# Patient Record
Sex: FEMALE | Race: WHITE | NOT HISPANIC OR LATINO | Employment: OTHER | URBAN - METROPOLITAN AREA
[De-identification: names, ages, dates, MRNs, and addresses within clinical notes are randomized per-mention and may not be internally consistent; named-entity substitution may affect disease eponyms.]

---

## 2017-02-08 ENCOUNTER — DOCUMENTATION (OUTPATIENT)
Dept: CARDIOLOGY | Facility: CLINIC | Age: 67
End: 2017-02-08

## 2017-02-08 NOTE — PROGRESS NOTES
Patient called and left a message stating she was returning a call about medication.  I attempted to call patient back to get more information and had to leave a message at 1:44pm

## 2017-02-09 DIAGNOSIS — E78.5 DYSLIPIDEMIA: Primary | ICD-10-CM

## 2017-02-09 RX ORDER — ROSUVASTATIN CALCIUM 40 MG/1
40 TABLET, COATED ORAL DAILY
Qty: 90 TABLET | Refills: 0 | Status: SHIPPED | OUTPATIENT
Start: 2017-02-09 | End: 2017-05-22 | Stop reason: SDUPTHER

## 2017-02-15 ENCOUNTER — OFFICE VISIT (OUTPATIENT)
Dept: CARDIOLOGY | Facility: CLINIC | Age: 67
End: 2017-02-15

## 2017-02-15 VITALS
SYSTOLIC BLOOD PRESSURE: 124 MMHG | WEIGHT: 154.2 LBS | HEIGHT: 68 IN | BODY MASS INDEX: 23.37 KG/M2 | HEART RATE: 64 BPM | DIASTOLIC BLOOD PRESSURE: 68 MMHG

## 2017-02-15 DIAGNOSIS — E78.5 DYSLIPIDEMIA: ICD-10-CM

## 2017-02-15 DIAGNOSIS — Z48.812 POSTOP CAROTID ENDARTERECTOMY SURVEILLANCE, ENCOUNTER FOR: ICD-10-CM

## 2017-02-15 DIAGNOSIS — I25.718 CORONARY ARTERY DISEASE INVOLVING AUTOLOGOUS VEIN CORONARY BYPASS GRAFT WITH OTHER FORMS OF ANGINA PECTORIS (HCC): Primary | ICD-10-CM

## 2017-02-15 DIAGNOSIS — I77.9 RIGHT-SIDED CAROTID ARTERY DISEASE (HCC): ICD-10-CM

## 2017-02-15 DIAGNOSIS — Z72.0 TOBACCO ABUSE: ICD-10-CM

## 2017-02-15 DIAGNOSIS — I70.1 RENAL ARTERY STENOSIS (HCC): ICD-10-CM

## 2017-02-15 DIAGNOSIS — I10 ESSENTIAL HYPERTENSION: ICD-10-CM

## 2017-02-15 PROCEDURE — 99213 OFFICE O/P EST LOW 20 MIN: CPT | Performed by: NURSE PRACTITIONER

## 2017-02-15 RX ORDER — LEVOTHYROXINE SODIUM 0.1 MG/1
100 TABLET ORAL DAILY
COMMUNITY

## 2017-02-15 RX ORDER — BISOPROLOL FUMARATE 5 MG/1
5 TABLET, FILM COATED ORAL DAILY
COMMUNITY

## 2017-02-15 RX ORDER — DIAZEPAM 5 MG/1
5 TABLET ORAL DAILY PRN
COMMUNITY

## 2017-02-15 RX ORDER — CITALOPRAM 40 MG/1
40 TABLET ORAL DAILY
COMMUNITY
End: 2017-08-28 | Stop reason: ALTCHOICE

## 2017-02-15 RX ORDER — ASPIRIN 81 MG/1
81 TABLET ORAL DAILY
COMMUNITY
End: 2017-12-05

## 2017-02-15 RX ORDER — NIFEDIPINE 30 MG/1
30 TABLET, FILM COATED, EXTENDED RELEASE ORAL DAILY
COMMUNITY

## 2017-02-15 RX ORDER — OMEPRAZOLE 40 MG/1
40 CAPSULE, DELAYED RELEASE ORAL DAILY
COMMUNITY

## 2017-02-15 RX ORDER — TRAZODONE HYDROCHLORIDE 150 MG/1
150 TABLET ORAL NIGHTLY
COMMUNITY

## 2017-02-15 NOTE — PROGRESS NOTES
Subjective:     Encounter Date:02/15/2017      Patient ID: Dalila Velez is a 66 y.o. female.    Chief Complaint:Coronary Artery Disease; Hypertension; and Follow-up    PROBLEM LIST:  1. Coronary artery disease:  a. History of catheter-based interventions; incomplete database.   b. Normal Regadenoson myocardial perfusion study, January 2015.  2. Renal artery stenosis:  a. Status post bilateral renal artery stenting by Dr. Landis in 2012. Right renal artery stented with a 4 mm stent. Left renal was treated with a 5.0 x 15 mm bare-metal stent.   3. Carotid disease:  a. Left carotid endarterectomy by Dr. Ramirez, 2006.   4. Hypertension.  5. Dyslipidemia.  6. Ongoing tobacco abuse.  7. Peptic ulcer disease.  8. GERD.  9. Hypothyroidism with nodule.  10. Cholecystectomy.   11. Anxiety/depression.      Allergies   Allergen Reactions   • Codeine Nausea And Vomiting         Current Outpatient Prescriptions:   •  aspirin 81 MG EC tablet, Take 81 mg by mouth Daily., Disp: , Rfl:   •  bisoprolol (ZEBeta) 5 MG tablet, Take 5 mg by mouth Daily. Takes 1/2 tablet at bed time, Disp: , Rfl:   •  citalopram (CeleXA) 40 MG tablet, Take 40 mg by mouth Daily., Disp: , Rfl:   •  diazePAM (VALIUM) 5 MG tablet, Take 5 mg by mouth Daily As Needed for anxiety., Disp: , Rfl:   •  levothyroxine (SYNTHROID, LEVOTHROID) 100 MCG tablet, Take 100 mcg by mouth Daily., Disp: , Rfl:   •  NIFEdipine CC (ADALAT CC) 30 MG 24 hr tablet, Take 30 mg by mouth Daily., Disp: , Rfl:   •  omeprazole (priLOSEC) 40 MG capsule, Take 40 mg by mouth Daily., Disp: , Rfl:   •  traZODone (DESYREL) 150 MG tablet, Take 150 mg by mouth Every Night., Disp: , Rfl:   •  rosuvastatin (CRESTOR) 40 MG tablet, Take 1 tablet by mouth Daily., Disp: 90 tablet, Rfl: 0        History of Present Illness    Patient returns today for annual follow-up of her coronary and peripheral arterial disease.  Since last being seen she is overall done fairly well.  She has been under a  "significant amount of stress over the last year due to acute illness of her .  He is today going to be transferred to a nursing home here in [place].  He has a tracheostomy and PEG tube.  Patient does note over the last few months some mild increasing shortness of breath with activity.  Also notes that she feels \"exhausted\" and fatigues a little bit easier with physical activity.  These are not similar to her previous anginal symptoms which include dizziness, presyncope, and \"getting sick\".  She denies any chest pain, pressure, tightness.  Denies any syncope, near-syncope, edema.  No claudication symptoms noted.  Does note some occasional palpitations.  These are noted primarily at night whenever she lays down to go to bed.  They're very brief in nature.    The following portions of the patient's history were reviewed and updated as appropriate: allergies, current medications, past family history, past medical history, past social history, past surgical history and problem list.        Social History   Substance Use Topics   • Smoking status: Current Every Day Smoker     Types: Cigarettes   • Smokeless tobacco: None   • Alcohol use No         Review of Systems   Constitution: Positive for malaise/fatigue. Negative for fever and weakness.   HENT: Negative for headaches and nosebleeds.    Eyes: Negative for redness and visual disturbance.   Cardiovascular: Negative for orthopnea, palpitations and paroxysmal nocturnal dyspnea.   Respiratory: Positive for shortness of breath. Negative for cough, snoring, sputum production and wheezing.    Hematologic/Lymphatic: Negative for bleeding problem.   Skin: Negative for flushing, itching and rash.   Musculoskeletal: Negative for falls, joint pain and muscle cramps.   Gastrointestinal: Negative for abdominal pain, diarrhea, heartburn, nausea and vomiting.   Genitourinary: Negative for hematuria.   Neurological: Negative for excessive daytime sleepiness, dizziness and " "tremors.   Psychiatric/Behavioral: Negative for substance abuse. The patient is not nervous/anxious.           Objective:    height is 68\" (172.7 cm) and weight is 154 lb 3.2 oz (69.9 kg). Her blood pressure is 124/68 and her pulse is 64.         Physical Exam   Constitutional: She is oriented to person, place, and time. She appears well-developed and well-nourished.   Neck: No JVD present. Carotid bruit is not present.   Cardiovascular: Normal rate, regular rhythm, S1 normal, S2 normal and normal heart sounds.    Pulmonary/Chest: Effort normal and breath sounds normal. No respiratory distress.   Abdominal: Soft. Bowel sounds are normal. There is no tenderness.   Musculoskeletal: She exhibits no edema.   Neurological: She is alert and oriented to person, place, and time.   Skin: Skin is warm and dry.       Procedures          Assessment:   Assessment/Plan      Dalila was seen today for coronary artery disease, hypertension and follow-up.    Diagnoses and all orders for this visit:    Coronary artery disease involving autologous vein coronary bypass graft with other forms of angina pectoris    Right-sided carotid artery disease    Renal artery stenosis    Essential hypertension    Dyslipidemia    Tobacco abuse      Plan:    At this time secondary to the patient's noted symptoms and no ischemic evaluation in almost 2 years we'll proceed with myocardial perfusion study.  Also, it is been roughly 3 years since she has had her carotid arteries assessed and has previous history of carotid endarterectomy.  We'll schedule a carotid duplex at her convenience.  Discussed smoking cessation with the patient.  Will obtain the aforementioned testing and see her back in one year's time or sooner if abnormal test results.    MORIAH Santos         Please note that portions of this note may have been completed with a voice recognition program. Efforts were made to edit the dictations, but occasionally words are " mistranscribed.

## 2017-03-15 ENCOUNTER — APPOINTMENT (OUTPATIENT)
Dept: CARDIOLOGY | Facility: HOSPITAL | Age: 67
End: 2017-03-15

## 2017-03-22 ENCOUNTER — OUTSIDE FACILITY SERVICE (OUTPATIENT)
Dept: CARDIOLOGY | Facility: CLINIC | Age: 67
End: 2017-03-22

## 2017-03-22 PROCEDURE — 93018 CV STRESS TEST I&R ONLY: CPT | Performed by: INTERNAL MEDICINE

## 2017-03-22 PROCEDURE — 78452 HT MUSCLE IMAGE SPECT MULT: CPT | Performed by: INTERNAL MEDICINE

## 2017-04-05 ENCOUNTER — HOSPITAL ENCOUNTER (OUTPATIENT)
Dept: CARDIOLOGY | Facility: HOSPITAL | Age: 67
Discharge: HOME | End: 2017-04-05
Admitting: NURSE PRACTITIONER

## 2017-04-05 VITALS
DIASTOLIC BLOOD PRESSURE: 76 MMHG | WEIGHT: 154 LBS | SYSTOLIC BLOOD PRESSURE: 163 MMHG | BODY MASS INDEX: 23.34 KG/M2 | HEIGHT: 68 IN

## 2017-04-05 DIAGNOSIS — Z48.812 POSTOP CAROTID ENDARTERECTOMY SURVEILLANCE, ENCOUNTER FOR: ICD-10-CM

## 2017-04-05 DIAGNOSIS — I77.9 RIGHT-SIDED CAROTID ARTERY DISEASE (HCC): ICD-10-CM

## 2017-04-05 DIAGNOSIS — E78.5 DYSLIPIDEMIA: ICD-10-CM

## 2017-04-05 DIAGNOSIS — Z72.0 TOBACCO ABUSE: ICD-10-CM

## 2017-04-05 PROCEDURE — 93880 EXTRACRANIAL BILAT STUDY: CPT | Performed by: INTERNAL MEDICINE

## 2017-04-05 PROCEDURE — 93880 EXTRACRANIAL BILAT STUDY: CPT

## 2017-04-06 LAB
BH CV ECHO MEAS - BSA(HAYCOCK): 1.8 M^2
BH CV ECHO MEAS - BSA: 1.8 M^2
BH CV ECHO MEAS - BZI_BMI: 23.4 KILOGRAMS/M^2
BH CV ECHO MEAS - BZI_METRIC_HEIGHT: 172.7 CM
BH CV ECHO MEAS - BZI_METRIC_WEIGHT: 69.9 KG
BH CV XLRA MEAS LEFT CCA RATIO VEL: 86.6 CM/SEC
BH CV XLRA MEAS LEFT DIST CCA EDV: 23 CM/SEC
BH CV XLRA MEAS LEFT DIST CCA PSV: 86.6 CM/SEC
BH CV XLRA MEAS LEFT DIST ICA EDV: 41 CM/SEC
BH CV XLRA MEAS LEFT DIST ICA PSV: 152 CM/SEC
BH CV XLRA MEAS LEFT ICA RATIO VEL: 178 CM/SEC
BH CV XLRA MEAS LEFT ICA/CCA RATIO: 2.1
BH CV XLRA MEAS LEFT MID ICA EDV: 41.9 CM/SEC
BH CV XLRA MEAS LEFT MID ICA PSV: 144 CM/SEC
BH CV XLRA MEAS LEFT PROX CCA EDV: 16.8 CM/SEC
BH CV XLRA MEAS LEFT PROX CCA PSV: 78.2 CM/SEC
BH CV XLRA MEAS LEFT PROX ECA EDV: 33.8 CM/SEC
BH CV XLRA MEAS LEFT PROX ECA PSV: 389 CM/SEC
BH CV XLRA MEAS LEFT PROX ICA EDV: 35 CM/SEC
BH CV XLRA MEAS LEFT PROX ICA PSV: 178 CM/SEC
BH CV XLRA MEAS LEFT PROX SCLA PSV: 212 CM/SEC
BH CV XLRA MEAS LEFT VERTEBRAL A EDV: 20.6 CM/SEC
BH CV XLRA MEAS LEFT VERTEBRAL A PSV: 72.7 CM/SEC
BH CV XLRA MEAS RIGHT CCA RATIO VEL: 80.5 CM/SEC
BH CV XLRA MEAS RIGHT DIST CCA EDV: 14.9 CM/SEC
BH CV XLRA MEAS RIGHT DIST CCA PSV: 80.5 CM/SEC
BH CV XLRA MEAS RIGHT DIST ICA EDV: 39.3 CM/SEC
BH CV XLRA MEAS RIGHT DIST ICA PSV: 183 CM/SEC
BH CV XLRA MEAS RIGHT ICA RATIO VEL: 205 CM/SEC
BH CV XLRA MEAS RIGHT ICA/CCA RATIO: 2.5
BH CV XLRA MEAS RIGHT MID ICA EDV: 48.3 CM/SEC
BH CV XLRA MEAS RIGHT MID ICA PSV: 205 CM/SEC
BH CV XLRA MEAS RIGHT PROX CCA EDV: 14.9 CM/SEC
BH CV XLRA MEAS RIGHT PROX CCA PSV: 93.7 CM/SEC
BH CV XLRA MEAS RIGHT PROX ECA PSV: 253 CM/SEC
BH CV XLRA MEAS RIGHT PROX ICA EDV: 37 CM/SEC
BH CV XLRA MEAS RIGHT PROX ICA PSV: 156 CM/SEC
BH CV XLRA MEAS RIGHT PROX SCLA PSV: 138 CM/SEC
BH CV XLRA MEAS RIGHT VERTEBRAL A EDV: 11.6 CM/SEC
BH CV XLRA MEAS RIGHT VERTEBRAL A PSV: 64.5 CM/SEC
LEFT ARM BP: NORMAL MMHG
RIGHT ARM BP: NORMAL MMHG

## 2017-05-23 RX ORDER — ROSUVASTATIN CALCIUM 40 MG/1
TABLET, COATED ORAL
Qty: 90 TABLET | Refills: 3 | Status: SHIPPED | OUTPATIENT
Start: 2017-05-23 | End: 2017-05-25 | Stop reason: SDUPTHER

## 2017-05-25 RX ORDER — ROSUVASTATIN CALCIUM 40 MG/1
40 TABLET, COATED ORAL NIGHTLY
Qty: 90 TABLET | Refills: 3 | Status: SHIPPED | OUTPATIENT
Start: 2017-05-25

## 2017-06-01 ENCOUNTER — TELEPHONE (OUTPATIENT)
Dept: CARDIOLOGY | Facility: CLINIC | Age: 67
End: 2017-06-01

## 2017-06-01 NOTE — TELEPHONE ENCOUNTER
Patient is scheduled to have a left total hip replacement on 6/7/2017 with Dr. Doyle at [facility]. Patient did have a normal stress test in April but she does have moderate bilateral carotid disease. She is on aspirin and will be instructed to continue this. Can she be cleared for this procedure?      Fax to [facility] is [phone]  Phone is [phone]

## 2017-08-16 ENCOUNTER — APPOINTMENT (OUTPATIENT)
Dept: GENERAL RADIOLOGY | Facility: HOSPITAL | Age: 67
End: 2017-08-16

## 2017-08-16 ENCOUNTER — HOSPITAL ENCOUNTER (EMERGENCY)
Facility: HOSPITAL | Age: 67
Discharge: HOME | End: 2017-08-16
Attending: EMERGENCY MEDICINE | Admitting: EMERGENCY MEDICINE

## 2017-08-16 VITALS
HEART RATE: 56 BPM | BODY MASS INDEX: 23.54 KG/M2 | OXYGEN SATURATION: 96 % | DIASTOLIC BLOOD PRESSURE: 75 MMHG | HEIGHT: 67 IN | TEMPERATURE: 97.7 F | WEIGHT: 150 LBS | SYSTOLIC BLOOD PRESSURE: 148 MMHG | RESPIRATION RATE: 18 BRPM

## 2017-08-16 DIAGNOSIS — D72.829 LEUKOCYTOSIS, UNSPECIFIED TYPE: ICD-10-CM

## 2017-08-16 DIAGNOSIS — R07.89 ATYPICAL CHEST PAIN: Primary | ICD-10-CM

## 2017-08-16 DIAGNOSIS — R79.89 ELEVATED SERUM CREATININE: ICD-10-CM

## 2017-08-16 LAB
ALBUMIN SERPL-MCNC: 4.1 G/DL (ref 3.2–4.8)
ALBUMIN/GLOB SERPL: 1.6 G/DL (ref 1.5–2.5)
ALP SERPL-CCNC: 102 U/L (ref 25–100)
ALT SERPL W P-5'-P-CCNC: 13 U/L (ref 7–40)
ANION GAP SERPL CALCULATED.3IONS-SCNC: -1 MMOL/L (ref 3–11)
AST SERPL-CCNC: 28 U/L (ref 0–33)
BASOPHILS # BLD AUTO: 0.02 10*3/MM3 (ref 0–0.2)
BASOPHILS NFR BLD AUTO: 0.2 % (ref 0–1)
BILIRUB SERPL-MCNC: 0.4 MG/DL (ref 0.3–1.2)
BNP SERPL-MCNC: 109 PG/ML (ref 0–100)
BUN BLD-MCNC: 18 MG/DL (ref 9–23)
BUN/CREAT SERPL: 12 (ref 7–25)
CALCIUM SPEC-SCNC: 9.1 MG/DL (ref 8.7–10.4)
CHLORIDE SERPL-SCNC: 111 MMOL/L (ref 99–109)
CO2 SERPL-SCNC: 27 MMOL/L (ref 20–31)
CREAT BLD-MCNC: 1.5 MG/DL (ref 0.6–1.3)
DEPRECATED RDW RBC AUTO: 46.3 FL (ref 37–54)
EOSINOPHIL # BLD AUTO: 0.12 10*3/MM3 (ref 0–0.3)
EOSINOPHIL NFR BLD AUTO: 1.1 % (ref 0–3)
ERYTHROCYTE [DISTWIDTH] IN BLOOD BY AUTOMATED COUNT: 14.7 % (ref 11.3–14.5)
GFR SERPL CREATININE-BSD FRML MDRD: 35 ML/MIN/1.73
GLOBULIN UR ELPH-MCNC: 2.6 GM/DL
GLUCOSE BLD-MCNC: 135 MG/DL (ref 70–100)
HCT VFR BLD AUTO: 36 % (ref 34.5–44)
HGB BLD-MCNC: 11.7 G/DL (ref 11.5–15.5)
HOLD SPECIMEN: NORMAL
HOLD SPECIMEN: NORMAL
IMM GRANULOCYTES # BLD: 0.02 10*3/MM3 (ref 0–0.03)
IMM GRANULOCYTES NFR BLD: 0.2 % (ref 0–0.6)
LIPASE SERPL-CCNC: 44 U/L (ref 6–51)
LYMPHOCYTES # BLD AUTO: 1.56 10*3/MM3 (ref 0.6–4.8)
LYMPHOCYTES NFR BLD AUTO: 14 % (ref 24–44)
MCH RBC QN AUTO: 28.1 PG (ref 27–31)
MCHC RBC AUTO-ENTMCNC: 32.5 G/DL (ref 32–36)
MCV RBC AUTO: 86.5 FL (ref 80–99)
MONOCYTES # BLD AUTO: 0.6 10*3/MM3 (ref 0–1)
MONOCYTES NFR BLD AUTO: 5.4 % (ref 0–12)
NEUTROPHILS # BLD AUTO: 8.83 10*3/MM3 (ref 1.5–8.3)
NEUTROPHILS NFR BLD AUTO: 79.1 % (ref 41–71)
NRBC BLD MANUAL-RTO: 0 /100 WBC (ref 0–0)
PLATELET # BLD AUTO: 180 10*3/MM3 (ref 150–450)
PMV BLD AUTO: 12.1 FL (ref 6–12)
POTASSIUM BLD-SCNC: 4.7 MMOL/L (ref 3.5–5.5)
PROT SERPL-MCNC: 6.7 G/DL (ref 5.7–8.2)
RBC # BLD AUTO: 4.16 10*6/MM3 (ref 3.89–5.14)
SODIUM BLD-SCNC: 137 MMOL/L (ref 132–146)
TROPONIN I SERPL-MCNC: 0 NG/ML (ref 0–0.07)
TROPONIN I SERPL-MCNC: 0.01 NG/ML (ref 0–0.07)
WBC NRBC COR # BLD: 11.15 10*3/MM3 (ref 3.5–10.8)
WHOLE BLOOD HOLD SPECIMEN: NORMAL
WHOLE BLOOD HOLD SPECIMEN: NORMAL

## 2017-08-16 PROCEDURE — 83690 ASSAY OF LIPASE: CPT | Performed by: EMERGENCY MEDICINE

## 2017-08-16 PROCEDURE — 84484 ASSAY OF TROPONIN QUANT: CPT

## 2017-08-16 PROCEDURE — 80053 COMPREHEN METABOLIC PANEL: CPT | Performed by: EMERGENCY MEDICINE

## 2017-08-16 PROCEDURE — 99284 EMERGENCY DEPT VISIT MOD MDM: CPT

## 2017-08-16 PROCEDURE — 93005 ELECTROCARDIOGRAM TRACING: CPT | Performed by: EMERGENCY MEDICINE

## 2017-08-16 PROCEDURE — 85025 COMPLETE CBC W/AUTO DIFF WBC: CPT

## 2017-08-16 PROCEDURE — 83880 ASSAY OF NATRIURETIC PEPTIDE: CPT | Performed by: EMERGENCY MEDICINE

## 2017-08-16 PROCEDURE — 71010 HC CHEST PA OR AP: CPT

## 2017-08-16 RX ORDER — SODIUM CHLORIDE 0.9 % (FLUSH) 0.9 %
10 SYRINGE (ML) INJECTION AS NEEDED
Status: DISCONTINUED | OUTPATIENT
Start: 2017-08-16 | End: 2017-08-16 | Stop reason: HOSPADM

## 2017-08-16 NOTE — ED PROVIDER NOTES
Subjective   HPI Comments: This patient is a very nice 67 rolled female with a history of coronary artery disease who is at the nephrology clinic today when she began having chest pain.  Patient states that the pain actually started approximately 2:30 to 2:45 PM.  She thought she was having a panic attack, but the pain became very different than her typical panic attacks.  She describes substernal chest pressure with radiation to the back.  She had positive nausea positive shortness of breath positive diaphoresis and was very concerned.  Her blood pressure dropped into the 70s systolically.  EMS arrived and gave her aspirin.  No nitroglycerin was given.  Patient states that her normal blood pressure of roughly 100/70.  She's been feeling well recently without any fevers chills hemoptysis, leg swelling, cough, congestion, abdominal pain or any other concerns.  She is accompanied here by her daughter who confirms the aforementioned story.  Patient feels very well here at this time without any current chest pain.  Her last Cardiologic testing was a stress test by Dr. Lico Nixon M.D. approximately February 2017.  She reports that she does have stents, and reports that these were placed approximately 2013.  She is followed by Dr. Lico Nixon M.D. as well as Dr. Frannie Couch M.D.    Past Medical History: CAD, HTN, HLD, Anxiety, Negative Stress Test in February 2015, smoker. No DM, CVA    Past Family History: Cardiac disease    Patient is a 67 y.o. female presenting with chest pain.   History provided by:  Patient  Chest Pain   Pain location:  Unable to specify  Pain quality: pressure    Pain radiates to:  Mid back  Pain severity:  Moderate  Onset quality:  Sudden  Duration: Onset 0245.  Timing:  Constant  Progression:  Improving  Chronicity:  New  Relieved by:  None tried  Worsened by:  Nothing  Ineffective treatments:  None tried  Associated symptoms: back pain, diaphoresis, dizziness, nausea, numbness (Hands)  and shortness of breath    Associated symptoms: no fatigue, no fever, no headache, no palpitations and no vomiting    Risk factors: coronary artery disease, high cholesterol and hypertension        Review of Systems   Constitutional: Positive for diaphoresis. Negative for chills, fatigue, fever and unexpected weight change.   HENT: Negative for dental problem, ear pain, hearing loss and sinus pressure.    Eyes: Negative for pain and visual disturbance.   Respiratory: Positive for shortness of breath. Negative for chest tightness.    Cardiovascular: Positive for chest pain. Negative for palpitations and leg swelling.   Gastrointestinal: Positive for nausea. Negative for diarrhea and vomiting.   Genitourinary: Negative for difficulty urinating, dyspareunia, hematuria and urgency.   Musculoskeletal: Positive for back pain. Negative for myalgias, neck pain and neck stiffness.   Neurological: Positive for dizziness and numbness (Hands). Negative for seizures, syncope, speech difficulty, light-headedness and headaches.   Psychiatric/Behavioral: Negative for confusion. The patient is nervous/anxious.    All other systems reviewed and are negative.      Past Medical History:   Diagnosis Date   • Anxiety    • Carotid artery disease    • Coronary artery disease    • Depression    • Dyslipidemia    • GERD (gastroesophageal reflux disease)    • Hypertension    • Hypothyroidism     Hypothyroidism with nodule.   • Peptic ulcer disease    • Renal artery stenosis        Allergies   Allergen Reactions   • Codeine Nausea And Vomiting       Past Surgical History:   Procedure Laterality Date   • CAROTID ENDARTERECTOMY Left 2006    Left carotid endarterectomy by Dr. Ramirez, 2006.    • CHOLECYSTECTOMY     • CORONARY ANGIOPLASTY      History of catheter-based interventions; incomplete database.    • RENAL ARTERY STENT Bilateral 2012    Status post bilateral renal artery stenting by Dr. Landis in 2012.  Right renal artery stented with a 4  mm stent.  Left renal was treated with a 5.0 x 15 mm bare-metal stent.         History reviewed. No pertinent family history.    Social History     Social History   • Marital status:      Spouse name: N/A   • Number of children: N/A   • Years of education: N/A     Social History Main Topics   • Smoking status: Current Every Day Smoker     Types: Cigarettes   • Smokeless tobacco: None   • Alcohol use No   • Drug use: No   • Sexual activity: Defer     Other Topics Concern   • None     Social History Narrative         Objective   Physical Exam   Constitutional: She is oriented to person, place, and time. She appears well-developed. No distress.   HENT:   Head: Normocephalic and atraumatic.   Right Ear: External ear normal.   Left Ear: External ear normal.   Nose: Nose normal.   Mouth/Throat: Oropharynx is clear and moist.   Eyes: EOM are normal. Pupils are equal, round, and reactive to light.   Neck: Normal range of motion. Neck supple. No JVD present.   Cardiovascular: Normal rate, regular rhythm and normal heart sounds.  Exam reveals no gallop and no friction rub.    Pulmonary/Chest: Effort normal and breath sounds normal. She has no wheezes. She has no rales. She exhibits no tenderness.   Abdominal: Soft. Bowel sounds are normal. She exhibits no distension and no mass. There is no tenderness. There is no rebound and no guarding.   No signs of acute abdomen.  No pain at McBurney's point.  No pulsatile abdominal mass   Musculoskeletal: Normal range of motion. She exhibits no edema.   Lymphadenopathy:     She has no cervical adenopathy.   Neurological: She is alert and oriented to person, place, and time.   5/5 strength bilaterally with flexion and extension of fingers, wrist, elbows, knees and hips as well as plantar and dorsiflexion of the foot.   Skin: Skin is warm and dry. No erythema. No pallor.   Psychiatric: She has a normal mood and affect. Her behavior is normal. Judgment and thought content normal.    Nursing note and vitals reviewed.      Procedures         ED Course  ED Course   Comment By Time   BP at bedside is 116/10, RR 16, and HR 58. Yvette Mejia 08/16 1738   I had a good conversation with the patient and her daughter.  The patient's initial troponin is normal.  EKG shows a sinus bradycardia otherwise no abnormalities.  The story is concerning for anginal equivalents.  The patient had substernal pressure, nausea, and impending sense of doom coupled with objective hypotension.  At this point, her blood pressure stable and her heart rate is in the 50s.  She feels well.  Blood blood suck out mildly elevated 11.15 with stable hemoglobin and hematocrit.  Creatinine mildly elevated at 1.50.  .  Lipase unremarkable.  We'll get a second set of enzymes and a plan to discuss personally with cardiology, Dr. Dimitri Jesus prior to ultimate disposition. Elliot Guillen MD 08/16 1745   Dr. Guillen is at the bedside re-evaluating the patient. Yvette Mejia 08/16 2002   I went back and reexamined the patient at 8 PM.  She is resting comfortably and has no chest pain whatsoever.  We talked about her lab results here including white blood cell count of 11.15, creatinine 1.5 and negative troponin ×2.  The patient's second EKG has been reviewed independently by me and shows a normal sinus rhythm with a rate of 64 and sinus arrhythmia.  No signs of acute ST segment elevation MI.  I like the patient to follow with Dr. Lico Nixon M.D. on Friday or Monday.  We will also refer her specifically to the heart and valve clinic, chest pain clinic.  She should continue aspirin as before.  Return here immediately for increasing chest pain or new concerns.  Impression will include elevated creatinine, leukocytosis unspecified, atypical chest pain. Elliot Guillen MD 08/16 2006     Recent Results (from the past 24 hour(s))   [facility]    Collection Time: 08/16/17  4:27 PM   Result Value Ref Range    Extra Tube Hold for  add-ons.    Comprehensive Metabolic Panel    Collection Time: 08/16/17  4:28 PM   Result Value Ref Range    Glucose 135 (H) 70 - 100 mg/dL    BUN 18 9 - 23 mg/dL    Creatinine 1.50 (H) 0.60 - 1.30 mg/dL    Sodium 137 132 - 146 mmol/L    Potassium 4.7 3.5 - 5.5 mmol/L    Chloride 111 (H) 99 - 109 mmol/L    CO2 27.0 20.0 - 31.0 mmol/L    Calcium 9.1 8.7 - 10.4 mg/dL    Total Protein 6.7 5.7 - 8.2 g/dL    Albumin 4.10 3.20 - 4.80 g/dL    ALT (SGPT) 13 7 - 40 U/L    AST (SGOT) 28 0 - 33 U/L    Alkaline Phosphatase 102 (H) 25 - 100 U/L    Total Bilirubin 0.4 0.3 - 1.2 mg/dL    eGFR Non African Amer 35 (L) >60 mL/min/1.73    Globulin 2.6 gm/dL    A/G Ratio 1.6 1.5 - 2.5 g/dL    BUN/Creatinine Ratio 12.0 7.0 - 25.0    Anion Gap -1.0 (L) 3.0 - 11.0 mmol/L   BNP    Collection Time: 08/16/17  4:28 PM   Result Value Ref Range    .0 (H) 0.0 - 100.0 pg/mL   Lipase    Collection Time: 08/16/17  4:28 PM   Result Value Ref Range    Lipase 44 6 - 51 U/L   Light Blue Top    Collection Time: 08/16/17  4:28 PM   Result Value Ref Range    Extra Tube hold for add-on    Green Top (Gel)    Collection Time: 08/16/17  4:28 PM   Result Value Ref Range    Extra Tube Hold for add-ons.    Lavender Top    Collection Time: 08/16/17  4:28 PM   Result Value Ref Range    Extra Tube hold for add-on    CBC Auto Differential    Collection Time: 08/16/17  4:28 PM   Result Value Ref Range    WBC 11.15 (H) 3.50 - 10.80 10*3/mm3    RBC 4.16 3.89 - 5.14 10*6/mm3    Hemoglobin 11.7 11.5 - 15.5 g/dL    Hematocrit 36.0 34.5 - 44.0 %    MCV 86.5 80.0 - 99.0 fL    MCH 28.1 27.0 - 31.0 pg    MCHC 32.5 32.0 - 36.0 g/dL    RDW 14.7 (H) 11.3 - 14.5 %    RDW-SD 46.3 37.0 - 54.0 fl    MPV 12.1 (H) 6.0 - 12.0 fL    Platelets 180 150 - 450 10*3/mm3    Neutrophil % 79.1 (H) 41.0 - 71.0 %    Lymphocyte % 14.0 (L) 24.0 - 44.0 %    Monocyte % 5.4 0.0 - 12.0 %    Eosinophil % 1.1 0.0 - 3.0 %    Basophil % 0.2 0.0 - 1.0 %    Immature Grans % 0.2 0.0 - 0.6 %     Neutrophils, Absolute 8.83 (H) 1.50 - 8.30 10*3/mm3    Lymphocytes, Absolute 1.56 0.60 - 4.80 10*3/mm3    Monocytes, Absolute 0.60 0.00 - 1.00 10*3/mm3    Eosinophils, Absolute 0.12 0.00 - 0.30 10*3/mm3    Basophils, Absolute 0.02 0.00 - 0.20 10*3/mm3    Immature Grans, Absolute 0.02 0.00 - 0.03 10*3/mm3    nRBC 0.0 0.0 - 0.0 /100 WBC   POC Troponin, Rapid    Collection Time: 08/16/17  4:44 PM   Result Value Ref Range    Troponin I 0.01 0.00 - 0.07 ng/mL   POC Troponin, Rapid    Collection Time: 08/16/17  7:24 PM   Result Value Ref Range    Troponin I 0.00 0.00 - 0.07 ng/mL     Note: In addition to lab results from this visit, the labs listed above may include labs taken at another facility or during a different encounter within the last 24 hours. Please correlate lab times with ED admission and discharge times for further clarification of the services performed during this visit.    XR Chest 1 View   Final Result   Mild cardiomegaly and stable left basilar scarring. No new   chest disease is seen.       DICTATED:     08/16/2017   EDITED:         08/16/2017       This report was finalized on 8/16/2017 10:05 PM by DR. Everett Che MD.            Vitals:    08/16/17 1900 08/16/17 1915 08/16/17 1930 08/16/17 2000   BP: 150/71 136/70 146/65 148/75   BP Location:       Patient Position:       Pulse:   62 56   Resp:       Temp:       TempSrc:       SpO2: 93% 94% 94% 96%   Weight:       Height:         Medications - No data to display  ECG/EMG Results (last 24 hours)     Procedure Component Value Units Date/Time    ECG 12 Lead [[id number]] Collected:  08/16/17 1612     Updated:  08/16/17 1613                HEART Score  History: Moderately suspicious (+1)  ECG: Normal (+0)  Age: Greater than or equal to 65 (+2)  Risk Factors: 3 or more risk factors OR history of atherosclerotic disease (+2)  Troponin: Normal limit or lower (+0)  Total: 5             MDM    Final diagnoses:   Atypical chest pain   Leukocytosis, unspecified  type   Elevated serum creatinine   EMR Dragon/Transcription disclaimer:  Much of this encounter note is an electronic transcription/translation of spoken language to printed text. The electronic translation of spoken language may permit erroneous, or at times, nonsensical words or phrases to be inadvertently transcribed; Although I have reviewed the note for such errors, some may still exist.    Documentation assistance provided by kong IVERSON.  Information recorded by the kong was done at my direction and has been verified and validated by me.     Yvette Iverson  08/16/17 1739       Yvette Iverson  08/16/17 1743       Yvette Iverson  08/16/17 1926       Elliot Guillen MD  08/17/17 0113

## 2017-08-17 NOTE — DISCHARGE INSTRUCTIONS
Continue taking an aspirin one day.    Follow up with the [facility]. They will call you tomorrow. If you do not hear back by tomorrow at noon please call them.    Follow up with Dr. Nixon, cardiology, on Friday or Monday.    Immediately return to the emergency department for any new or worsening symptoms.

## 2017-08-18 ENCOUNTER — TELEPHONE (OUTPATIENT)
Dept: CARDIOLOGY | Facility: CLINIC | Age: 67
End: 2017-08-18

## 2017-08-18 NOTE — TELEPHONE ENCOUNTER
Spoke with patient about an ER follow up with Dr. Nixon. I offered patient an appointment with MORIAH Malik for Dr. Nixon on 8/21/17 but patient states she will keep her appt with the heart and valve center on 8/22/17 then wait to see what they say. Instructed patient to call back with any further concerns. She verbalized understanding.

## 2017-08-28 ENCOUNTER — OFFICE VISIT (OUTPATIENT)
Dept: CARDIOLOGY | Facility: HOSPITAL | Age: 67
End: 2017-08-28

## 2017-08-28 VITALS
TEMPERATURE: 98.5 F | HEIGHT: 67 IN | SYSTOLIC BLOOD PRESSURE: 134 MMHG | BODY MASS INDEX: 23.23 KG/M2 | WEIGHT: 148 LBS | RESPIRATION RATE: 18 BRPM | DIASTOLIC BLOOD PRESSURE: 70 MMHG | HEART RATE: 84 BPM | OXYGEN SATURATION: 96 %

## 2017-08-28 DIAGNOSIS — I10 ESSENTIAL HYPERTENSION: ICD-10-CM

## 2017-08-28 DIAGNOSIS — Z72.0 TOBACCO ABUSE: ICD-10-CM

## 2017-08-28 DIAGNOSIS — I25.10 CORONARY ARTERY DISEASE INVOLVING NATIVE CORONARY ARTERY OF NATIVE HEART, ANGINA PRESENCE UNSPECIFIED: Primary | ICD-10-CM

## 2017-08-28 DIAGNOSIS — I77.9 BILATERAL CAROTID ARTERY DISEASE (HCC): ICD-10-CM

## 2017-08-28 PROCEDURE — 99214 OFFICE O/P EST MOD 30 MIN: CPT | Performed by: NURSE PRACTITIONER

## 2017-08-28 PROCEDURE — 99406 BEHAV CHNG SMOKING 3-10 MIN: CPT | Performed by: NURSE PRACTITIONER

## 2017-08-28 RX ORDER — NITROGLYCERIN 0.4 MG/1
TABLET SUBLINGUAL
Qty: 100 TABLET | Refills: 3 | Status: SHIPPED | OUTPATIENT
Start: 2017-08-28

## 2017-08-28 RX ORDER — PAROXETINE HYDROCHLORIDE 20 MG/1
20 TABLET, FILM COATED ORAL DAILY
COMMUNITY
Start: 2017-08-22 | End: 2019-09-25

## 2017-08-28 NOTE — PROGRESS NOTES
[facility]  [facility] Val[facility]      Encounter Date:08/28/2017     Dalila Velez  [address] [place] [address]  [phone]    1950    Frannie Couch MD    Dalila Velez is a 67 y.o. female.      Subjective:     Chief Complaint:  Chest Pain (s/p ED visit)       HPI     This patient is a  67 rolled female with a history of coronary artery disease who presented to [facility] ED on  08/16/17. Lasted approximately 1 hours.   She thought she was having a panic attack, but the pain became very different than her typical panic attacks.  She describes substernal chest pressure with radiation to the back.  She had positive nausea, shortness of breath, diaphoresis.   Her blood pressure dropped into the 70s systolically.  EMS arrived and gave her aspirin.  No nitroglycerin was given.  Patient states that her normal blood pressure of roughly 100/70.  Her HR is usually 50-70's.  She's been feeling well recently without any fevers chills hemoptysis, leg swelling, cough, congestion, abdominal pain or any other concerns.  Reports being under stress with the death of her  in Feb. She was negative for acute MI during ED visit.  Pt has recent stress test 2/2017 with no ischemia and normal EF.  No recurrence of CP since.  Denies exertional CP or dyspnea, worsening fatigue.  Denies dizziness, palpitations, syncope, or edema.  She reports that she does have stents, and reports that these were placed approximately 2013.  She is followed by Dr. Lico Nixon M.D. as well as Dr. Frannie Couch M.D.     Past Medical History: CAD, HTN, HLD, Anxiety, Negative Stress Test in February 2015, smoker. No DM, CVA     Past Family History: Cardiac disease     Patient is a 67 y.o. female presenting with chest pain.   History provided by:  Patient  Chest Pain   Pain quality: pressure    Pain radiates to:  Mid back  Pain severity:  Moderate  Onset quality:  Sudden  Duration: 1hour.  Timing:  Constant  Progression:   resolved  Chronicity:  New  Relieved by:  None tried  Worsened by:  Nothing  Ineffective treatments:  None tried  Associated symptoms: back pain, diaphoresis, dizziness, nausea, numbness (Hands) and shortness of breath    Associated symptoms: no fatigue, no fever, no headache, no palpitations and no vomiting    Risk factors: coronary artery disease, high cholesterol and hypertension, tobacco use      Patient Active Problem List    Diagnosis   • Coronary artery disease [I25.10]     Overview Note:     1. Coronary artery disease:  a. History of catheter-based interventions; incomplete database.   b. Normal Regadenoson myocardial perfusion study, January 2015.       • Renal artery stenosis [I70.1]     Overview Note:     2. Renal artery stenosis:  a. Status post bilateral renal artery stenting by Dr. Landis in 2012.  Right renal artery stented with a 4 mm stent.  Left renal was treated with a 5.0 x 15 mm bare-metal stent.         • Carotid artery disease [I77.9]     Overview Note:     3. Carotid disease:  a. Left carotid endarterectomy by Dr. Ramirez, 2006.        • Hypertension [I10]   • Dyslipidemia [E78.5]   • Tobacco abuse [Z72.0]   • Peptic ulcer disease [K27.9]   • GERD (gastroesophageal reflux disease) [K21.9]   • Hypothyroidism [E03.9]     Overview Note:     Hypothyroidism with nodule.           Past Surgical History:   Procedure Laterality Date   • CAROTID ENDARTERECTOMY Left 2006    Left carotid endarterectomy by Dr. Ramirez, 2006.    • CHOLECYSTECTOMY     • CORONARY ANGIOPLASTY      History of catheter-based interventions; incomplete database.    • RENAL ARTERY STENT Bilateral 2012    Status post bilateral renal artery stenting by Dr. Landis in 2012.  Right renal artery stented with a 4 mm stent.  Left renal was treated with a 5.0 x 15 mm bare-metal stent.     • TOTAL HIP ARTHROPLASTY  06/2017       Allergies   Allergen Reactions   • Codeine Nausea And Vomiting         Current Outpatient Prescriptions:   •   aspirin 81 MG EC tablet, Take 81 mg by mouth Daily., Disp: , Rfl:   •  bisoprolol (ZEBeta) 5 MG tablet, Take 5 mg by mouth Daily. Takes 1/2 tablet at bed time, Disp: , Rfl:   •  diazePAM (VALIUM) 5 MG tablet, Take 5 mg by mouth Daily As Needed for anxiety., Disp: , Rfl:   •  levothyroxine (SYNTHROID, LEVOTHROID) 100 MCG tablet, Take 100 mcg by mouth Daily., Disp: , Rfl:   •  NIFEdipine CC (ADALAT CC) 30 MG 24 hr tablet, Take 30 mg by mouth Daily., Disp: , Rfl:   •  omeprazole (priLOSEC) 40 MG capsule, Take 40 mg by mouth Daily., Disp: , Rfl:   •  PARoxetine (PAXIL) 20 MG tablet, Take 20 mg by mouth Daily., Disp: , Rfl:   •  rosuvastatin (CRESTOR) 40 MG tablet, Take 1 tablet by mouth Every Night., Disp: 90 tablet, Rfl: 3  •  traZODone (DESYREL) 150 MG tablet, Take 150 mg by mouth Every Night., Disp: , Rfl:   •  nitroglycerin (NITROSTAT) 0.4 MG SL tablet, 1 under the tongue as needed for angina, may repeat q5mins for up three doses, Disp: 100 tablet, Rfl: 3    The following portions of the patient's history were reviewed and updated as appropriate: allergies, current medications, past family history, past medical history, past social history, past surgical history and problem list.    Review of Systems   Constitution: Positive for weakness and malaise/fatigue. Negative for chills, decreased appetite, diaphoresis, fever, night sweats, weight gain and weight loss.   HENT: Positive for congestion (post nasal drip). Negative for headaches and nosebleeds.    Eyes: Negative for blurred vision, visual disturbance and visual halos.   Cardiovascular: Positive for chest pain. Negative for claudication, cyanosis, dyspnea on exertion, irregular heartbeat, leg swelling, near-syncope, orthopnea, palpitations, paroxysmal nocturnal dyspnea and syncope.   Respiratory: Negative for cough, hemoptysis, shortness of breath, sleep disturbances due to breathing, snoring, sputum production and wheezing.    Endocrine: Positive for cold  "intolerance. Negative for heat intolerance, polydipsia, polyphagia and polyuria.   Hematologic/Lymphatic: Does not bruise/bleed easily.   Skin: Negative for dry skin, itching and rash.   Musculoskeletal: Positive for muscle weakness. Negative for falls, joint pain, joint swelling and myalgias.   Gastrointestinal: Positive for constipation and heartburn. Negative for bloating, abdominal pain, diarrhea, dysphagia, melena, nausea and vomiting.   Genitourinary: Negative for dysuria, flank pain, hematuria and nocturia.   Neurological: Positive for excessive daytime sleepiness. Negative for difficulty with concentration, dizziness and loss of balance.   Psychiatric/Behavioral: Positive for depression. Negative for altered mental status. The patient has insomnia and is nervous/anxious.    Allergic/Immunologic: Positive for environmental allergies (seasonal ).       Objective:     Vitals:    08/28/17 1017 08/28/17 1020 08/28/17 1021   BP: 146/65 170/79 134/70   BP Location: Right arm Left arm Left arm   Patient Position: Sitting Sitting Standing   Pulse: 64 67 84   Resp: 18     Temp: 98.5 °F (36.9 °C)     TempSrc: Temporal Artery      SpO2: 96%     Weight: 148 lb (67.1 kg)     Height: 67\" (170.2 cm)           Physical Exam   Constitutional: She is oriented to person, place, and time. She appears well-developed and well-nourished. No distress.   HENT:   Head: Normocephalic and atraumatic.   Mouth/Throat: Oropharynx is clear and moist.   Eyes: Conjunctivae are normal. Pupils are equal, round, and reactive to light. No scleral icterus.   Neck: No hepatojugular reflux and no JVD present. Carotid bruit is not present. No tracheal deviation present. No thyromegaly present.   Cardiovascular: Normal rate, regular rhythm, normal heart sounds and intact distal pulses.  Exam reveals no friction rub.    No murmur heard.  Pulmonary/Chest: Effort normal and breath sounds normal.   Abdominal: Soft. Bowel sounds are normal. She exhibits no " distension. There is no tenderness.   Musculoskeletal: She exhibits no edema.   Lymphadenopathy:     She has no cervical adenopathy.   Neurological: She is alert and oriented to person, place, and time.   Skin: Skin is warm, dry and intact. No rash noted. No cyanosis or erythema. No pallor.   Psychiatric: She has a normal mood and affect. Her behavior is normal. Thought content normal.   Vitals reviewed.      Lab and Diagnostic Review:  Admission on 08/16/2017, Discharged on 08/16/2017   Component Date Value Ref Range Status   • Glucose 08/16/2017 135* 70 - 100 mg/dL Final   • BUN 08/16/2017 18  9 - 23 mg/dL Final   • Creatinine 08/16/2017 1.50* 0.60 - 1.30 mg/dL Final   • Sodium 08/16/2017 137  132 - 146 mmol/L Final   • Potassium 08/16/2017 4.7  3.5 - 5.5 mmol/L Final   • Chloride 08/16/2017 111* 99 - 109 mmol/L Final   • CO2 08/16/2017 27.0  20.0 - 31.0 mmol/L Final   • Calcium 08/16/2017 9.1  8.7 - 10.4 mg/dL Final   • Total Protein 08/16/2017 6.7  5.7 - 8.2 g/dL Final   • Albumin 08/16/2017 4.10  3.20 - 4.80 g/dL Final   • ALT (SGPT) 08/16/2017 13  7 - 40 U/L Final   • AST (SGOT) 08/16/2017 28  0 - 33 U/L Final   • Alkaline Phosphatase 08/16/2017 102* 25 - 100 U/L Final   • Total Bilirubin 08/16/2017 0.4  0.3 - 1.2 mg/dL Final   • eGFR Non African Amer 08/16/2017 35* >60 mL/min/1.73 Final   • Globulin 08/16/2017 2.6  gm/dL Final   • A/G Ratio 08/16/2017 1.6  1.5 - 2.5 g/dL Final   • BUN/Creatinine Ratio 08/16/2017 12.0  7.0 - 25.0 Final   • Anion Gap 08/16/2017 -1.0* 3.0 - 11.0 mmol/L Final   • BNP 08/16/2017 109.0* 0.0 - 100.0 pg/mL Final   • Lipase 08/16/2017 44  6 - 51 U/L Final   • Extra Tube 08/16/2017 hold for add-on   Final    Auto resulted   • Extra Tube 08/16/2017 Hold for add-ons.   Final    Auto resulted.   • Extra Tube 08/16/2017 hold for add-on   Final    Auto resulted   • Extra Tube 08/16/2017 Hold for add-ons.   Final    Auto resulted.   • WBC 08/16/2017 11.15* 3.50 - 10.80 10*3/mm3 Final   • RBC  08/16/2017 4.16  3.89 - 5.14 10*6/mm3 Final   • Hemoglobin 08/16/2017 11.7  11.5 - 15.5 g/dL Final   • Hematocrit 08/16/2017 36.0  34.5 - 44.0 % Final   • MCV 08/16/2017 86.5  80.0 - 99.0 fL Final   • MCH 08/16/2017 28.1  27.0 - 31.0 pg Final   • MCHC 08/16/2017 32.5  32.0 - 36.0 g/dL Final   • RDW 08/16/2017 14.7* 11.3 - 14.5 % Final   • RDW-SD 08/16/2017 46.3  37.0 - 54.0 fl Final   • MPV 08/16/2017 12.1* 6.0 - 12.0 fL Final   • Platelets 08/16/2017 180  150 - 450 10*3/mm3 Final   • Neutrophil % 08/16/2017 79.1* 41.0 - 71.0 % Final   • Lymphocyte % 08/16/2017 14.0* 24.0 - 44.0 % Final   • Monocyte % 08/16/2017 5.4  0.0 - 12.0 % Final   • Eosinophil % 08/16/2017 1.1  0.0 - 3.0 % Final   • Basophil % 08/16/2017 0.2  0.0 - 1.0 % Final   • Immature Grans % 08/16/2017 0.2  0.0 - 0.6 % Final   • Neutrophils, Absolute 08/16/2017 8.83* 1.50 - 8.30 10*3/mm3 Final   • Lymphocytes, Absolute 08/16/2017 1.56  0.60 - 4.80 10*3/mm3 Final   • Monocytes, Absolute 08/16/2017 0.60  0.00 - 1.00 10*3/mm3 Final   • Eosinophils, Absolute 08/16/2017 0.12  0.00 - 0.30 10*3/mm3 Final   • Basophils, Absolute 08/16/2017 0.02  0.00 - 0.20 10*3/mm3 Final   • Immature Grans, Absolute 08/16/2017 0.02  0.00 - 0.03 10*3/mm3 Final   • nRBC 08/16/2017 0.0  0.0 - 0.0 /100 WBC Final   • Troponin I 08/16/2017 0.01  0.00 - 0.07 ng/mL Final    Serial Number: [id number]  [id number]   • Troponin I 08/16/2017 0.00  0.00 - 0.07 ng/mL Final    Serial Number: [id number]  [id number]     EKG 8/17/17: Normal sinus rhythm with sinus arrhythmia 64 bpm  Chest x-ray 8/17/17: Mild cardiomegaly, mild nonspecific left basilar interstitial changes, no acute findings.  Carotid duplex 4/5/17: 50-69% stenosis bilaterally   Nuclear stress 3/22/17: No ischemia, EF 68%  Assessment and Plan:         1. Coronary artery disease involving native coronary artery of native heart, angina presence unspecified  History of stents 2013.  Recent normal stress test 3/22/17.   Discussed repeating stress test at this time.  Patient deferred.  No further chest pain or noted equivalents.    - nitroglycerin (NITROSTAT) 0.4 MG SL tablet; 1 under the tongue as needed for angina, may repeat q5mins for up three doses  Dispense: 100 tablet; Refill: 3  Education on use of nitroglycerin.  Discussed when to go back to the emergency room for chest pain or pressure.  Discussed role the heart and valve Center when to call.  Follow-up with cardiology as discussed.  Call his symptoms worsen or change, any concerns.    2. Essential hypertension  Apically and low normal.  Monitor for signs and symptoms of bradycardia or hypotension discussed.    3. Bilateral carotid artery disease  Aspirin, statin    4. Tobacco abuse  Patient continues to smoke one pack per day.  discussed risk factors, importance of cessation, cessation aids.  Patient is not ready to quit smoking at this time. (3 minutes)    Offered f/u.  Pt declined.  Will f/u with Dr. Nixon as scheduled.    *Please note that portions of this note were completed with a voice recognition program. Efforts were made to edit the dictations, but occasionally words are mistranscribed.

## 2017-11-02 ENCOUNTER — APPOINTMENT (OUTPATIENT)
Dept: MRI IMAGING | Facility: HOSPITAL | Age: 67
End: 2017-11-02

## 2017-11-02 ENCOUNTER — HOSPITAL ENCOUNTER (EMERGENCY)
Facility: HOSPITAL | Age: 67
Discharge: HOME | End: 2017-11-02
Attending: EMERGENCY MEDICINE | Admitting: EMERGENCY MEDICINE

## 2017-11-02 ENCOUNTER — APPOINTMENT (OUTPATIENT)
Dept: GENERAL RADIOLOGY | Facility: HOSPITAL | Age: 67
End: 2017-11-02

## 2017-11-02 VITALS
HEIGHT: 68 IN | HEART RATE: 50 BPM | RESPIRATION RATE: 18 BRPM | WEIGHT: 147 LBS | BODY MASS INDEX: 22.28 KG/M2 | TEMPERATURE: 97.9 F | OXYGEN SATURATION: 96 % | SYSTOLIC BLOOD PRESSURE: 176 MMHG | DIASTOLIC BLOOD PRESSURE: 73 MMHG

## 2017-11-02 DIAGNOSIS — N28.9 RENAL INSUFFICIENCY: ICD-10-CM

## 2017-11-02 DIAGNOSIS — R42 DIZZINESS: Primary | ICD-10-CM

## 2017-11-02 DIAGNOSIS — Z72.0 TOBACCO ABUSE: ICD-10-CM

## 2017-11-02 DIAGNOSIS — I77.9 BILATERAL CAROTID ARTERY DISEASE (HCC): ICD-10-CM

## 2017-11-02 DIAGNOSIS — H83.09: ICD-10-CM

## 2017-11-02 DIAGNOSIS — I67.2 ARTERIOSCLEROTIC CEREBROVASCULAR DISEASE: ICD-10-CM

## 2017-11-02 DIAGNOSIS — Z71.6 TOBACCO ABUSE COUNSELING: ICD-10-CM

## 2017-11-02 LAB
ALBUMIN SERPL-MCNC: 5 G/DL (ref 3.2–4.8)
ALBUMIN/GLOB SERPL: 1.9 G/DL (ref 1.5–2.5)
ALP SERPL-CCNC: 112 U/L (ref 25–100)
ALT SERPL W P-5'-P-CCNC: 20 U/L (ref 7–40)
ANION GAP SERPL CALCULATED.3IONS-SCNC: 10 MMOL/L (ref 3–11)
AST SERPL-CCNC: 26 U/L (ref 0–33)
BACTERIA UR QL AUTO: ABNORMAL /HPF
BASOPHILS # BLD AUTO: 0.02 10*3/MM3 (ref 0–0.2)
BASOPHILS NFR BLD AUTO: 0.2 % (ref 0–1)
BILIRUB SERPL-MCNC: 0.6 MG/DL (ref 0.3–1.2)
BILIRUB UR QL STRIP: ABNORMAL
BNP SERPL-MCNC: 134 PG/ML (ref 0–100)
BUN BLD-MCNC: 23 MG/DL (ref 9–23)
BUN/CREAT SERPL: 16.4 (ref 7–25)
CALCIUM SPEC-SCNC: 9.7 MG/DL (ref 8.7–10.4)
CHLORIDE SERPL-SCNC: 107 MMOL/L (ref 99–109)
CLARITY UR: CLEAR
CO2 SERPL-SCNC: 22 MMOL/L (ref 20–31)
COLOR UR: ABNORMAL
CREAT BLD-MCNC: 1.4 MG/DL (ref 0.6–1.3)
DEPRECATED RDW RBC AUTO: 50.1 FL (ref 37–54)
EOSINOPHIL # BLD AUTO: 0.14 10*3/MM3 (ref 0–0.3)
EOSINOPHIL NFR BLD AUTO: 1.3 % (ref 0–3)
ERYTHROCYTE [DISTWIDTH] IN BLOOD BY AUTOMATED COUNT: 15.9 % (ref 11.3–14.5)
GFR SERPL CREATININE-BSD FRML MDRD: 38 ML/MIN/1.73
GLOBULIN UR ELPH-MCNC: 2.7 GM/DL
GLUCOSE BLD-MCNC: 99 MG/DL (ref 70–100)
GLUCOSE UR STRIP-MCNC: NEGATIVE MG/DL
HCT VFR BLD AUTO: 43.8 % (ref 34.5–44)
HGB BLD-MCNC: 14.3 G/DL (ref 11.5–15.5)
HGB UR QL STRIP.AUTO: NEGATIVE
HOLD SPECIMEN: NORMAL
HOLD SPECIMEN: NORMAL
HYALINE CASTS UR QL AUTO: ABNORMAL /LPF
IMM GRANULOCYTES # BLD: 0.02 10*3/MM3 (ref 0–0.03)
IMM GRANULOCYTES NFR BLD: 0.2 % (ref 0–0.6)
KETONES UR QL STRIP: NEGATIVE
LEUKOCYTE ESTERASE UR QL STRIP.AUTO: ABNORMAL
LYMPHOCYTES # BLD AUTO: 2.23 10*3/MM3 (ref 0.6–4.8)
LYMPHOCYTES NFR BLD AUTO: 20.4 % (ref 24–44)
MAGNESIUM SERPL-MCNC: 2.1 MG/DL (ref 1.3–2.7)
MCH RBC QN AUTO: 28.3 PG (ref 27–31)
MCHC RBC AUTO-ENTMCNC: 32.6 G/DL (ref 32–36)
MCV RBC AUTO: 86.6 FL (ref 80–99)
MONOCYTES # BLD AUTO: 0.64 10*3/MM3 (ref 0–1)
MONOCYTES NFR BLD AUTO: 5.9 % (ref 0–12)
NEUTROPHILS # BLD AUTO: 7.86 10*3/MM3 (ref 1.5–8.3)
NEUTROPHILS NFR BLD AUTO: 72 % (ref 41–71)
NITRITE UR QL STRIP: NEGATIVE
PH UR STRIP.AUTO: 6.5 [PH] (ref 5–8)
PLATELET # BLD AUTO: 204 10*3/MM3 (ref 150–450)
PMV BLD AUTO: 11.9 FL (ref 6–12)
POTASSIUM BLD-SCNC: 4.5 MMOL/L (ref 3.5–5.5)
PROT SERPL-MCNC: 7.7 G/DL (ref 5.7–8.2)
PROT UR QL STRIP: ABNORMAL
RBC # BLD AUTO: 5.06 10*6/MM3 (ref 3.89–5.14)
RBC # UR: ABNORMAL /HPF
REF LAB TEST METHOD: ABNORMAL
SODIUM BLD-SCNC: 139 MMOL/L (ref 132–146)
SP GR UR STRIP: 1.01 (ref 1–1.03)
SQUAMOUS #/AREA URNS HPF: ABNORMAL /HPF
T4 FREE SERPL-MCNC: 1.52 NG/DL (ref 0.89–1.76)
TROPONIN I SERPL-MCNC: 0 NG/ML (ref 0–0.07)
TROPONIN I SERPL-MCNC: 0 NG/ML (ref 0–0.07)
TSH SERPL DL<=0.05 MIU/L-ACNC: 0.66 MIU/ML (ref 0.35–5.35)
UROBILINOGEN UR QL STRIP: ABNORMAL
WBC NRBC COR # BLD: 10.91 10*3/MM3 (ref 3.5–10.8)
WBC UR QL AUTO: ABNORMAL /HPF
WHOLE BLOOD HOLD SPECIMEN: NORMAL
WHOLE BLOOD HOLD SPECIMEN: NORMAL

## 2017-11-02 PROCEDURE — 84439 ASSAY OF FREE THYROXINE: CPT | Performed by: EMERGENCY MEDICINE

## 2017-11-02 PROCEDURE — 81001 URINALYSIS AUTO W/SCOPE: CPT | Performed by: EMERGENCY MEDICINE

## 2017-11-02 PROCEDURE — 96360 HYDRATION IV INFUSION INIT: CPT

## 2017-11-02 PROCEDURE — 84443 ASSAY THYROID STIM HORMONE: CPT | Performed by: EMERGENCY MEDICINE

## 2017-11-02 PROCEDURE — 70547 MR ANGIOGRAPHY NECK W/O DYE: CPT

## 2017-11-02 PROCEDURE — 93005 ELECTROCARDIOGRAM TRACING: CPT | Performed by: EMERGENCY MEDICINE

## 2017-11-02 PROCEDURE — 71010 HC CHEST PA OR AP: CPT

## 2017-11-02 PROCEDURE — 70553 MRI BRAIN STEM W/O & W/DYE: CPT

## 2017-11-02 PROCEDURE — 80053 COMPREHEN METABOLIC PANEL: CPT | Performed by: EMERGENCY MEDICINE

## 2017-11-02 PROCEDURE — 0 GADOBENATE DIMEGLUMINE 529 MG/ML SOLUTION: Performed by: EMERGENCY MEDICINE

## 2017-11-02 PROCEDURE — 83880 ASSAY OF NATRIURETIC PEPTIDE: CPT | Performed by: EMERGENCY MEDICINE

## 2017-11-02 PROCEDURE — 85025 COMPLETE CBC W/AUTO DIFF WBC: CPT | Performed by: EMERGENCY MEDICINE

## 2017-11-02 PROCEDURE — 83735 ASSAY OF MAGNESIUM: CPT | Performed by: EMERGENCY MEDICINE

## 2017-11-02 PROCEDURE — 96361 HYDRATE IV INFUSION ADD-ON: CPT

## 2017-11-02 PROCEDURE — 70544 MR ANGIOGRAPHY HEAD W/O DYE: CPT

## 2017-11-02 PROCEDURE — A9577 INJ MULTIHANCE: HCPCS | Performed by: EMERGENCY MEDICINE

## 2017-11-02 PROCEDURE — 99285 EMERGENCY DEPT VISIT HI MDM: CPT

## 2017-11-02 PROCEDURE — 84484 ASSAY OF TROPONIN QUANT: CPT

## 2017-11-02 RX ORDER — SODIUM CHLORIDE 9 MG/ML
125 INJECTION, SOLUTION INTRAVENOUS CONTINUOUS
Status: DISCONTINUED | OUTPATIENT
Start: 2017-11-02 | End: 2017-11-02 | Stop reason: HOSPADM

## 2017-11-02 RX ORDER — NICOTINE 21 MG/24HR
1 PATCH, TRANSDERMAL 24 HOURS TRANSDERMAL EVERY 24 HOURS
Status: DISCONTINUED | OUTPATIENT
Start: 2017-11-02 | End: 2017-11-02 | Stop reason: HOSPADM

## 2017-11-02 RX ORDER — ASPIRIN 81 MG/1
324 TABLET, CHEWABLE ORAL ONCE
Status: COMPLETED | OUTPATIENT
Start: 2017-11-02 | End: 2017-11-02

## 2017-11-02 RX ORDER — NICOTINE 21 MG/24HR
1 PATCH, TRANSDERMAL 24 HOURS TRANSDERMAL EVERY 24 HOURS
Qty: 21 PATCH | Refills: 0 | Status: SHIPPED | OUTPATIENT
Start: 2017-11-02 | End: 2017-12-05

## 2017-11-02 RX ORDER — METHYLPREDNISOLONE 4 MG/1
TABLET ORAL
Qty: 21 TABLET | Refills: 0 | Status: SHIPPED | OUTPATIENT
Start: 2017-11-02 | End: 2017-12-05

## 2017-11-02 RX ORDER — ASPIRIN 325 MG
325 TABLET, DELAYED RELEASE (ENTERIC COATED) ORAL DAILY
Qty: 30 TABLET | Refills: 0 | Status: SHIPPED | OUTPATIENT
Start: 2017-11-02

## 2017-11-02 RX ORDER — MECLIZINE HYDROCHLORIDE 25 MG/1
25 TABLET ORAL ONCE
Status: COMPLETED | OUTPATIENT
Start: 2017-11-02 | End: 2017-11-02

## 2017-11-02 RX ORDER — SODIUM CHLORIDE 0.9 % (FLUSH) 0.9 %
10 SYRINGE (ML) INJECTION AS NEEDED
Status: DISCONTINUED | OUTPATIENT
Start: 2017-11-02 | End: 2017-11-02 | Stop reason: HOSPADM

## 2017-11-02 RX ORDER — MECLIZINE HYDROCHLORIDE 25 MG/1
25 TABLET ORAL 3 TIMES DAILY PRN
Qty: 20 TABLET | Refills: 0 | Status: SHIPPED | OUTPATIENT
Start: 2017-11-02 | End: 2018-09-19

## 2017-11-02 RX ADMIN — NICOTINE 1 PATCH: 21 PATCH, EXTENDED RELEASE TRANSDERMAL at 19:34

## 2017-11-02 RX ADMIN — SODIUM CHLORIDE 500 ML: 9 INJECTION, SOLUTION INTRAVENOUS at 12:50

## 2017-11-02 RX ADMIN — GADOBENATE DIMEGLUMINE 7 ML: 529 INJECTION, SOLUTION INTRAVENOUS at 14:00

## 2017-11-02 RX ADMIN — MECLIZINE 25 MG: 25 TABLET ORAL at 17:53

## 2017-11-02 RX ADMIN — ASPIRIN 81 MG 324 MG: 81 TABLET ORAL at 17:52

## 2017-11-02 NOTE — ED PROVIDER NOTES
Subjective   HPI Comments: Dalila Velez is a 67 y.o.female who presents to the ED with c/o dizziness which began 4 days ago. She reports experiencing constant dizziness. She states she feels as though she is spinning. Her dizziness worsens when going from sitting to standing but improves while lying still. She has no history of similar previous experiences. She was evaluated by her PCP, Dr. Couch for her symptoms. Her Bisoprolol dosage was increased at that time. She states her dizziness has since worsened which brings her to the ED today. She also c/o associated nausea, generalized headache, resolved BLE swelling, and palpitations but denies diarrhea, bloody stool, hematuria, urinary symptoms, cough, congestion, rhinorrhea, sore throat, syncope, leg pain, or any other complaints at this time. She has history of high cholesterol, hypertension, CAD with stent placement x 2, and endarterectomy. She has no history of asthma or COPD.     Patient is a 67 y.o. female presenting with dizziness.   History provided by:  Patient  Dizziness   Quality:  Head spinning  Severity:  Moderate  Onset quality:  Sudden  Duration:  4 days  Timing:  Constant  Progression:  Worsening  Chronicity:  New  Relieved by:  Being still and lying down  Worsened by:  Standing up  Ineffective treatments: Increasing Bisoprolol   Associated symptoms: headaches, nausea and palpitations    Associated symptoms: no blood in stool, no diarrhea, no syncope and no vomiting    Risk factors: heart disease        Review of Systems   HENT: Negative for congestion, rhinorrhea and sore throat.    Respiratory: Negative for cough.    Cardiovascular: Positive for palpitations and leg swelling. Negative for syncope.   Gastrointestinal: Positive for nausea. Negative for blood in stool, diarrhea and vomiting.   Genitourinary: Negative for difficulty urinating, dysuria, frequency, hematuria and urgency.   Neurological: Positive for dizziness and headaches. Negative  for syncope.   All other systems reviewed and are negative.      Past Medical History:   Diagnosis Date   • Anxiety    • Carotid artery disease    • Coronary artery disease    • Depression    • Dyslipidemia    • GERD (gastroesophageal reflux disease)    • Hypertension    • Hypothyroidism     Hypothyroidism with nodule.   • Peptic ulcer disease    • Renal artery stenosis        Allergies   Allergen Reactions   • Codeine Nausea And Vomiting       Past Surgical History:   Procedure Laterality Date   • CAROTID ENDARTERECTOMY Left 2006    Left carotid endarterectomy by Dr. Ramirez, 2006.    • CHOLECYSTECTOMY     • CORONARY ANGIOPLASTY      History of catheter-based interventions; incomplete database.    • RENAL ARTERY STENT Bilateral 2012    Status post bilateral renal artery stenting by Dr. Landis in 2012.  Right renal artery stented with a 4 mm stent.  Left renal was treated with a 5.0 x 15 mm bare-metal stent.     • TOTAL HIP ARTHROPLASTY  06/2017       Family History   Problem Relation Age of Onset   • Heart failure Mother    • Cancer Mother    • Heart disease Mother    • Coronary artery disease Father    • Heart disease Sister    • Heart disease Brother    • Coronary artery disease Brother    • Heart attack Brother    • No Known Problems Maternal Grandmother    • No Known Problems Maternal Grandfather    • No Known Problems Paternal Grandmother    • No Known Problems Paternal Grandfather    • Heart failure Brother    • Coronary artery disease Brother    • Heart disease Brother    • Coronary artery disease Sister    • Coronary artery disease Sister    • Coronary artery disease Sister    • Coronary artery disease Sister        Social History     Social History   • Marital status:      Spouse name: N/A   • Number of children: N/A   • Years of education: N/A     Social History Main Topics   • Smoking status: Current Every Day Smoker     Packs/day: 1.00     Years: 40.00     Types: Cigarettes   • Smokeless tobacco:  Never Used   • Alcohol use No   • Drug use: No   • Sexual activity: Defer     Other Topics Concern   • None     Social History Narrative    Patient consumes 3-4 cups coffee, 1 cup soda daily.     Patient lives at home alone.              Objective   Physical Exam   Constitutional: She is oriented to person, place, and time. She appears well-developed and well-nourished. No distress.   HENT:   Head: Normocephalic and atraumatic.   Right Ear: External ear normal.   Left Ear: External ear normal.   Nose: Nose normal.   Chronic changes to the right and left TM without acute abnormality.   Eyes: Conjunctivae and EOM are normal. Pupils are equal, round, and reactive to light. No scleral icterus.   Visual fields intact. Mild nystagmus with a few beats before head shake. Several beats left sided nystagmus after head shake.    Neck: Normal range of motion. Neck supple.   Cardiovascular: Regular rhythm and normal heart sounds.  Bradycardia present.    No murmur heard.  The patient states her heart rate runs in the 50's at baseline.    Pulmonary/Chest: Effort normal and breath sounds normal. No respiratory distress.   Abdominal: Soft. Bowel sounds are normal. There is no tenderness.   Musculoskeletal: Normal range of motion. She exhibits no edema.   No C/C/E or stigmata DVT.    Neurological: She is alert and oriented to person, place, and time. She has normal reflexes. No cranial nerve deficit (All are intact ).   Normal sensation to touch. Strength intact, 5/5 motor function. Normal finger to nose testing. Equal  strength. Able to move all four extremities normally. Ayana intact. Visual fields are intact.   Skin: Skin is warm and dry.   Psychiatric: She has a normal mood and affect. Her behavior is normal.   Nursing note and vitals reviewed.      Procedures         ED Course  ED Course   Comment By Time   Spoke with Dr. Estrella wo recommends a MRA of the head due to her high grade stenosis. The MRA has been ordered. Rianna  JUAN Dover 11/02 1553   She does serially reevaluated throughout the ED course to date.  She has not objectively orthostatic.  She has no focal deficit.  She continues to have some generalized dizziness, this is been ongoing however with last known well seemingly greater than 24 hours.I discussed the recommended MRA, her significant carotid stenosis, absolute need for risk factor modification and smoking cessation.  I discussed smoking cessation at length with patient and family.  Are awaiting MR angiogram of the brain currently. Mark Arthur MD 11/02 1638   Patient reports she is symptomatically improved and her dizziness has diminished.  She continues have no focal neurologic deficit.  MRA is pending. Mark Arthur MD 11/02 1715   MRI Brain reveals no CVA.  She reports she had an 81 mg aspirin this morning Mark Arthur MD 11/02 1715   I discussed findings with Dr. Breen.  The patient started on maximum dose Crestor.  She is on 81 mg aspirin.  He said suggested that the patient's aspirin be increased to 325 mg, with follow-up for lipid control, blood pressure regulation, but most importantly smoking cessation.I discussed findings with the patient.  She was not orthostatic.  Her heart rate is range primarily between 52 and 54 which she reports is about at her baseline.  The lowest heart rate visualized by me was 48 which she reports is not significantly below her norm.  She was not objectively orthostatic even with her bradycardia.  I've asked her to follow up with Dr. Couch for continued evaluation treatment of her hypertension as well as her hypercholesterolemia, continued treatment of smoking cessation, and general medical careI discussed a quit date of today and will treated with a NicoDerm patches. Mark Arthur MD 11/02 8846   I discussed neurologic follow-up with Dr. Breen in view of dizziness this and the vascular disease.I discussed follow-up with ENT for consideration of labyrinthine  physical therapy if her symptoms don't progressively and rapidly improve, and discussed indications for immediate return including any acute symptomsVery pleasant and responsible patient and daughter verbalize understanding agreement with the plan of care. Mark Arthur MD 11/02 1857     Recent Results (from the past 24 hour(s))   Comprehensive Metabolic Panel    Collection Time: 11/02/17 12:11 PM   Result Value Ref Range    Glucose 99 70 - 100 mg/dL    BUN 23 9 - 23 mg/dL    Creatinine 1.40 (H) 0.60 - 1.30 mg/dL    Sodium 139 132 - 146 mmol/L    Potassium 4.5 3.5 - 5.5 mmol/L    Chloride 107 99 - 109 mmol/L    CO2 22.0 20.0 - 31.0 mmol/L    Calcium 9.7 8.7 - 10.4 mg/dL    Total Protein 7.7 5.7 - 8.2 g/dL    Albumin 5.00 (H) 3.20 - 4.80 g/dL    ALT (SGPT) 20 7 - 40 U/L    AST (SGOT) 26 0 - 33 U/L    Alkaline Phosphatase 112 (H) 25 - 100 U/L    Total Bilirubin 0.6 0.3 - 1.2 mg/dL    eGFR Non African Amer 38 (L) >60 mL/min/1.73    Globulin 2.7 gm/dL    A/G Ratio 1.9 1.5 - 2.5 g/dL    BUN/Creatinine Ratio 16.4 7.0 - 25.0    Anion Gap 10.0 3.0 - 11.0 mmol/L   Magnesium    Collection Time: 11/02/17 12:11 PM   Result Value Ref Range    Magnesium 2.1 1.3 - 2.7 mg/dL   Light Blue Top    Collection Time: 11/02/17 12:11 PM   Result Value Ref Range    Extra Tube hold for add-on    Green Top (Gel)    Collection Time: 11/02/17 12:11 PM   Result Value Ref Range    Extra Tube Hold for add-ons.    Lavender Top    Collection Time: 11/02/17 12:11 PM   Result Value Ref Range    Extra Tube hold for add-on    Gold Top - SST    Collection Time: 11/02/17 12:11 PM   Result Value Ref Range    Extra Tube Hold for add-ons.    CBC Auto Differential    Collection Time: 11/02/17 12:11 PM   Result Value Ref Range    WBC 10.91 (H) 3.50 - 10.80 10*3/mm3    RBC 5.06 3.89 - 5.14 10*6/mm3    Hemoglobin 14.3 11.5 - 15.5 g/dL    Hematocrit 43.8 34.5 - 44.0 %    MCV 86.6 80.0 - 99.0 fL    MCH 28.3 27.0 - 31.0 pg    MCHC 32.6 32.0 - 36.0 g/dL    RDW  15.9 (H) 11.3 - 14.5 %    RDW-SD 50.1 37.0 - 54.0 fl    MPV 11.9 6.0 - 12.0 fL    Platelets 204 150 - 450 10*3/mm3    Neutrophil % 72.0 (H) 41.0 - 71.0 %    Lymphocyte % 20.4 (L) 24.0 - 44.0 %    Monocyte % 5.9 0.0 - 12.0 %    Eosinophil % 1.3 0.0 - 3.0 %    Basophil % 0.2 0.0 - 1.0 %    Immature Grans % 0.2 0.0 - 0.6 %    Neutrophils, Absolute 7.86 1.50 - 8.30 10*3/mm3    Lymphocytes, Absolute 2.23 0.60 - 4.80 10*3/mm3    Monocytes, Absolute 0.64 0.00 - 1.00 10*3/mm3    Eosinophils, Absolute 0.14 0.00 - 0.30 10*3/mm3    Basophils, Absolute 0.02 0.00 - 0.20 10*3/mm3    Immature Grans, Absolute 0.02 0.00 - 0.03 10*3/mm3   BNP    Collection Time: 11/02/17 12:11 PM   Result Value Ref Range    .0 (H) 0.0 - 100.0 pg/mL   T4, Free    Collection Time: 11/02/17 12:11 PM   Result Value Ref Range    Free T4 1.52 0.89 - 1.76 ng/dL   TSH    Collection Time: 11/02/17 12:11 PM   Result Value Ref Range    TSH 0.660 0.350 - 5.350 mIU/mL   POC Troponin, Rapid    Collection Time: 11/02/17 12:18 PM   Result Value Ref Range    Troponin I 0.00 0.00 - 0.07 ng/mL   Urinalysis With / Culture If Indicated - Urine, Clean Catch    Collection Time: 11/02/17  1:01 PM   Result Value Ref Range    Color, UA Dark Yellow (A) Yellow, Straw    Appearance, UA Clear Clear    pH, UA 6.5 5.0 - 8.0    Specific Gravity, UA 1.014 1.001 - 1.030    Glucose, UA Negative Negative    Ketones, UA Negative Negative    Bilirubin, UA Small (1+) (A) Negative    Blood, UA Negative Negative    Protein, UA 30 mg/dL (1+) (A) Negative    Leuk Esterase, UA Trace (A) Negative    Nitrite, UA Negative Negative    Urobilinogen, UA 1.0 E.U./dL 0.2 - 1.0 E.U./dL   Urinalysis, Microscopic Only - Urine, Clean Catch    Collection Time: 11/02/17  1:01 PM   Result Value Ref Range    RBC, UA 0-2 None Seen, 0-2 /HPF    WBC, UA 3-5 (A) None Seen /HPF    Bacteria, UA None Seen None Seen, Trace /HPF    Squamous Epithelial Cells, UA 3-6 (A) None Seen, 0-2 /HPF    Hyaline Casts, UA  None Seen 0 - 6 /LPF    Methodology Manual Light Microscopy    POC Troponin, Rapid    Collection Time: 11/02/17  3:02 PM   Result Value Ref Range    Troponin I 0.00 0.00 - 0.07 ng/mL     Note: In addition to lab results from this visit, the labs listed above may include labs taken at another facility or during a different encounter within the last 24 hours. Please correlate lab times with ED admission and discharge times for further clarification of the services performed during this visit.    XR Chest 1 View   Preliminary Result   No new chest disease.       D:  11/02/2017   E:  11/02/2017          MRI Brain With & Without Contrast    (Results Pending)   MRI Angiogram Neck Without Contrast    (Results Pending)   MRI Angiogram Head Without Contrast    (Results Pending)     Vitals:    11/02/17 1213 11/02/17 1215 11/02/17 1218 11/02/17 1837   BP: 146/63 140/63 139/53 174/73   BP Location:       Patient Position: Lying Sitting Standing    Pulse: 53   50   Resp:    18   Temp:       TempSrc:       SpO2:       Weight:       Height:         Medications   sodium chloride 0.9 % flush 10 mL (not administered)   sodium chloride 0.9 % infusion (0 mL/hr Intravenous Stopped 11/2/17 1753)   nicotine (NICODERM CQ) 21 MG/24HR patch 1 patch (not administered)   sodium chloride 0.9 % bolus 500 mL (0 mL Intravenous Stopped 11/2/17 1320)   gadobenate dimeglumine (MULTIHANCE) injection 7 mL (7 mL Intravenous Given 11/2/17 1400)   aspirin chewable tablet 324 mg (324 mg Oral Given 11/2/17 1752)   meclizine (ANTIVERT) tablet 25 mg (25 mg Oral Given 11/2/17 1753)     ECG/EMG Results (last 24 hours)     Procedure Component Value Units Date/Time    ECG 12 Lead [[id number]] Collected:  11/02/17 1211     Updated:  11/02/17 1250    Narrative:       Test Reason : Weak/Dizzy/AMS protocol  Blood Pressure : **/** mmHG  Vent. Rate : 054 BPM     Atrial Rate : 054 BPM     P-R Int : 140 ms          QRS Dur : 078 ms      QT Int : 460 ms       P-R-T Axes :  060 040 040 degrees     QTc Int : 436 ms    Sinus bradycardia  Otherwise normal ECG  When compared with ECG of 16-AUG-2017 19:30,  No significant change was found  Confirmed by KEYSHAWN GARCIA, ELBA ([id number]) on 11/2/2017 12:50:03 PM    Referred By:  KEYSHAWN           Confirmed By:ELBA ARTHUR MD    ECG 12 Lead [[id number]] Collected:  11/02/17 1457     Updated:  11/02/17 1518                          MDM    Final diagnoses:   Dizziness   Bilateral carotid artery disease   Tobacco abuse   Tobacco abuse counseling   Renal insufficiency   Arteriosclerotic cerebrovascular disease   Labyrinthitis, acute, unspecified laterality       Documentation assistance provided by kong Dover.  Information recorded by the scribe was done at my direction and has been verified and validated by me.     Rianna Dover  11/02/17 1235       Rianna Dover  11/02/17 1528       Mark Arthur MD  11/02/17 0455

## 2017-11-02 NOTE — DISCHARGE INSTRUCTIONS
Take meclizine as needed for dizziness.  He may take this every 8 hours.  He may take over-the-counter Dramamine instead, as it is very effective, but more sedating than meclizine.    No driving until your vertigo has completely resolved for 24 hours, then drive with caution as you may have recurrent symptoms    Increase her aspirin to a coated 325 mg full-strength aspirin instead of the baby aspirin you're taking    Absolute and complete tobacco cessation today.  Use the NicoDerm patches as directed.  Discussed additional smoking cessation modalities with your primary care provider in the office on follow-up including other medications that may be effective for you    Continue your close follow-up with Dr. Couch and follow-up within the next week.  Your heart rate was mildly decreased today, but it sounds like not significantly changed from her baseline, and Dr. Couch needs to evaluate your current dose of bisoprolol, your cholesterol and lipid panel, and other therapies that may help your vascular disease    Follow-up with Dr. Breen for neurological evaluation in view of your vascular disease and your neck and brain.  Have him follow-up here dizziness as well.  Call for an appointment    If you have persistent dizziness and vertigo that does not progressively and rapidly improve, follow-up with ENT physician Dr. Perales for consideration of inner ear physical therapy    Immediate return to the emergency department if you have any acute, urgent, emergent or progressive symptoms as discussed

## 2017-12-05 ENCOUNTER — OFFICE VISIT (OUTPATIENT)
Dept: CARDIOLOGY | Facility: CLINIC | Age: 67
End: 2017-12-05

## 2017-12-05 VITALS
HEART RATE: 57 BPM | SYSTOLIC BLOOD PRESSURE: 136 MMHG | DIASTOLIC BLOOD PRESSURE: 78 MMHG | WEIGHT: 148 LBS | HEIGHT: 68 IN | BODY MASS INDEX: 22.43 KG/M2

## 2017-12-05 DIAGNOSIS — I79.8 OTHER DISORDERS OF ARTERIES, ARTERIOLES AND CAPILLARIES IN DISEASES CLASSIFIED ELSEWHERE (HCC): ICD-10-CM

## 2017-12-05 DIAGNOSIS — I77.9 BILATERAL CAROTID ARTERY DISEASE (HCC): Primary | ICD-10-CM

## 2017-12-05 DIAGNOSIS — E78.5 DYSLIPIDEMIA: ICD-10-CM

## 2017-12-05 DIAGNOSIS — R42 VERTIGO: ICD-10-CM

## 2017-12-05 DIAGNOSIS — I10 ESSENTIAL HYPERTENSION: ICD-10-CM

## 2017-12-05 DIAGNOSIS — Z72.0 TOBACCO ABUSE: ICD-10-CM

## 2017-12-05 PROCEDURE — 99213 OFFICE O/P EST LOW 20 MIN: CPT | Performed by: INTERNAL MEDICINE

## 2017-12-05 NOTE — PROGRESS NOTES
Subjective:     Encounter Date:12/05/2017      Patient ID: Dalila Vleez is a 67 y.o. female.    Chief Complaint: Coronary Artery Disease      PROBLEM LIST:  1. Coronary artery disease:  a. History of catheter-based interventions; incomplete database.   b. Normal Regadenoson myocardial perfusion study, January 2015And 2/17  2. Renal artery stenosis:  a. Status post bilateral renal artery stenting by Dr. Landis in 2012. Right renal artery stented with a 4 mm stent. Left renal was treated with a 5.0 x 15 mm bare-metal stent.   3. Carotid disease:  a. Left carotid endarterectomy by Dr. Ramirez, 2006.   b. 11/17.  MRA 70% left ICA, 30% right ICA.  4. Hypertension.  5. Dyslipidemia.  6. Ongoing tobacco abuse.  7. Peptic ulcer disease.  8. GERD.  9. Hypothyroidism with nodule.  10. Cholecystectomy.   11. Anxiety/depression.    History of Present Illness  Patient returns today for follow up with a history of history of dizziness, intravascular disease.  Since her last visit, patient had done well until pressure 2 months ago.  She saw her primary care physician for dizziness she describes as brain spinning.  She was sent to the emergency room, where she noted about both heart rate (had recently increase her bisoprolol dosage).  She is treated for inner ear infection with antibiotics and steroids and symptoms persisted.  She presented to emergency room several weeks later with the exact same symptoms.  Had an MRA of the brain which is normal, MRI angiogram showed a 70% right ICA stenosis.  She was again diagnosed with in her ear/vertigo symptoms, and her symptoms essentially resolved over the last 2-3 weeks.  Overall she feels good today.  Her blood pressure has been running low at home per her.  She is taking clonidine in the evenings.  She is had no presyncope or syncope.  Denies chest pain or any change in chronic dyspnea..     Allergies   Allergen Reactions   • Codeine Nausea And Vomiting         Current Outpatient  Prescriptions:   •  aspirin  MG tablet, Take 1 tablet by mouth Daily., Disp: 30 tablet, Rfl: 0  •  bisoprolol (ZEBeta) 5 MG tablet, Take 5 mg by mouth Daily  , Disp: , Rfl:   •  CLONIDINE HCL PO, Take by mouth Daily, Disp: , Rfl:   •  diazePAM (VALIUM) 5 MG tablet, Take 5 mg by mouth Daily As Needed for anxiety., Disp: , Rfl:   •  levothyroxine (SYNTHROID, LEVOTHROID) 100 MCG tablet, Take 100 mcg by mouth Daily., Disp: , Rfl:   •  meclizine (ANTIVERT) 25 MG tablet, Take 1 tablet by mouth 3 (Three) Times a Day As Needed for dizziness., Disp: 20 tablet, Rfl: 0  •  NIFEdipine CC (ADALAT CC) 30 MG 24 hr tablet, Take 30 mg by mouth Daily., Disp: , Rfl:   •  nitroglycerin (NITROSTAT) 0.4 MG SL tablet, 1 under the tongue as needed for angina, may repeat q5mins for up three doses, Disp: 100 tablet, Rfl: 3  •  omeprazole (priLOSEC) 40 MG capsule, Take 40 mg by mouth Daily., Disp: , Rfl:   •  PARoxetine (PAXIL) 20 MG tablet, Take 20 mg by mouth Daily., Disp: , Rfl:   •  rosuvastatin (CRESTOR) 40 MG tablet, Take 1 tablet by mouth Every Night., Disp: 90 tablet, Rfl: 3  •  traZODone (DESYREL) 150 MG tablet, Take 150 mg by mouth Every Night., Disp: , Rfl:     The following portions of the patient's history were reviewed and updated as appropriate: allergies, current medications, past family history, past medical history, past social history, past surgical history and problem list.    Review of Systems   Constitution: Positive for malaise/fatigue. Negative for fever and weakness.   HENT: Negative for nosebleeds.    Eyes: Negative for redness and visual disturbance.   Cardiovascular: Negative for orthopnea, palpitations and paroxysmal nocturnal dyspnea.   Respiratory: Positive for shortness of breath. Negative for cough, snoring, sputum production and wheezing.    Hematologic/Lymphatic: Negative for bleeding problem.   Skin: Negative for flushing, itching and rash.   Musculoskeletal: Negative for falls, joint pain and muscle  "cramps.   Gastrointestinal: Negative for abdominal pain, diarrhea, heartburn, nausea and vomiting.   Genitourinary: Negative for hematuria.   Neurological: Positive for vertigo. Negative for excessive daytime sleepiness, dizziness, headaches and tremors.   Psychiatric/Behavioral: Negative for substance abuse. The patient is not nervous/anxious.           Objective:   Blood pressure 136/78, pulse 57, height 172.7 cm (68\"), weight 67.1 kg (148 lb).      Physical Exam   Constitutional: She is oriented to person, place, and time. She appears well-developed and well-nourished.   HENT:   Mouth/Throat: Oropharynx is clear and moist.   Neck: No JVD present. Carotid bruit is not present. No thyromegaly present.   Cardiovascular: Regular rhythm, S1 normal, S2 normal, normal heart sounds and intact distal pulses.  Exam reveals no gallop, no S3 and no S4.    No murmur heard.  Pulses:       Carotid pulses are 2+ on the right side, and 2+ on the left side.       Radial pulses are 2+ on the right side, and 2+ on the left side.   Pulmonary/Chest: Breath sounds normal.   Abdominal: Soft. Bowel sounds are normal. She exhibits no mass. There is no tenderness.   Musculoskeletal: She exhibits no edema.   Neurological: She is alert and oriented to person, place, and time.   Skin: Skin is warm and dry. No rash noted.       Lab Review:    Procedures        Assessment:   Dalila was seen today for coronary artery disease.    Diagnoses and all orders for this visit:    Bilateral carotid artery disease    Dyslipidemia    Tobacco abuse    Essential hypertension    Vertigo      Impression  1. Cerebrovascular disease.  Unilateral 70% right ICA stenosis.  Status post remote left CEA with nonflow limiting disease.  This is asymptomatic.  Continue to follow clinically.  2. Dyslipidemia on high-dose statin  3. Tobacco abuse ongoing.  4. Hypertension controlled on current medical therapy  5. Bradycardia, resolved without change in medical " therapy.  6. Dizziness/vertigo.  Resolved.    Plan:  1. Do not think the recent vertigo was cardiovascular related.  It sounds to be improved with treatment of her inner ear abnormalities.  No further evaluation needed.  2. Follow heart vascular disease for now in the absence of symptoms.  No indication for repeat CEA/stenting at this time.  3. Bradycardia is resolved.  We'll continue her current dose beta blocker.  If this recurs, we will simply reduce her dosage, and perhaps increase her nifedipine for antihypertensives.  4. Revisit in 9 MO, or sooner as needed.    Lico Nixon MD

## 2018-09-05 ENCOUNTER — HOSPITAL ENCOUNTER (OUTPATIENT)
Dept: CARDIOLOGY | Facility: HOSPITAL | Age: 68
Discharge: HOME | End: 2018-09-05
Attending: INTERNAL MEDICINE | Admitting: INTERNAL MEDICINE

## 2018-09-05 VITALS — WEIGHT: 148 LBS | HEIGHT: 68 IN | BODY MASS INDEX: 22.43 KG/M2

## 2018-09-05 DIAGNOSIS — I77.9 BILATERAL CAROTID ARTERY DISEASE (HCC): ICD-10-CM

## 2018-09-05 DIAGNOSIS — I79.8 OTHER DISORDERS OF ARTERIES, ARTERIOLES AND CAPILLARIES IN DISEASES CLASSIFIED ELSEWHERE (HCC): ICD-10-CM

## 2018-09-05 PROCEDURE — 93880 EXTRACRANIAL BILAT STUDY: CPT | Performed by: INTERNAL MEDICINE

## 2018-09-05 PROCEDURE — 93880 EXTRACRANIAL BILAT STUDY: CPT

## 2018-09-06 LAB
BH CV ECHO MEAS - BSA(HAYCOCK): 1.8 M^2
BH CV ECHO MEAS - BSA: 1.8 M^2
BH CV ECHO MEAS - BZI_BMI: 22.5 KILOGRAMS/M^2
BH CV ECHO MEAS - BZI_METRIC_HEIGHT: 172.7 CM
BH CV ECHO MEAS - BZI_METRIC_WEIGHT: 67.1 KG
BH CV XLRA MEAS LEFT CCA RATIO VEL: 75.1 CM/SEC
BH CV XLRA MEAS LEFT DIST CCA EDV: 7.7 CM/SEC
BH CV XLRA MEAS LEFT DIST CCA PSV: 76.6 CM/SEC
BH CV XLRA MEAS LEFT DIST ICA EDV: 28.1 CM/SEC
BH CV XLRA MEAS LEFT DIST ICA PSV: 138.1 CM/SEC
BH CV XLRA MEAS LEFT ICA RATIO VEL: 177 CM/SEC
BH CV XLRA MEAS LEFT ICA/CCA RATIO: 2.4
BH CV XLRA MEAS LEFT MID CCA EDV: 17.5 CM/SEC
BH CV XLRA MEAS LEFT MID CCA PSV: 75.5 CM/SEC
BH CV XLRA MEAS LEFT MID ICA EDV: 44.9 CM/SEC
BH CV XLRA MEAS LEFT MID ICA PSV: 176.2 CM/SEC
BH CV XLRA MEAS LEFT PROX CCA EDV: 13.1 CM/SEC
BH CV XLRA MEAS LEFT PROX CCA PSV: 54.1 CM/SEC
BH CV XLRA MEAS LEFT PROX ECA PSV: 289.1 CM/SEC
BH CV XLRA MEAS LEFT PROX ICA EDV: 52.8 CM/SEC
BH CV XLRA MEAS LEFT PROX ICA PSV: 178.5 CM/SEC
BH CV XLRA MEAS LEFT PROX SCLA PSV: 192.3 CM/SEC
BH CV XLRA MEAS LEFT VERTEBRAL A PSV: 62.3 CM/SEC
BH CV XLRA MEAS RIGHT CCA RATIO VEL: 78.9 CM/SEC
BH CV XLRA MEAS RIGHT DIST CCA EDV: 14 CM/SEC
BH CV XLRA MEAS RIGHT DIST CCA PSV: 63.5 CM/SEC
BH CV XLRA MEAS RIGHT DIST ICA EDV: 35.1 CM/SEC
BH CV XLRA MEAS RIGHT DIST ICA PSV: 155.7 CM/SEC
BH CV XLRA MEAS RIGHT ICA RATIO VEL: 217 CM/SEC
BH CV XLRA MEAS RIGHT ICA/CCA RATIO: 2.8
BH CV XLRA MEAS RIGHT MID CCA EDV: 16.1 CM/SEC
BH CV XLRA MEAS RIGHT MID CCA PSV: 79.6 CM/SEC
BH CV XLRA MEAS RIGHT MID ICA EDV: 37.9 CM/SEC
BH CV XLRA MEAS RIGHT MID ICA PSV: 218.9 CM/SEC
BH CV XLRA MEAS RIGHT PROX CCA EDV: 16.5 CM/SEC
BH CV XLRA MEAS RIGHT PROX CCA PSV: 88.8 CM/SEC
BH CV XLRA MEAS RIGHT PROX ECA PSV: 191.1 CM/SEC
BH CV XLRA MEAS RIGHT PROX ICA EDV: 41.3 CM/SEC
BH CV XLRA MEAS RIGHT PROX ICA PSV: 120.8 CM/SEC
BH CV XLRA MEAS RIGHT PROX SCLA PSV: 155.2 CM/SEC
BH CV XLRA MEAS RIGHT VERTEBRAL A PSV: 63.5 CM/SEC
RIGHT ARM BP: NORMAL MMHG

## 2018-09-07 ENCOUNTER — TELEPHONE (OUTPATIENT)
Dept: CARDIOLOGY | Facility: CLINIC | Age: 68
End: 2018-09-07

## 2018-09-07 NOTE — TELEPHONE ENCOUNTER
Called, spole with patient,  and advised Dr Nixon has reviewed ultrasound, no significant change and he will discuss with her at visit next week.

## 2018-09-07 NOTE — TELEPHONE ENCOUNTER
----- Message from Lico Nixon MD sent at 9/6/2018  6:07 PM EDT -----  No significant change, will discuss it revisit

## 2018-09-19 ENCOUNTER — OFFICE VISIT (OUTPATIENT)
Dept: CARDIOLOGY | Facility: CLINIC | Age: 68
End: 2018-09-19

## 2018-09-19 VITALS
BODY MASS INDEX: 22.54 KG/M2 | DIASTOLIC BLOOD PRESSURE: 83 MMHG | HEIGHT: 68 IN | SYSTOLIC BLOOD PRESSURE: 137 MMHG | HEART RATE: 65 BPM | WEIGHT: 148.7 LBS

## 2018-09-19 DIAGNOSIS — I25.10 CORONARY ARTERY DISEASE INVOLVING NATIVE CORONARY ARTERY OF NATIVE HEART WITHOUT ANGINA PECTORIS: Primary | ICD-10-CM

## 2018-09-19 DIAGNOSIS — I10 ESSENTIAL HYPERTENSION: ICD-10-CM

## 2018-09-19 DIAGNOSIS — Z72.0 TOBACCO ABUSE: ICD-10-CM

## 2018-09-19 DIAGNOSIS — E78.5 DYSLIPIDEMIA: ICD-10-CM

## 2018-09-19 PROCEDURE — 99214 OFFICE O/P EST MOD 30 MIN: CPT | Performed by: PHYSICIAN ASSISTANT

## 2018-09-19 PROCEDURE — 99406 BEHAV CHNG SMOKING 3-10 MIN: CPT | Performed by: PHYSICIAN ASSISTANT

## 2018-09-19 NOTE — PROGRESS NOTES
[facility] at [facility] - Office Note  Dalila Velez         [address] [place] [address]  1950   [phone] (home)      LOCATION:   office.  Visit Type: Follow Up.    PCP:  Frannie Couch MD    09/19/18   Dalila Velez is a 68 y.o.  female  retired.      Chief Complaint:   FU on CAD, HTN, HLP  PROBLEM LIST:  1. Coronary artery disease:  a. History of catheter-based interventions; incomplete database.   b. Normal Regadenoson myocardial perfusion study, January 2015And 2/17  2. Renal artery stenosis:  a. Status post bilateral renal artery stenting by Dr. Landis in 2012. Right renal artery stented with a 4 mm stent. Left renal was treated with a 5.0 x 15 mm bare-metal stent.   3. Carotid disease:  a. Left carotid endarterectomy by Dr. Ramirez, 2006.   b. 11/17.  MRA 70% left ICA, 30% right ICA.  4. Essential Hypertension.  5. Dyslipidemia.  6. Ongoing tobacco abuse.  7. Peptic ulcer disease.  8. GERD.  9. Hypothyroidism with nodule.  10. Cholecystectomy.   11. Anxiety/depression.         Allergies   Allergen Reactions   • Codeine Nausea And Vomiting         Current Outpatient Prescriptions:   •  aspirin  MG tablet, Take 1 tablet by mouth Daily., Disp: 30 tablet, Rfl: 0  •  bisoprolol (ZEBeta) 5 MG tablet, Take 5 mg by mouth Daily  , Disp: , Rfl:   •  CLONIDINE HCL PO, Take by mouth Daily, Disp: , Rfl:   •  diazePAM (VALIUM) 5 MG tablet, Take 5 mg by mouth Daily As Needed for anxiety., Disp: , Rfl:   •  levothyroxine (SYNTHROID, LEVOTHROID) 100 MCG tablet, Take 100 mcg by mouth Daily., Disp: , Rfl:   •  NIFEdipine CC (ADALAT CC) 30 MG 24 hr tablet, Take 30 mg by mouth Daily., Disp: , Rfl:   •  nitroglycerin (NITROSTAT) 0.4 MG SL tablet, 1 under the tongue as needed for angina, may repeat q5mins for up three doses, Disp: 100 tablet, Rfl: 3  •  omeprazole (priLOSEC) 40 MG capsule, Take 40 mg by mouth Daily., Disp: , Rfl:   •  PARoxetine (PAXIL) 20 MG tablet, Take 20  "mg by mouth Daily., Disp: , Rfl:   •  rosuvastatin (CRESTOR) 40 MG tablet, Take 1 tablet by mouth Every Night., Disp: 90 tablet, Rfl: 3  •  traZODone (DESYREL) 150 MG tablet, Take 150 mg by mouth Every Night., Disp: , Rfl:     HPI  Dalila Velez is here today for routine follow-up on coronary artery disease, controlled hypertension, and dyslipidemia.  She is doing well from a cardiac standpoint.  She has no complaints of chest pain, no dyspnea, no weakness.  She denies stroke or TIA like symptoms.  Since we last saw her, she has followed with nephrology regarding her renal issues.  She is actually undergone a renal biopsy but cannot recall what the findings were.  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  She continues to smoke one pack per day cigarettes and is not interested in completely quitting at this time.      The following portions of the patient's history were reviewed in the chart and updated as appropriate: allergies, current medications, past family history, past medical history, past social history, past surgical history and problem list.    Review of Systems   Constitution: Negative for weakness, malaise/fatigue, weight gain and weight loss.   Cardiovascular: Positive for leg swelling. Negative for chest pain, dyspnea on exertion, orthopnea and palpitations.   Respiratory: Negative for cough, hemoptysis, shortness of breath, sputum production and wheezing.    Musculoskeletal: Positive for arthritis and stiffness.   All other systems reviewed and are negative.            height is 172.7 cm (68\") and weight is 67.4 kg (148 lb 11.2 oz). Her blood pressure is 137/83 and her pulse is 65.   Physical Exam   Constitutional: Vital signs are normal. She appears well-developed and well-nourished.   Cardiovascular: Normal rate, regular rhythm, S1 normal, S2 normal, normal heart sounds, intact distal pulses and normal pulses.    Pulmonary/Chest: Effort normal and " breath sounds normal. She has no wheezes. She has no rhonchi. She has no rales.   Abdominal: Soft. Normal appearance and bowel sounds are normal. There is no hepatosplenomegaly.   Neurological: She is alert.         Procedures     Assessment/ Plan   Coronary artery disease involving native coronary artery of native heart without angina pectoris:  Stable and asymptomatic.  Continue with current medical regimen and RTC 1 year with EKG or sooner.    Essential hypertension:  Well controlled. Continue current medical regimen. Continue to follow with PCP and Nephrology regarding renal history.    Dyslipidemia:  Continue Crestor and appropriate diet.  Get annual lipid panel and CMP.    Tobacco abuse: I advised Dalila of the risks of continuing to use tobacco, and I provided her with tobacco cessation educational materials in the After Visit Summary. During this visit, I spent 3 minutes counseling the patient regarding tobacco cessation.  Information about Chantix being available for free or a lower cost was provided.          Ani Wilson PA-C functioning independently.  9/19/2018 1:59 PM

## 2019-09-25 ENCOUNTER — OFFICE VISIT (OUTPATIENT)
Dept: CARDIOLOGY | Facility: CLINIC | Age: 69
End: 2019-09-25

## 2019-09-25 VITALS
SYSTOLIC BLOOD PRESSURE: 150 MMHG | WEIGHT: 154 LBS | BODY MASS INDEX: 23.34 KG/M2 | HEART RATE: 54 BPM | OXYGEN SATURATION: 96 % | DIASTOLIC BLOOD PRESSURE: 68 MMHG | HEIGHT: 68 IN

## 2019-09-25 DIAGNOSIS — I71.40 AAA (ABDOMINAL AORTIC ANEURYSM) WITHOUT RUPTURE (HCC): ICD-10-CM

## 2019-09-25 DIAGNOSIS — E78.5 DYSLIPIDEMIA: ICD-10-CM

## 2019-09-25 DIAGNOSIS — I25.10 CORONARY ARTERY DISEASE INVOLVING NATIVE CORONARY ARTERY OF NATIVE HEART WITHOUT ANGINA PECTORIS: Primary | ICD-10-CM

## 2019-09-25 DIAGNOSIS — I77.9 BILATERAL CAROTID ARTERY DISEASE, UNSPECIFIED TYPE (HCC): ICD-10-CM

## 2019-09-25 DIAGNOSIS — I10 ESSENTIAL HYPERTENSION: ICD-10-CM

## 2019-09-25 DIAGNOSIS — Z48.812 ENCOUNTER FOR POSTOPERATIVE CAROTID ENDARTERECTOMY SURVEILLANCE: ICD-10-CM

## 2019-09-25 DIAGNOSIS — Z72.0 TOBACCO ABUSE: ICD-10-CM

## 2019-09-25 DIAGNOSIS — I77.1 ARTERY STENOSIS (HCC): ICD-10-CM

## 2019-09-25 PROCEDURE — 93000 ELECTROCARDIOGRAM COMPLETE: CPT | Performed by: INTERNAL MEDICINE

## 2019-09-25 PROCEDURE — 99214 OFFICE O/P EST MOD 30 MIN: CPT | Performed by: INTERNAL MEDICINE

## 2019-09-25 NOTE — PROGRESS NOTES
Subjective:     Encounter Date:09/25/2019    Primary Care Physician: Frannie Couch MD      Patient ID: Dalila Velez is a 69 y.o. female.    Chief Complaint:Follow-up    PROBLEM LIST:  1. Coronary artery disease:  a. History of catheter-based interventions; incomplete database.   b. Normal Regadenoson myocardial perfusion study, January 2015And 2/17  2. Renal artery stenosis:  a. Status post bilateral renal artery stenting by Dr. Landis in 2012. Right renal artery stented with a 4 mm stent. Left renal was treated with a 5.0 x 15 mm bare-metal stent.   3. Carotid disease:  a. Left carotid endarterectomy by Dr. Ramirez, 2006.   b. 11/17.  MRA 70% left ICA, 30% right ICA.  4. AAA  5. Essential Hypertension.  6. Dyslipidemia.  7. Ongoing tobacco abuse.  8. Peptic ulcer disease.  9. GERD.  10. Hypothyroidism with nodule.  11. Cholecystectomy.   12. Anxiety/depression.  13. Left hip replacement 2017      Allergies   Allergen Reactions   • Codeine Nausea And Vomiting         Current Outpatient Medications:   •  aspirin  MG tablet, Take 1 tablet by mouth Daily., Disp: 30 tablet, Rfl: 0  •  bisoprolol (ZEBeta) 5 MG tablet, Take 5 mg by mouth Daily  , Disp: , Rfl:   •  diazePAM (VALIUM) 5 MG tablet, Take 5 mg by mouth Daily As Needed for anxiety., Disp: , Rfl:   •  levothyroxine (SYNTHROID, LEVOTHROID) 100 MCG tablet, Take 100 mcg by mouth Daily., Disp: , Rfl:   •  NIFEdipine CC (ADALAT CC) 30 MG 24 hr tablet, Take 30 mg by mouth Daily., Disp: , Rfl:   •  nitroglycerin (NITROSTAT) 0.4 MG SL tablet, 1 under the tongue as needed for angina, may repeat q5mins for up three doses, Disp: 100 tablet, Rfl: 3  •  omeprazole (priLOSEC) 40 MG capsule, Take 40 mg by mouth Daily., Disp: , Rfl:   •  rosuvastatin (CRESTOR) 40 MG tablet, Take 1 tablet by mouth Every Night., Disp: 90 tablet, Rfl: 3  •  traZODone (DESYREL) 150 MG tablet, Take 150 mg by mouth Every Night., Disp: , Rfl:         History of Present Illness    She returns  "today for annual follow-up of coronary atherosclerotic vascular disease.  Since her last visit, she is being evaluated for severe lumbar spine pain and right leg pain which is been diagnosed as sciatica is associate with significant weakness and numbness as well.  As part of her MRI she was noted to have an incidental AAA.  She is asked to follow-up for further evaluation of this.  She does continue to still smoke.  She is not active due to severe right leg and back pain.  She is scheduled to see Dr. Torres in [place] for a possible surgical intervention.  He denies any chest pain or shortness of breath PND orthopnea.  Her right leg pain occurs at rest and with exertion is exacerbated by standing    The following portions of the patient's history were reviewed and updated as appropriate: allergies, current medications, past family history, past medical history, past social history, past surgical history and problem list.      Social History     Tobacco Use   • Smoking status: Current Every Day Smoker     Packs/day: 1.00     Years: 40.00     Pack years: 40.00     Types: Cigarettes   • Smokeless tobacco: Never Used   Substance Use Topics   • Alcohol use: No   • Drug use: No         Review of Systems   Constitution: Positive for malaise/fatigue.   Cardiovascular: Positive for palpitations.   Respiratory: Positive for shortness of breath.    Hematologic/Lymphatic: Negative for bleeding problem. Does not bruise/bleed easily.   Skin: Negative for rash.   Musculoskeletal: Positive for back pain and myalgias. Negative for muscle weakness.   Gastrointestinal: Negative for heartburn, nausea and vomiting.   Neurological: Positive for difficulty with concentration and tremors.          Objective:    height is 172.7 cm (68\") and weight is 69.9 kg (154 lb). Her blood pressure is 150/68 and her pulse is 54. Her oxygen saturation is 96%.         Physical Exam   Constitutional: She is oriented to person, place, and time. She " appears well-developed and well-nourished.   HENT:   Mouth/Throat: Oropharynx is clear and moist.   Neck: No JVD present. Carotid bruit is not present. No thyromegaly present.   Cardiovascular: Regular rhythm, S1 normal, S2 normal, normal heart sounds and intact distal pulses. Exam reveals no gallop, no S3 and no S4.   No murmur heard.  Pulses:       Carotid pulses are 2+ on the right side, and 2+ on the left side.       Radial pulses are 2+ on the right side, and 2+ on the left side.   Pulmonary/Chest: Breath sounds normal.   Abdominal: Soft. Bowel sounds are normal. She exhibits no mass. There is no tenderness.   Musculoskeletal: She exhibits no edema.   Neurological: She is alert and oriented to person, place, and time.   Skin: Skin is warm and dry. No rash noted.         ECG 12 Lead  Date/Time: 9/25/2019 3:54 PM  Performed by: Lico Nixon MD  Authorized by: Lico Nixon MD   Comparison: compared with previous ECG from 11/2/2017  Comparison to previous ECG: Nonspecific ST abnormality is more pronounced  Rhythm: sinus rhythm  Other findings: non-specific ST-T wave changes                  Assessment:   Assessment/Plan      Dalila was seen today for follow-up.    Diagnoses and all orders for this visit:    Coronary artery disease involving native coronary artery of native heart without angina pectoris    Bilateral carotid artery disease, unspecified type (CMS/HCC)    Essential hypertension    Tobacco abuse    Dyslipidemia    AAA (abdominal aortic aneurysm) without rupture (CMS/HCC)    Other orders  -     ECG 12 Lead      1.  Coronary artery disease, status post normal stress perfusion 2 years ago.  No change in cardiovascular symptoms, specifically no angina or change in dyspnea.  2.  AAA, new diagnosis.  Will undergo CTA of the abdomen pelvis to assess for size and involvement of renal arteries.  3.  Carotid artery disease, status post remote left carotid enterectomy.  We will recheck carotid duplex.  4.   Hypertension, controlled today on current medical therapy  5.  Ongoing tobacco abuse, counseled greater than 3 minutes cessation.  6.  Dyslipidemia on high-dose statin.  7.  Severe right leg and lower back symptoms consistent with lumbar spine disease/sciatica.  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  8.  If patient does require lumbar spine surgery, pending the CT of the abdomen, would be at low cardiovascular risk of surgery, and will proceed as planned without further cardiovascular testing    Lico Nixon MD             Dictated utilizing Dragon dictation

## 2019-10-25 ENCOUNTER — APPOINTMENT (OUTPATIENT)
Dept: CARDIOLOGY | Facility: HOSPITAL | Age: 69
End: 2019-10-25

## 2019-10-25 ENCOUNTER — APPOINTMENT (OUTPATIENT)
Dept: CT IMAGING | Facility: HOSPITAL | Age: 69
End: 2019-10-25

## 2019-12-02 ENCOUNTER — HOSPITAL ENCOUNTER (OUTPATIENT)
Dept: CT IMAGING | Facility: HOSPITAL | Age: 69
End: 2019-12-02

## 2019-12-02 ENCOUNTER — APPOINTMENT (OUTPATIENT)
Dept: CARDIOLOGY | Facility: HOSPITAL | Age: 69
End: 2019-12-02

## 2019-12-02 ENCOUNTER — HOSPITAL ENCOUNTER (OUTPATIENT)
Dept: CARDIOLOGY | Facility: HOSPITAL | Age: 69
End: 2019-12-02

## 2019-12-26 ENCOUNTER — HOSPITAL ENCOUNTER (OUTPATIENT)
Dept: CARDIOLOGY | Facility: HOSPITAL | Age: 69
Discharge: HOME | End: 2019-12-26
Admitting: INTERNAL MEDICINE

## 2019-12-26 ENCOUNTER — HOSPITAL ENCOUNTER (OUTPATIENT)
Dept: CARDIOLOGY | Facility: HOSPITAL | Age: 69
Discharge: HOME | End: 2019-12-26

## 2019-12-26 ENCOUNTER — HOSPITAL ENCOUNTER (OUTPATIENT)
Dept: CT IMAGING | Facility: HOSPITAL | Age: 69
Discharge: HOME | End: 2019-12-26

## 2019-12-26 VITALS — HEIGHT: 68 IN | BODY MASS INDEX: 23.34 KG/M2 | WEIGHT: 154 LBS

## 2019-12-26 DIAGNOSIS — I77.9 BILATERAL CAROTID ARTERY DISEASE, UNSPECIFIED TYPE (HCC): ICD-10-CM

## 2019-12-26 DIAGNOSIS — E78.5 DYSLIPIDEMIA: ICD-10-CM

## 2019-12-26 DIAGNOSIS — I77.1 ARTERY STENOSIS (HCC): ICD-10-CM

## 2019-12-26 DIAGNOSIS — Z72.0 TOBACCO ABUSE: ICD-10-CM

## 2019-12-26 DIAGNOSIS — Z48.812 ENCOUNTER FOR POSTOPERATIVE CAROTID ENDARTERECTOMY SURVEILLANCE: ICD-10-CM

## 2019-12-26 DIAGNOSIS — I25.10 CORONARY ARTERY DISEASE INVOLVING NATIVE CORONARY ARTERY OF NATIVE HEART WITHOUT ANGINA PECTORIS: ICD-10-CM

## 2019-12-26 DIAGNOSIS — I71.40 AAA (ABDOMINAL AORTIC ANEURYSM) WITHOUT RUPTURE (HCC): ICD-10-CM

## 2019-12-26 PROCEDURE — 93880 EXTRACRANIAL BILAT STUDY: CPT | Performed by: INTERNAL MEDICINE

## 2019-12-26 PROCEDURE — 82565 ASSAY OF CREATININE: CPT

## 2019-12-26 PROCEDURE — 93880 EXTRACRANIAL BILAT STUDY: CPT

## 2019-12-26 PROCEDURE — 93923 UPR/LXTR ART STDY 3+ LVLS: CPT

## 2019-12-26 PROCEDURE — 93923 UPR/LXTR ART STDY 3+ LVLS: CPT | Performed by: INTERNAL MEDICINE

## 2019-12-30 ENCOUNTER — HOSPITAL ENCOUNTER (OUTPATIENT)
Dept: CT IMAGING | Facility: HOSPITAL | Age: 69
End: 2019-12-30

## 2019-12-30 LAB
BH CV ECHO MEAS - BSA(HAYCOCK): 1.8 M^2
BH CV ECHO MEAS - BSA: 1.8 M^2
BH CV ECHO MEAS - BZI_BMI: 23.4 KILOGRAMS/M^2
BH CV ECHO MEAS - BZI_METRIC_HEIGHT: 172.7 CM
BH CV ECHO MEAS - BZI_METRIC_WEIGHT: 69.9 KG
BH CV LOWER ARTERIAL LEFT ABI RATIO: 0.97
BH CV LOWER ARTERIAL LEFT CALF RATIO: 0.99
BH CV LOWER ARTERIAL LEFT DORSALIS PEDIS SYS MAX: 151 MMHG
BH CV LOWER ARTERIAL LEFT GREAT TOE SYS MAX: 105 MMHG
BH CV LOWER ARTERIAL LEFT HIGH THIGH SYS MAX: 194 MMHG
BH CV LOWER ARTERIAL LEFT LOW THIGH SYS MAX: 147 MMHG
BH CV LOWER ARTERIAL LEFT POPLITEAL SYS MAX: 156 MMHG
BH CV LOWER ARTERIAL LEFT POST TIBIAL SYS MAX: 154 MMHG
BH CV LOWER ARTERIAL LEFT TBI RATIO: 0.66
BH CV LOWER ARTERIAL RIGHT ABI RATIO: 0.99
BH CV LOWER ARTERIAL RIGHT CALF RATIO: 0.96
BH CV LOWER ARTERIAL RIGHT DORSALIS PEDIS SYS MAX: 156 MMHG
BH CV LOWER ARTERIAL RIGHT GREAT TOE SYS MAX: 101 MMHG
BH CV LOWER ARTERIAL RIGHT HIGH THIGH SYS MAX: 132 MMHG
BH CV LOWER ARTERIAL RIGHT LOW THIGH SYS MAX: 142 MMHG
BH CV LOWER ARTERIAL RIGHT POPLITEAL SYS MAX: 151 MMHG
BH CV LOWER ARTERIAL RIGHT POST TIBIAL SYS MAX: 143 MMHG
BH CV LOWER ARTERIAL RIGHT TBI RATIO: 0.64
BH CV XLRA MEAS LEFT CCA RATIO VEL: 89.4 CM/SEC
BH CV XLRA MEAS LEFT DIST CCA EDV: 18.2 CM/SEC
BH CV XLRA MEAS LEFT DIST CCA PSV: 90.1 CM/SEC
BH CV XLRA MEAS LEFT DIST ICA EDV: 30.4 CM/SEC
BH CV XLRA MEAS LEFT DIST ICA PSV: 130.6 CM/SEC
BH CV XLRA MEAS LEFT ICA RATIO VEL: 162 CM/SEC
BH CV XLRA MEAS LEFT ICA/CCA RATIO: 1.8
BH CV XLRA MEAS LEFT MID CCA EDV: 19.6 CM/SEC
BH CV XLRA MEAS LEFT MID CCA PSV: 76.1 CM/SEC
BH CV XLRA MEAS LEFT MID ICA EDV: 37.3 CM/SEC
BH CV XLRA MEAS LEFT MID ICA PSV: 163 CM/SEC
BH CV XLRA MEAS LEFT PROX CCA EDV: 16.8 CM/SEC
BH CV XLRA MEAS LEFT PROX CCA PSV: 76.8 CM/SEC
BH CV XLRA MEAS LEFT PROX ECA PSV: 125.7 CM/SEC
BH CV XLRA MEAS LEFT PROX ICA EDV: 34.4 CM/SEC
BH CV XLRA MEAS LEFT PROX ICA PSV: 156.2 CM/SEC
BH CV XLRA MEAS LEFT PROX SCLA PSV: 199.4 CM/SEC
BH CV XLRA MEAS LEFT VERTEBRAL A EDV: 13.8 CM/SEC
BH CV XLRA MEAS LEFT VERTEBRAL A PSV: 57 CM/SEC
BH CV XLRA MEAS RIGHT CCA RATIO VEL: 68.3 CM/SEC
BH CV XLRA MEAS RIGHT DIST CCA EDV: 14.7 CM/SEC
BH CV XLRA MEAS RIGHT DIST CCA PSV: 68.8 CM/SEC
BH CV XLRA MEAS RIGHT DIST ICA EDV: 28.8 CM/SEC
BH CV XLRA MEAS RIGHT DIST ICA PSV: 137.1 CM/SEC
BH CV XLRA MEAS RIGHT ICA RATIO VEL: 292 CM/SEC
BH CV XLRA MEAS RIGHT ICA/CCA RATIO: 4.3
BH CV XLRA MEAS RIGHT MID CCA EDV: 16.2 CM/SEC
BH CV XLRA MEAS RIGHT MID CCA PSV: 73.2 CM/SEC
BH CV XLRA MEAS RIGHT MID ICA EDV: 75.4 CM/SEC
BH CV XLRA MEAS RIGHT MID ICA PSV: 293.9 CM/SEC
BH CV XLRA MEAS RIGHT PROX CCA EDV: 15.7 CM/SEC
BH CV XLRA MEAS RIGHT PROX CCA PSV: 88 CM/SEC
BH CV XLRA MEAS RIGHT PROX ECA PSV: 212.1 CM/SEC
BH CV XLRA MEAS RIGHT PROX ICA EDV: 18.7 CM/SEC
BH CV XLRA MEAS RIGHT PROX ICA PSV: 54 CM/SEC
BH CV XLRA MEAS RIGHT PROX SCLA PSV: 165 CM/SEC
BH CV XLRA MEAS RIGHT VERTEBRAL A EDV: 13.3 CM/SEC
BH CV XLRA MEAS RIGHT VERTEBRAL A PSV: 69.8 CM/SEC
CREAT BLDA-MCNC: 1.7 MG/DL (ref 0.6–1.3)
LEFT ARM BP: NORMAL MMHG
RIGHT ARM BP: NORMAL MMHG
UPPER ARTERIAL LEFT ARM BRACHIAL SYS MAX: 151 MMHG
UPPER ARTERIAL RIGHT ARM BRACHIAL SYS MAX: 158 MMHG

## 2020-01-24 ENCOUNTER — HOSPITAL ENCOUNTER (OUTPATIENT)
Dept: CT IMAGING | Facility: HOSPITAL | Age: 70
End: 2020-01-24

## 2020-01-27 ENCOUNTER — HOSPITAL ENCOUNTER (OUTPATIENT)
Dept: CT IMAGING | Facility: HOSPITAL | Age: 70
Discharge: HOME | End: 2020-01-27
Admitting: INTERNAL MEDICINE

## 2020-01-27 LAB — CREAT BLDA-MCNC: 1.8 MG/DL (ref 0.6–1.3)

## 2020-01-27 PROCEDURE — 82565 ASSAY OF CREATININE: CPT

## 2020-01-27 PROCEDURE — 0 IOPAMIDOL PER 1 ML: Performed by: INTERNAL MEDICINE

## 2020-01-27 PROCEDURE — 74174 CTA ABD&PLVS W/CONTRAST: CPT

## 2020-01-27 RX ADMIN — IOPAMIDOL 65 ML: 755 INJECTION, SOLUTION INTRAVENOUS at 14:46

## 2020-02-03 ENCOUNTER — TELEPHONE (OUTPATIENT)
Dept: CARDIOLOGY | Facility: CLINIC | Age: 70
End: 2020-02-03

## 2020-02-03 NOTE — TELEPHONE ENCOUNTER
Spoke with patient, advised of CT scan results, carotid results and LE doppler results.     ----- Message from Lico Nixon MD sent at 1/31/2020  4:53 PM EST -----  Patient has a very small dilatation of aorta (maximal 3.4 cm).  This does not require therapy at this time.  We will simply follow-up annually.  Could proceed for non-cardiovascular surgery at low risk.

## 2020-11-04 ENCOUNTER — OFFICE VISIT (OUTPATIENT)
Dept: CARDIOLOGY | Facility: CLINIC | Age: 70
End: 2020-11-04

## 2020-11-04 VITALS
OXYGEN SATURATION: 99 % | WEIGHT: 163 LBS | HEIGHT: 68 IN | SYSTOLIC BLOOD PRESSURE: 150 MMHG | DIASTOLIC BLOOD PRESSURE: 80 MMHG | HEART RATE: 58 BPM | BODY MASS INDEX: 24.71 KG/M2

## 2020-11-04 DIAGNOSIS — I65.23 BILATERAL CAROTID ARTERY STENOSIS: ICD-10-CM

## 2020-11-04 DIAGNOSIS — I25.10 CORONARY ARTERY DISEASE INVOLVING NATIVE CORONARY ARTERY OF NATIVE HEART WITHOUT ANGINA PECTORIS: Primary | ICD-10-CM

## 2020-11-04 DIAGNOSIS — I10 ESSENTIAL HYPERTENSION: ICD-10-CM

## 2020-11-04 DIAGNOSIS — E78.5 DYSLIPIDEMIA: ICD-10-CM

## 2020-11-04 PROCEDURE — 99214 OFFICE O/P EST MOD 30 MIN: CPT | Performed by: NURSE PRACTITIONER

## 2020-11-04 NOTE — PROGRESS NOTES
Subjective:     Encounter Date:11/04/2020    Primary Care Physician: Frannie Couch MD      Patient ID: Dalila Velez is a 70 y.o. female.    Chief Complaint:Follow-up    PROBLEM LIST:  1. Coronary artery disease:  a. History of catheter-based interventions; incomplete database.   b. Normal Regadenoson myocardial perfusion study, January 2015And 2/17  2. Renal artery stenosis:  a. Status post bilateral renal artery stenting by Dr. Landis in 2012. Right renal artery stented with a 4 mm stent. Left renal was treated with a 5.0 x 15 mm bare-metal stent.   3. Carotid disease:  a. Left carotid endarterectomy by Dr. Ramirez, 2006.   b. 11/17.  MRA 70% left ICA, 30% right ICA.  4. AAA  a. 3.4 cm 1/2020 by CTA  5. Essential Hypertension.  6. Dyslipidemia.  7. Ongoing tobacco abuse.  8. Peptic ulcer disease.  9. GERD.  10. Hypothyroidism with nodule.  11. Cholecystectomy.   12. Anxiety/depression.  13. Left hip replacement 2017  14. Back surgery        Allergies   Allergen Reactions   • Codeine Nausea And Vomiting         Current Outpatient Medications:   •  aspirin  MG tablet, Take 1 tablet by mouth Daily., Disp: 30 tablet, Rfl: 0  •  bisoprolol (ZEBeta) 5 MG tablet, Take 5 mg by mouth Daily  , Disp: , Rfl:   •  diazePAM (VALIUM) 5 MG tablet, Take 5 mg by mouth Daily As Needed for anxiety., Disp: , Rfl:   •  levothyroxine (SYNTHROID, LEVOTHROID) 100 MCG tablet, Take 100 mcg by mouth Daily., Disp: , Rfl:   •  NIFEdipine CC (ADALAT CC) 30 MG 24 hr tablet, Take 30 mg by mouth Daily., Disp: , Rfl:   •  nitroglycerin (NITROSTAT) 0.4 MG SL tablet, 1 under the tongue as needed for angina, may repeat q5mins for up three doses, Disp: 100 tablet, Rfl: 3  •  omeprazole (priLOSEC) 40 MG capsule, Take 40 mg by mouth Daily., Disp: , Rfl:   •  rosuvastatin (CRESTOR) 40 MG tablet, Take 1 tablet by mouth Every Night., Disp: 90 tablet, Rfl: 3  •  traZODone (DESYREL) 150 MG tablet, Take 150 mg by mouth Every Night., Disp: , Rfl:          History of Present Illness    Patient is a 70-year-old  female who is being seen today for annual follow-up of coronary artery disease, carotid artery disease, hypertension, and dyslipidemia.  Patient notes within the last few months she has not been very active secondary to the COVID-19 pandemic.  Her daughters do her grocery shopping for her.  She typically does not very often leave her house.  Spends much of her time watching TV.  She denies any chest pain, pressure, tightness.  Denies any increase shortness of breath.  Does note that she has gained weight over the course of the last few months.  No syncope, near syncope.  Does note some occasional lower extremity edema with long periods of standing that is typically alleviated with elevation of her extremities.  Her blood pressure at home is typically in the 130 over 70s range.  She has labs with her primary care physician which were recently done.  She continues to smoke and overall at this time does not have any interest in quitting.    The following portions of the patient's history were reviewed and updated as appropriate: allergies, current medications, past family history, past medical history, past social history, past surgical history and problem list.      Social History     Tobacco Use   • Smoking status: Current Every Day Smoker     Packs/day: 1.00     Years: 40.00     Pack years: 40.00     Types: Cigarettes   • Smokeless tobacco: Never Used   Substance Use Topics   • Alcohol use: No   • Drug use: No         Review of Systems   Constitution: Positive for malaise/fatigue.   Cardiovascular: Negative for chest pain, dyspnea on exertion, leg swelling, palpitations and syncope.   Respiratory: Positive for shortness of breath.    Endocrine: Positive for cold intolerance.   Hematologic/Lymphatic: Negative for bleeding problem. Does not bruise/bleed easily.   Skin: Positive for dry skin and itching. Negative for rash.   Musculoskeletal:  "Positive for arthritis and back pain. Negative for muscle weakness and myalgias.   Gastrointestinal: Negative for heartburn, nausea and vomiting.   Neurological: Positive for difficulty with concentration. Negative for dizziness, light-headedness, loss of balance and numbness.   Psychiatric/Behavioral: The patient is nervous/anxious.           Objective:   /80 (BP Location: Left arm)   Pulse 58   Ht 172.7 cm (68\")   Wt 73.9 kg (163 lb)   SpO2 99%   BMI 24.78 kg/m²         Vitals signs reviewed.   Constitutional:       Appearance: Healthy appearance. Well-developed and not in distress.   Neck:      Vascular: No JVD.      Trachea: No tracheal deviation.   Pulmonary:      Effort: Pulmonary effort is normal.      Breath sounds: Normal breath sounds.   Cardiovascular:      Normal rate. Regular rhythm.   Pulses:     Intact distal pulses.   Edema:     Peripheral edema absent.   Abdominal:      General: Bowel sounds are normal.      Palpations: Abdomen is soft.      Tenderness: There is no abdominal tenderness.   Musculoskeletal:         General: No deformity.   Skin:     General: Skin is warm and dry.   Neurological:      Mental Status: Alert and oriented to person, place, and time.         Procedures          Assessment:   Assessment/Plan      Diagnoses and all orders for this visit:    1. Coronary artery disease involving native coronary artery of native heart without angina pectoris (Primary).  Stable.  On aspirin.    2. Bilateral carotid artery stenosis, stable by carotid duplex last year.  On statin therapy.    3. Essential hypertension, controlled on nifedipine and beta-blocker.    4. Dyslipidemia, labs followed by primary care.      Plan:  1. Continue current cardiac medications.  2. We will attempt to obtain most recent lipid panel from primary care physician to assess statin efficacy.  3. Discussed aortic aneurysm diagnosis with Dr. Nxion.  At this time he would not recommend repeat evaluation for " roughly 3 years.  4. Follow-up in 1 years time with repeat carotid duplex or sooner if needed.       Ursula MAJOR     Dictated utilizing Dragon dictation

## 2021-07-02 DIAGNOSIS — I65.23 CAROTID ARTERY STENOSIS, ASYMPTOMATIC, BILATERAL: ICD-10-CM

## 2021-07-02 DIAGNOSIS — I10 ESSENTIAL HYPERTENSION: ICD-10-CM

## 2021-07-02 DIAGNOSIS — E78.5 DYSLIPIDEMIA: ICD-10-CM

## 2021-07-02 DIAGNOSIS — I77.9 BILATERAL CAROTID ARTERY DISEASE, UNSPECIFIED TYPE (HCC): ICD-10-CM

## 2021-07-02 DIAGNOSIS — I25.10 CORONARY ARTERY DISEASE INVOLVING NATIVE CORONARY ARTERY OF NATIVE HEART, ANGINA PRESENCE UNSPECIFIED: Primary | ICD-10-CM

## 2021-11-11 ENCOUNTER — HOSPITAL ENCOUNTER (OUTPATIENT)
Dept: CARDIOLOGY | Facility: HOSPITAL | Age: 71
End: 2021-11-11

## 2021-11-17 ENCOUNTER — OFFICE VISIT (OUTPATIENT)
Dept: CARDIOLOGY | Facility: CLINIC | Age: 71
End: 2021-11-17

## 2021-11-17 VITALS
SYSTOLIC BLOOD PRESSURE: 132 MMHG | OXYGEN SATURATION: 97 % | WEIGHT: 149 LBS | HEIGHT: 68 IN | BODY MASS INDEX: 22.58 KG/M2 | DIASTOLIC BLOOD PRESSURE: 64 MMHG | HEART RATE: 58 BPM

## 2021-11-17 DIAGNOSIS — E78.5 DYSLIPIDEMIA: ICD-10-CM

## 2021-11-17 DIAGNOSIS — I10 ESSENTIAL HYPERTENSION: ICD-10-CM

## 2021-11-17 DIAGNOSIS — M24.849 THUMB JOINT LOCKING: ICD-10-CM

## 2021-11-17 DIAGNOSIS — I65.23 BILATERAL CAROTID ARTERY STENOSIS: ICD-10-CM

## 2021-11-17 DIAGNOSIS — I25.10 CORONARY ARTERY DISEASE INVOLVING NATIVE CORONARY ARTERY OF NATIVE HEART WITHOUT ANGINA PECTORIS: Primary | ICD-10-CM

## 2021-11-17 PROCEDURE — 99213 OFFICE O/P EST LOW 20 MIN: CPT | Performed by: INTERNAL MEDICINE

## 2021-11-17 NOTE — PROGRESS NOTES
Subjective:     Encounter Date:11/17/2021    Primary Care Physician: Frannie Couch MD      Patient ID: Dalila Velez is a 71 y.o. female.    Chief Complaint:Follow-up    PROBLEM LIST:  1. Coronary artery disease:  a. History of catheter-based interventions; incomplete database.   b. Normal Regadenoson myocardial perfusion study, January 2015And 2/17  2. Renal artery stenosis:  a. Status post bilateral renal artery stenting by Dr. Landis in 2012. Right renal artery stented with a 4 mm stent. Left renal was treated with a 5.0 x 15 mm bare-metal stent.   3. Carotid disease:  a. Left carotid endarterectomy by Dr. Ramirez, 2006.   b. 11/17.  MRA 70% left ICA, 30% right ICA.  4. AAA  a. 3.4 cm 1/2020 by CTA  5. Essential Hypertension.  6. Dyslipidemia.  7. Ongoing tobacco abuse.  8. Peptic ulcer disease.  9. GERD.  10. Hypothyroidism with nodule.  11. Cholecystectomy.   12. Anxiety/depression.  13. Left hip replacement 2017  14. Back surgery        Allergies   Allergen Reactions   • Codeine Nausea And Vomiting         Current Outpatient Medications:   •  aspirin  MG tablet, Take 1 tablet by mouth Daily., Disp: 30 tablet, Rfl: 0  •  bisoprolol (ZEBeta) 5 MG tablet, Take 5 mg by mouth Daily  , Disp: , Rfl:   •  diazePAM (VALIUM) 5 MG tablet, Take 5 mg by mouth Daily As Needed for anxiety., Disp: , Rfl:   •  levothyroxine (SYNTHROID, LEVOTHROID) 100 MCG tablet, Take 100 mcg by mouth Daily., Disp: , Rfl:   •  NIFEdipine CC (ADALAT CC) 30 MG 24 hr tablet, Take 30 mg by mouth Daily., Disp: , Rfl:   •  nitroglycerin (NITROSTAT) 0.4 MG SL tablet, 1 under the tongue as needed for angina, may repeat q5mins for up three doses, Disp: 100 tablet, Rfl: 3  •  omeprazole (priLOSEC) 40 MG capsule, Take 40 mg by mouth Daily., Disp: , Rfl:   •  rosuvastatin (CRESTOR) 40 MG tablet, Take 1 tablet by mouth Every Night., Disp: 90 tablet, Rfl: 3  •  traZODone (DESYREL) 150 MG tablet, Take 150 mg by mouth Every Night., Disp: , Rfl:  "        History of Present Illness    Patient is a 71-year-old  female who presents today for annual follow-up. Since last being seen she notes to overall be doing well from cardiac standpoint. Patient denies any chest pain, pressure, tightness. Denies any increase shortness of breath. No syncope, near-syncope, or edema. She has been having some issues with her left hand and is unable to bend her left thumb. She is scheduled for follow-up of her carotid artery on Friday.    The following portions of the patient's history were reviewed and updated as appropriate: allergies, current medications, past family history, past medical history, past social history, past surgical history and problem list.      Social History     Tobacco Use   • Smoking status: Current Every Day Smoker     Packs/day: 1.00     Years: 40.00     Pack years: 40.00     Types: Cigarettes   • Smokeless tobacco: Never Used   Substance Use Topics   • Alcohol use: No   • Drug use: No         Review of Systems   Constitutional: Positive for malaise/fatigue.   Cardiovascular: Negative for chest pain, dyspnea on exertion, leg swelling, palpitations and syncope.   Respiratory: Negative.  Negative for shortness of breath.    Hematologic/Lymphatic: Negative for bleeding problem. Does not bruise/bleed easily.   Skin: Negative for rash.   Musculoskeletal: Positive for arthritis and joint pain. Negative for muscle weakness and myalgias.   Gastrointestinal: Negative for heartburn, nausea and vomiting.   Neurological: Negative for dizziness, light-headedness, loss of balance and numbness.          Objective:   /64   Pulse 58   Ht 172.7 cm (68\")   Wt 67.6 kg (149 lb)   SpO2 97%   BMI 22.66 kg/m²         Vitals reviewed.   Constitutional:       Appearance: Healthy appearance. Well-developed and not in distress.   Neck:      Vascular: No JVD.      Trachea: No tracheal deviation.   Pulmonary:      Effort: Pulmonary effort is normal.      Breath " sounds: Normal breath sounds.   Cardiovascular:      Normal rate. Regular rhythm.   Pulses:     Carotid: on the right side with bruit.  Abdominal:      General: Bowel sounds are normal.      Palpations: Abdomen is soft.      Tenderness: There is no abdominal tenderness.   Musculoskeletal:         General: No deformity.      Comments: Left thumb DIP, fixed.  Unable to flex, even passively.  Nodule noted on ventral surface Skin:     General: Skin is warm and dry.   Neurological:      Mental Status: Alert and oriented to person, place, and time.         Procedures          Assessment:   Assessment/Plan      Diagnoses and all orders for this visit:    1. Coronary artery disease involving native coronary artery of native heart without angina pectoris (Primary)    2. Bilateral carotid artery stenosis    3. Essential hypertension    4. Dyslipidemia      Assessment:  1. Coronary artery disease, stable without angina. On aspirin.  2. Carotid artery stenosis. No evaluation in years. Scheduled for duplex on Friday.  3. Hypertension, stable. On nifedipine and bisoprolol.  4. Dyslipidemia, on high intensity statin. Labs with primary care.    Plan:  1. Continue current cardiac medications.  2. Obtain carotid duplex Friday as scheduled.  3. Will refer patient to hand specialist given her current inability to bend her left thumb.  4. Follow-up in 1 year's time with myocardial perfusion study prior to visit.       MORIAH Santos scribed this dictation for Dr. Lico Nixon.   I have seen and examined the patient, I have reviewed the note, discussed the case with the advance practice clinician, made necessary changes and I agree with the final note.    Lico Nixon MD  11/17/21  15:28 EST        Dictated utilizing Dragon dictation

## 2021-11-18 DIAGNOSIS — M24.849 THUMB JOINT LOCKING: Primary | ICD-10-CM

## 2022-11-09 ENCOUNTER — OUTSIDE FACILITY SERVICE (OUTPATIENT)
Dept: CARDIOLOGY | Facility: CLINIC | Age: 72
End: 2022-11-09

## 2022-11-09 PROCEDURE — 93018 CV STRESS TEST I&R ONLY: CPT | Performed by: INTERNAL MEDICINE

## 2022-11-09 PROCEDURE — 93016 CV STRESS TEST SUPVJ ONLY: CPT | Performed by: NURSE PRACTITIONER

## 2022-11-09 PROCEDURE — 78452 HT MUSCLE IMAGE SPECT MULT: CPT | Performed by: INTERNAL MEDICINE

## 2022-11-10 ENCOUNTER — TELEPHONE (OUTPATIENT)
Dept: CARDIOLOGY | Facility: CLINIC | Age: 72
End: 2022-11-10

## 2022-11-10 NOTE — TELEPHONE ENCOUNTER
Spoke with patient, advised of normal stress test results reviewed by Dr. Nixon at [facility] on 11/10/22

## 2022-11-22 NOTE — PROGRESS NOTES
Subjective:     Encounter Date:11/23/2022    Primary Care Physician: Frannie Couch MD      Patient ID: Dalila Velez is a 72 y.o. female.    Chief Complaint:Follow-up    PROBLEM LIST:  1. Coronary artery disease:  a. History of catheter-based interventions; incomplete database.   b. Normal Regadenoson myocardial perfusion study, January 2015And 2/17  c. 11/2022 normal MPS  2. Renal artery stenosis:  a. Status post bilateral renal artery stenting by Dr. Landis in 2012. Right renal artery stented with a 4 mm stent. Left renal was treated with a 5.0 x 15 mm bare-metal stent.   3. Carotid disease:  a. Left carotid endarterectomy by Dr. Ramirez, 2006.   b. 11/17.  MRA 70% left ICA, 30% right ICA.  c. 2019 carotid duplex 50 to 69% left ICA, less than 50% right.  4. AAA  a. 3.4 cm 1/2020 by CTA  5. Essential Hypertension.  6. Dyslipidemia.  7. Ongoing tobacco abuse.  8. Peptic ulcer disease.  9. GERD.  10. Hypothyroidism with nodule.  11. Cholecystectomy.   12. Anxiety/depression.  13. Left hip replacement 2017  14. Back surgery      Allergies   Allergen Reactions   • Codeine Nausea And Vomiting         Current Outpatient Medications:   •  aspirin  MG tablet, Take 1 tablet by mouth Daily., Disp: 30 tablet, Rfl: 0  •  bisoprolol (ZEBeta) 5 MG tablet, Take 5 mg by mouth Daily  , Disp: , Rfl:   •  cholecalciferol (VITAMIN D3) 1.25 MG (97593 UT) capsule, Take 50,000 Units by mouth Daily., Disp: , Rfl:   •  diazePAM (VALIUM) 5 MG tablet, Take 5 mg by mouth Daily As Needed for anxiety., Disp: , Rfl:   •  levothyroxine (SYNTHROID, LEVOTHROID) 100 MCG tablet, Take 100 mcg by mouth Daily., Disp: , Rfl:   •  NIFEdipine CC (ADALAT CC) 30 MG 24 hr tablet, Take 30 mg by mouth Daily., Disp: , Rfl:   •  omeprazole (priLOSEC) 40 MG capsule, Take 40 mg by mouth Daily., Disp: , Rfl:   •  rosuvastatin (CRESTOR) 40 MG tablet, Take 1 tablet by mouth Every Night., Disp: 90 tablet, Rfl: 3  •  traZODone (DESYREL) 150 MG tablet, Take 150  "mg by mouth Every Night., Disp: , Rfl:   •  nitroglycerin (NITROSTAT) 0.4 MG SL tablet, 1 under the tongue as needed for angina, may repeat q5mins for up three doses, Disp: 100 tablet, Rfl: 3        History of Present Illness    Patient returns today for annual follow-up of atherosclerotic vascular disease and risk factors.  Since her last visit, she notes continued shortness of breath, functional events to this 3 level.  She continues to smoke 1/2 packs/cigarettes daily.  Denies any chest pain.  Denies orthopnea PND peripheral edema tachypalpitations or dizziness.  No cough or wheezing.  Still having difficulty with her left thumb and inability to close it due to orthopedic limitations.  Has not seen hand surgeon as we suggested last year.    The following portions of the patient's history were reviewed and updated as appropriate: allergies, current medications, past family history, past medical history, past social history, past surgical history and problem list.      Social History     Tobacco Use   • Smoking status: Every Day     Packs/day: 1.00     Years: 40.00     Pack years: 40.00     Types: Cigarettes   • Smokeless tobacco: Never   Substance Use Topics   • Alcohol use: No   • Drug use: No         ROS       Objective:   /68   Pulse 60   Ht 167.6 cm (66\")   Wt 68.4 kg (150 lb 11.2 oz)   SpO2 96%   BMI 24.32 kg/m²         Vitals reviewed.   Constitutional:       Appearance: Healthy appearance. Well-developed and not in distress.   Neck:      Vascular: No JVD.      Trachea: No tracheal deviation.   Pulmonary:      Effort: Pulmonary effort is normal.      Breath sounds: Normal breath sounds.   Cardiovascular:      Normal rate. Regular rhythm.   Pulses:     Carotid: on the right side with bruit.  Abdominal:      General: Bowel sounds are normal.      Palpations: Abdomen is soft.      Tenderness: There is no abdominal tenderness.   Musculoskeletal:         General: No deformity.      Comments: Left thumb " DIP, fixed.  Unable to flex, even passively.  Nodule noted on ventral surface Skin:     General: Skin is warm and dry.   Neurological:      Mental Status: Alert and oriented to person, place, and time.         Procedures          Assessment:   Assessment & Plan      Diagnoses and all orders for this visit:    1. Coronary artery disease involving native coronary artery of native heart without angina pectoris (Primary)    2. Bilateral carotid artery disease, unspecified type (HCC)    3. Primary hypertension    4. Dyslipidemia    5. Abdominal aortic aneurysm (AAA) without rupture, unspecified part      Coronary artery disease, history of remote CBI.  Normal stress perfusion study 2 weeks ago.  2.  Carotid artery disease.  50 to 69% left ICA by last carotid duplex 3 years ago.  Has not had follow-up appointments For these repeat duplex.  3.  History of renal artery stenosis.  Currently blood pressure well controlled  4.  Dyslipidemia on high intensity statin  5.  Ongoing tobacco abuse  6.  Dyspnea on exertion suspect pulmonary disease given normal perfusion study  7.  Left hand orthopedic issues.    Recommendations:  1.  Check pulmonary function test  2.  Referral to hand specialist to evaluate left thumb issues  3.  Continue current medical therapy  4.  Check carotid duplex  5.  Revisit annually apparent symptom change    Lico Nixon MD              Dictated utilizing Dragon dictation

## 2022-11-23 ENCOUNTER — OFFICE VISIT (OUTPATIENT)
Dept: CARDIOLOGY | Facility: CLINIC | Age: 72
End: 2022-11-23

## 2022-11-23 VITALS
DIASTOLIC BLOOD PRESSURE: 68 MMHG | OXYGEN SATURATION: 96 % | WEIGHT: 150.7 LBS | BODY MASS INDEX: 24.22 KG/M2 | HEIGHT: 66 IN | SYSTOLIC BLOOD PRESSURE: 126 MMHG | HEART RATE: 60 BPM

## 2022-11-23 DIAGNOSIS — I65.23 BILATERAL CAROTID ARTERY STENOSIS: ICD-10-CM

## 2022-11-23 DIAGNOSIS — E78.5 DYSLIPIDEMIA: ICD-10-CM

## 2022-11-23 DIAGNOSIS — I65.23 CAROTID ARTERY STENOSIS, ASYMPTOMATIC, BILATERAL: ICD-10-CM

## 2022-11-23 DIAGNOSIS — I25.10 CORONARY ARTERY DISEASE INVOLVING NATIVE CORONARY ARTERY OF NATIVE HEART WITHOUT ANGINA PECTORIS: Primary | ICD-10-CM

## 2022-11-23 DIAGNOSIS — M24.849 THUMB JOINT LOCKING: ICD-10-CM

## 2022-11-23 DIAGNOSIS — I71.40 ABDOMINAL AORTIC ANEURYSM (AAA) WITHOUT RUPTURE, UNSPECIFIED PART: ICD-10-CM

## 2022-11-23 DIAGNOSIS — I10 ESSENTIAL HYPERTENSION: ICD-10-CM

## 2022-11-23 DIAGNOSIS — I10 PRIMARY HYPERTENSION: ICD-10-CM

## 2022-11-23 DIAGNOSIS — I77.9 BILATERAL CAROTID ARTERY DISEASE, UNSPECIFIED TYPE: ICD-10-CM

## 2022-11-23 DIAGNOSIS — I77.9 BILATERAL CAROTID ARTERY DISEASE, UNSPECIFIED TYPE: Primary | ICD-10-CM

## 2022-11-23 PROCEDURE — 99214 OFFICE O/P EST MOD 30 MIN: CPT | Performed by: INTERNAL MEDICINE

## 2022-12-27 ENCOUNTER — APPOINTMENT (OUTPATIENT)
Dept: PULMONOLOGY | Facility: HOSPITAL | Age: 72
End: 2022-12-27

## 2022-12-27 ENCOUNTER — HOSPITAL ENCOUNTER (OUTPATIENT)
Dept: CARDIOLOGY | Facility: HOSPITAL | Age: 72
End: 2022-12-27

## 2023-01-04 ENCOUNTER — HOSPITAL ENCOUNTER (OUTPATIENT)
Dept: CARDIOLOGY | Facility: HOSPITAL | Age: 73
Discharge: HOME | End: 2023-01-04
Payer: MEDICARE

## 2023-01-04 ENCOUNTER — HOSPITAL ENCOUNTER (OUTPATIENT)
Dept: PULMONOLOGY | Facility: HOSPITAL | Age: 73
Discharge: HOME | End: 2023-01-04
Payer: MEDICARE

## 2023-01-04 VITALS — HEIGHT: 66 IN | BODY MASS INDEX: 24.11 KG/M2 | WEIGHT: 150 LBS

## 2023-01-04 DIAGNOSIS — I10 ESSENTIAL HYPERTENSION: ICD-10-CM

## 2023-01-04 DIAGNOSIS — I65.23 BILATERAL CAROTID ARTERY STENOSIS: ICD-10-CM

## 2023-01-04 DIAGNOSIS — M24.849 THUMB JOINT LOCKING: ICD-10-CM

## 2023-01-04 DIAGNOSIS — I65.23 CAROTID ARTERY STENOSIS, ASYMPTOMATIC, BILATERAL: ICD-10-CM

## 2023-01-04 DIAGNOSIS — I10 PRIMARY HYPERTENSION: ICD-10-CM

## 2023-01-04 DIAGNOSIS — I77.9 BILATERAL CAROTID ARTERY DISEASE, UNSPECIFIED TYPE: ICD-10-CM

## 2023-01-04 PROCEDURE — 94727 GAS DIL/WSHOT DETER LNG VOL: CPT | Performed by: INTERNAL MEDICINE

## 2023-01-04 PROCEDURE — 94010 BREATHING CAPACITY TEST: CPT | Performed by: INTERNAL MEDICINE

## 2023-01-04 PROCEDURE — 93880 EXTRACRANIAL BILAT STUDY: CPT | Performed by: INTERNAL MEDICINE

## 2023-01-04 PROCEDURE — 94010 BREATHING CAPACITY TEST: CPT

## 2023-01-04 PROCEDURE — 93880 EXTRACRANIAL BILAT STUDY: CPT

## 2023-01-04 PROCEDURE — 94727 GAS DIL/WSHOT DETER LNG VOL: CPT

## 2023-01-04 PROCEDURE — 94729 DIFFUSING CAPACITY: CPT | Performed by: INTERNAL MEDICINE

## 2023-01-04 PROCEDURE — 94729 DIFFUSING CAPACITY: CPT

## 2023-01-05 LAB
BH CV XLRA MEAS LEFT DIST CCA EDV: 23 CM/SEC
BH CV XLRA MEAS LEFT DIST CCA PSV: 93.8 CM/SEC
BH CV XLRA MEAS LEFT DIST ICA EDV: 27.4 CM/SEC
BH CV XLRA MEAS LEFT DIST ICA PSV: 105 CM/SEC
BH CV XLRA MEAS LEFT ICA/CCA RATIO: 2.1
BH CV XLRA MEAS LEFT MID CCA EDV: 19.9 CM/SEC
BH CV XLRA MEAS LEFT MID CCA PSV: 78.3 CM/SEC
BH CV XLRA MEAS LEFT MID ICA EDV: 36.6 CM/SEC
BH CV XLRA MEAS LEFT MID ICA PSV: 154 CM/SEC
BH CV XLRA MEAS LEFT PROX CCA EDV: 19.9 CM/SEC
BH CV XLRA MEAS LEFT PROX CCA PSV: 65.9 CM/SEC
BH CV XLRA MEAS LEFT PROX ECA PSV: 128 CM/SEC
BH CV XLRA MEAS LEFT PROX ICA EDV: 58.5 CM/SEC
BH CV XLRA MEAS LEFT PROX ICA PSV: 196.3 CM/SEC
BH CV XLRA MEAS LEFT PROX SCLA PSV: 137.1 CM/SEC
BH CV XLRA MEAS LEFT VERTEBRAL A EDV: 17 CM/SEC
BH CV XLRA MEAS LEFT VERTEBRAL A PSV: 54.3 CM/SEC
BH CV XLRA MEAS RIGHT DIST CCA EDV: 16.5 CM/SEC
BH CV XLRA MEAS RIGHT DIST CCA PSV: 62.6 CM/SEC
BH CV XLRA MEAS RIGHT DIST ICA EDV: 28 CM/SEC
BH CV XLRA MEAS RIGHT DIST ICA PSV: 138 CM/SEC
BH CV XLRA MEAS RIGHT ICA/CCA RATIO: 1.6
BH CV XLRA MEAS RIGHT MID CCA EDV: 12.1 CM/SEC
BH CV XLRA MEAS RIGHT MID CCA PSV: 57.1 CM/SEC
BH CV XLRA MEAS RIGHT MID ICA EDV: 42.7 CM/SEC
BH CV XLRA MEAS RIGHT MID ICA PSV: 204.9 CM/SEC
BH CV XLRA MEAS RIGHT PROX CCA EDV: 19.6 CM/SEC
BH CV XLRA MEAS RIGHT PROX CCA PSV: 78.5 CM/SEC
BH CV XLRA MEAS RIGHT PROX ECA PSV: 152 CM/SEC
BH CV XLRA MEAS RIGHT PROX ICA EDV: 28.5 CM/SEC
BH CV XLRA MEAS RIGHT PROX ICA PSV: 91.6 CM/SEC
BH CV XLRA MEAS RIGHT PROX SCLA PSV: 141.4 CM/SEC
BH CV XLRA MEAS RIGHT VERTEBRAL A EDV: 14.7 CM/SEC
BH CV XLRA MEAS RIGHT VERTEBRAL A PSV: 91.8 CM/SEC
MAXIMAL PREDICTED HEART RATE: 149 BPM
STRESS TARGET HR: 127 BPM

## 2023-01-06 ENCOUNTER — TELEPHONE (OUTPATIENT)
Dept: CARDIOLOGY | Facility: CLINIC | Age: 73
End: 2023-01-06
Payer: MEDICARE

## 2023-01-06 NOTE — TELEPHONE ENCOUNTER
Regarding PFT-   Mild COPD, likely the cause of her dyspnea.  Discussed with her primary physician

## 2023-01-06 NOTE — TELEPHONE ENCOUNTER
----- Message from Lico Nixon MD sent at 1/5/2023  6:40 PM EST -----  Only minimal change in her carotid disease since last visit.  We will recheck next year.  Nothing to do at this time.

## 2024-01-01 ENCOUNTER — APPOINTMENT (OUTPATIENT)
Dept: GENERAL RADIOLOGY | Facility: HOSPITAL | Age: 74
DRG: 268 | End: 2024-01-01
Payer: MEDICARE

## 2024-01-01 ENCOUNTER — APPOINTMENT (OUTPATIENT)
Dept: CT IMAGING | Facility: HOSPITAL | Age: 74
DRG: 268 | End: 2024-01-01
Payer: MEDICARE

## 2024-01-01 ENCOUNTER — APPOINTMENT (OUTPATIENT)
Dept: NEPHROLOGY | Facility: HOSPITAL | Age: 74
DRG: 268 | End: 2024-01-01
Payer: MEDICARE

## 2024-01-01 ENCOUNTER — HOSPITAL ENCOUNTER (INPATIENT)
Facility: HOSPITAL | Age: 74
LOS: 10 days | DRG: 268 | End: 2024-06-15
Attending: SURGERY | Admitting: SURGERY
Payer: MEDICARE

## 2024-01-01 ENCOUNTER — APPOINTMENT (OUTPATIENT)
Dept: CARDIOLOGY | Facility: HOSPITAL | Age: 74
DRG: 268 | End: 2024-01-01
Payer: MEDICARE

## 2024-01-01 VITALS
DIASTOLIC BLOOD PRESSURE: 50 MMHG | RESPIRATION RATE: 22 BRPM | WEIGHT: 167.99 LBS | HEIGHT: 66 IN | SYSTOLIC BLOOD PRESSURE: 101 MMHG | TEMPERATURE: 98.3 F | BODY MASS INDEX: 27 KG/M2 | OXYGEN SATURATION: 50 %

## 2024-01-01 DIAGNOSIS — D62 ACUTE BLOOD LOSS ANEMIA: Primary | ICD-10-CM

## 2024-01-01 DIAGNOSIS — N17.9 ACUTE KIDNEY INJURY: ICD-10-CM

## 2024-01-01 LAB
ABO GROUP BLD: NORMAL
ACT BLD: 217 SECONDS (ref 82–152)
ALBUMIN SERPL-MCNC: 2.7 G/DL (ref 3.5–5.2)
ALBUMIN SERPL-MCNC: 2.8 G/DL (ref 3.5–5.2)
ALBUMIN SERPL-MCNC: 2.8 G/DL (ref 3.5–5.2)
ALBUMIN SERPL-MCNC: 2.9 G/DL (ref 3.5–5.2)
ALBUMIN SERPL-MCNC: 2.9 G/DL (ref 3.5–5.2)
ALBUMIN SERPL-MCNC: 3 G/DL (ref 3.5–5.2)
ALBUMIN SERPL-MCNC: 3.1 G/DL (ref 3.5–5.2)
ALBUMIN SERPL-MCNC: 3.3 G/DL (ref 3.5–5.2)
ALBUMIN SERPL-MCNC: 3.5 G/DL (ref 3.5–5.2)
ALBUMIN SERPL-MCNC: 3.6 G/DL (ref 3.5–5.2)
ALBUMIN/GLOB SERPL: 0.9 G/DL
ALBUMIN/GLOB SERPL: 0.9 G/DL
ALBUMIN/GLOB SERPL: 1.8 G/DL
ALP SERPL-CCNC: 137 U/L (ref 39–117)
ALP SERPL-CCNC: 150 U/L (ref 39–117)
ALP SERPL-CCNC: 163 U/L (ref 39–117)
ALT SERPL W P-5'-P-CCNC: <5 U/L (ref 1–33)
ANION GAP SERPL CALCULATED.3IONS-SCNC: 10 MMOL/L (ref 5–15)
ANION GAP SERPL CALCULATED.3IONS-SCNC: 11 MMOL/L (ref 5–15)
ANION GAP SERPL CALCULATED.3IONS-SCNC: 12 MMOL/L (ref 5–15)
ANION GAP SERPL CALCULATED.3IONS-SCNC: 13 MMOL/L (ref 5–15)
ANION GAP SERPL CALCULATED.3IONS-SCNC: 14 MMOL/L (ref 5–15)
ANION GAP SERPL CALCULATED.3IONS-SCNC: 15 MMOL/L (ref 5–15)
ANION GAP SERPL CALCULATED.3IONS-SCNC: 16 MMOL/L (ref 5–15)
ANION GAP SERPL CALCULATED.3IONS-SCNC: 17 MMOL/L (ref 5–15)
ANION GAP SERPL CALCULATED.3IONS-SCNC: 18 MMOL/L (ref 5–15)
ANION GAP SERPL CALCULATED.3IONS-SCNC: 20 MMOL/L (ref 5–15)
ANION GAP SERPL CALCULATED.3IONS-SCNC: 22 MMOL/L (ref 5–15)
ANION GAP SERPL CALCULATED.3IONS-SCNC: 9 MMOL/L (ref 5–15)
APTT PPP: 48.6 SECONDS (ref 60–90)
ARTERIAL PATENCY WRIST A: ABNORMAL
AST SERPL-CCNC: 71 U/L (ref 1–32)
AST SERPL-CCNC: 74 U/L (ref 1–32)
AST SERPL-CCNC: 76 U/L (ref 1–32)
ATMOSPHERIC PRESS: ABNORMAL MM[HG]
BACTERIA SPEC RESP CULT: NORMAL
BASE EXCESS BLDA CALC-SCNC: -2 MMOL/L (ref -5–5)
BASE EXCESS BLDA CALC-SCNC: -2.4 MMOL/L (ref 0–2)
BASE EXCESS BLDA CALC-SCNC: -21 MMOL/L (ref -5–5)
BASE EXCESS BLDA CALC-SCNC: -3.3 MMOL/L (ref 0–2)
BASE EXCESS BLDA CALC-SCNC: -3.9 MMOL/L (ref 0–2)
BASE EXCESS BLDA CALC-SCNC: -4.6 MMOL/L (ref 0–2)
BASE EXCESS BLDA CALC-SCNC: -5.8 MMOL/L (ref 0–2)
BASE EXCESS BLDA CALC-SCNC: -5.8 MMOL/L (ref 0–2)
BASE EXCESS BLDA CALC-SCNC: -6.5 MMOL/L (ref 0–2)
BASE EXCESS BLDA CALC-SCNC: -7 MMOL/L (ref -5–5)
BASE EXCESS BLDA CALC-SCNC: -7 MMOL/L (ref 0–2)
BASE EXCESS BLDA CALC-SCNC: -7 MMOL/L (ref 0–2)
BASE EXCESS BLDA CALC-SCNC: -7.1 MMOL/L (ref 0–2)
BASE EXCESS BLDA CALC-SCNC: -7.5 MMOL/L (ref 0–2)
BASE EXCESS BLDA CALC-SCNC: -7.8 MMOL/L (ref 0–2)
BASE EXCESS BLDA CALC-SCNC: -8.2 MMOL/L (ref 0–2)
BASE EXCESS BLDA CALC-SCNC: 2.1 MMOL/L (ref 0–2)
BASOPHILS # BLD AUTO: 0.01 10*3/MM3 (ref 0–0.2)
BASOPHILS # BLD AUTO: 0.01 10*3/MM3 (ref 0–0.2)
BASOPHILS # BLD AUTO: 0.02 10*3/MM3 (ref 0–0.2)
BASOPHILS # BLD AUTO: 0.03 10*3/MM3 (ref 0–0.2)
BASOPHILS NFR BLD AUTO: 0.1 % (ref 0–1.5)
BASOPHILS NFR BLD AUTO: 0.2 % (ref 0–1.5)
BASOPHILS NFR BLD AUTO: 0.2 % (ref 0–1.5)
BASOPHILS NFR BLD AUTO: 0.3 % (ref 0–1.5)
BDY SITE: ABNORMAL
BH BB BLOOD EXPIRATION DATE: NORMAL
BH BB BLOOD TYPE BARCODE: 600
BH BB BLOOD TYPE BARCODE: 6200
BH BB BLOOD TYPE BARCODE: 9500
BH BB BLOOD TYPE BARCODE: 9500
BH BB DISPENSE STATUS: NORMAL
BH BB PRODUCT CODE: NORMAL
BH BB UNIT NUMBER: NORMAL
BH CV ECHO MEAS - AO MAX PG: 16.3 MMHG
BH CV ECHO MEAS - AO ROOT DIAM: 3.2 CM
BH CV ECHO MEAS - AO V2 MAX: 201.9 CM/SEC
BH CV ECHO MEAS - IVS/LVPW: 1 CM
BH CV ECHO MEAS - IVSD: 1.1 CM
BH CV ECHO MEAS - LA DIMENSION: 3.6 CM
BH CV ECHO MEAS - LV MAX PG: 5.2 MMHG
BH CV ECHO MEAS - LV MEAN PG: 2.7 MMHG
BH CV ECHO MEAS - LV V1 MAX: 113.5 CM/SEC
BH CV ECHO MEAS - LV V1 VTI: 33.1 CM
BH CV ECHO MEAS - LVIDD: 5 CM
BH CV ECHO MEAS - LVIDS: 3.5 CM
BH CV ECHO MEAS - LVOT DIAM: 1.9 CM
BH CV ECHO MEAS - LVPWD: 1.1 CM
BH CV ECHO MEAS - MV A MAX VEL: 102.8 CM/SEC
BH CV ECHO MEAS - MV E MAX VEL: 111.9 CM/SEC
BH CV ECHO MEAS - MV E/A: 1.09
BH CV ECHO MEAS - MV MAX PG: 6.1 MMHG
BH CV ECHO MEAS - PA ACC TIME: 0.17 SEC
BH CV ECHO MEAS - PA V2 MAX: 135.9 CM/SEC
BH CV ECHO MEAS - RAP SYSTOLE: 8 MMHG
BH CV ECHO MEAS - RVDD: 2.2 CM
BH CV ECHO MEAS - RVSP: 41 MMHG
BH CV ECHO MEAS - TR MAX PG: 33.6 MMHG
BH CV ECHO MEAS - TR MAX VEL: 289.5 CM/SEC
BH CV VAS BP RIGHT ARM: NORMAL MMHG
BILIRUB SERPL-MCNC: 0.3 MG/DL (ref 0–1.2)
BILIRUB SERPL-MCNC: 0.5 MG/DL (ref 0–1.2)
BILIRUB SERPL-MCNC: 1.1 MG/DL (ref 0–1.2)
BLD GP AB SCN SERPL QL: NEGATIVE
BODY TEMPERATURE: 37
BUN SERPL-MCNC: 19 MG/DL (ref 8–23)
BUN SERPL-MCNC: 20 MG/DL (ref 8–23)
BUN SERPL-MCNC: 20 MG/DL (ref 8–23)
BUN SERPL-MCNC: 21 MG/DL (ref 8–23)
BUN SERPL-MCNC: 22 MG/DL (ref 8–23)
BUN SERPL-MCNC: 23 MG/DL (ref 8–23)
BUN SERPL-MCNC: 26 MG/DL (ref 8–23)
BUN SERPL-MCNC: 27 MG/DL (ref 8–23)
BUN SERPL-MCNC: 28 MG/DL (ref 8–23)
BUN SERPL-MCNC: 29 MG/DL (ref 8–23)
BUN SERPL-MCNC: 30 MG/DL (ref 8–23)
BUN SERPL-MCNC: 31 MG/DL (ref 8–23)
BUN SERPL-MCNC: 34 MG/DL (ref 8–23)
BUN SERPL-MCNC: 35 MG/DL (ref 8–23)
BUN SERPL-MCNC: 36 MG/DL (ref 8–23)
BUN SERPL-MCNC: 39 MG/DL (ref 8–23)
BUN SERPL-MCNC: 39 MG/DL (ref 8–23)
BUN SERPL-MCNC: 41 MG/DL (ref 8–23)
BUN SERPL-MCNC: 42 MG/DL (ref 8–23)
BUN SERPL-MCNC: 42 MG/DL (ref 8–23)
BUN SERPL-MCNC: 45 MG/DL (ref 8–23)
BUN SERPL-MCNC: 47 MG/DL (ref 8–23)
BUN SERPL-MCNC: 50 MG/DL (ref 8–23)
BUN SERPL-MCNC: 51 MG/DL (ref 8–23)
BUN SERPL-MCNC: 53 MG/DL (ref 8–23)
BUN SERPL-MCNC: 53 MG/DL (ref 8–23)
BUN SERPL-MCNC: 55 MG/DL (ref 8–23)
BUN SERPL-MCNC: 57 MG/DL (ref 8–23)
BUN SERPL-MCNC: 58 MG/DL (ref 8–23)
BUN SERPL-MCNC: 64 MG/DL (ref 8–23)
BUN/CREAT SERPL: 10.2 (ref 7–25)
BUN/CREAT SERPL: 10.5 (ref 7–25)
BUN/CREAT SERPL: 10.8 (ref 7–25)
BUN/CREAT SERPL: 10.9 (ref 7–25)
BUN/CREAT SERPL: 11 (ref 7–25)
BUN/CREAT SERPL: 11.1 (ref 7–25)
BUN/CREAT SERPL: 11.2 (ref 7–25)
BUN/CREAT SERPL: 11.6 (ref 7–25)
BUN/CREAT SERPL: 11.7 (ref 7–25)
BUN/CREAT SERPL: 11.7 (ref 7–25)
BUN/CREAT SERPL: 11.9 (ref 7–25)
BUN/CREAT SERPL: 12 (ref 7–25)
BUN/CREAT SERPL: 12.1 (ref 7–25)
BUN/CREAT SERPL: 12.2 (ref 7–25)
BUN/CREAT SERPL: 12.3 (ref 7–25)
BUN/CREAT SERPL: 12.7 (ref 7–25)
BUN/CREAT SERPL: 12.7 (ref 7–25)
BUN/CREAT SERPL: 12.9 (ref 7–25)
BUN/CREAT SERPL: 13.5 (ref 7–25)
BUN/CREAT SERPL: 13.6 (ref 7–25)
BUN/CREAT SERPL: 13.6 (ref 7–25)
BUN/CREAT SERPL: 13.7 (ref 7–25)
BUN/CREAT SERPL: 14 (ref 7–25)
BUN/CREAT SERPL: 14.1 (ref 7–25)
BUN/CREAT SERPL: 14.2 (ref 7–25)
BUN/CREAT SERPL: 14.2 (ref 7–25)
BUN/CREAT SERPL: 14.3 (ref 7–25)
BUN/CREAT SERPL: 14.4 (ref 7–25)
BUN/CREAT SERPL: 14.5 (ref 7–25)
BUN/CREAT SERPL: 14.6 (ref 7–25)
BUN/CREAT SERPL: 14.6 (ref 7–25)
BUN/CREAT SERPL: 14.8 (ref 7–25)
BUN/CREAT SERPL: 14.9 (ref 7–25)
BUN/CREAT SERPL: 15 (ref 7–25)
BUN/CREAT SERPL: 15.1 (ref 7–25)
BUN/CREAT SERPL: 15.1 (ref 7–25)
BUN/CREAT SERPL: 15.6 (ref 7–25)
BUN/CREAT SERPL: 15.7 (ref 7–25)
BUN/CREAT SERPL: 15.8 (ref 7–25)
BUN/CREAT SERPL: 16.1 (ref 7–25)
BUN/CREAT SERPL: 16.1 (ref 7–25)
BUN/CREAT SERPL: 16.5 (ref 7–25)
BUN/CREAT SERPL: 16.6 (ref 7–25)
BUN/CREAT SERPL: 16.8 (ref 7–25)
BUN/CREAT SERPL: 17 (ref 7–25)
BUN/CREAT SERPL: 17.1 (ref 7–25)
BUN/CREAT SERPL: 17.2 (ref 7–25)
BUN/CREAT SERPL: 17.6 (ref 7–25)
BUN/CREAT SERPL: 17.9 (ref 7–25)
BUN/CREAT SERPL: 18.7 (ref 7–25)
BURR CELLS BLD QL SMEAR: NORMAL
CA-I BLDA-SCNC: 0.93 MMOL/L (ref 1.2–1.32)
CA-I BLDA-SCNC: 0.97 MMOL/L (ref 1.2–1.32)
CA-I BLDA-SCNC: 0.99 MMOL/L (ref 1.2–1.32)
CA-I SERPL ISE-MCNC: 1.08 MMOL/L (ref 1.12–1.32)
CA-I SERPL ISE-MCNC: 1.14 MMOL/L (ref 1.12–1.32)
CA-I SERPL ISE-MCNC: 1.15 MMOL/L (ref 1.12–1.32)
CA-I SERPL ISE-MCNC: 1.17 MMOL/L (ref 1.12–1.32)
CA-I SERPL ISE-MCNC: 1.17 MMOL/L (ref 1.12–1.32)
CA-I SERPL ISE-MCNC: 1.18 MMOL/L (ref 1.12–1.32)
CA-I SERPL ISE-MCNC: 1.19 MMOL/L (ref 1.12–1.32)
CA-I SERPL ISE-MCNC: 1.2 MMOL/L (ref 1.12–1.32)
CA-I SERPL ISE-MCNC: 1.21 MMOL/L (ref 1.12–1.32)
CALCIUM SPEC-SCNC: 5.8 MG/DL (ref 8.6–10.5)
CALCIUM SPEC-SCNC: 6.6 MG/DL (ref 8.6–10.5)
CALCIUM SPEC-SCNC: 7.1 MG/DL (ref 8.6–10.5)
CALCIUM SPEC-SCNC: 7.2 MG/DL (ref 8.6–10.5)
CALCIUM SPEC-SCNC: 7.3 MG/DL (ref 8.6–10.5)
CALCIUM SPEC-SCNC: 7.3 MG/DL (ref 8.6–10.5)
CALCIUM SPEC-SCNC: 7.4 MG/DL (ref 8.6–10.5)
CALCIUM SPEC-SCNC: 7.5 MG/DL (ref 8.6–10.5)
CALCIUM SPEC-SCNC: 7.5 MG/DL (ref 8.6–10.5)
CALCIUM SPEC-SCNC: 7.6 MG/DL (ref 8.6–10.5)
CALCIUM SPEC-SCNC: 7.7 MG/DL (ref 8.6–10.5)
CALCIUM SPEC-SCNC: 7.7 MG/DL (ref 8.6–10.5)
CALCIUM SPEC-SCNC: 7.8 MG/DL (ref 8.6–10.5)
CALCIUM SPEC-SCNC: 7.8 MG/DL (ref 8.6–10.5)
CALCIUM SPEC-SCNC: 7.9 MG/DL (ref 8.6–10.5)
CALCIUM SPEC-SCNC: 7.9 MG/DL (ref 8.6–10.5)
CALCIUM SPEC-SCNC: 8 MG/DL (ref 8.6–10.5)
CALCIUM SPEC-SCNC: 8.1 MG/DL (ref 8.6–10.5)
CALCIUM SPEC-SCNC: 8.2 MG/DL (ref 8.6–10.5)
CALCIUM SPEC-SCNC: 8.3 MG/DL (ref 8.6–10.5)
CALCIUM SPEC-SCNC: 8.4 MG/DL (ref 8.6–10.5)
CALCIUM SPEC-SCNC: 8.4 MG/DL (ref 8.6–10.5)
CALCIUM SPEC-SCNC: 8.5 MG/DL (ref 8.6–10.5)
CALCIUM SPEC-SCNC: 8.6 MG/DL (ref 8.6–10.5)
CALCIUM SPEC-SCNC: 8.7 MG/DL (ref 8.6–10.5)
CALCIUM SPEC-SCNC: 8.8 MG/DL (ref 8.6–10.5)
CALCIUM SPEC-SCNC: 9 MG/DL (ref 8.6–10.5)
CHLORIDE SERPL-SCNC: 100 MMOL/L (ref 98–107)
CHLORIDE SERPL-SCNC: 101 MMOL/L (ref 98–107)
CHLORIDE SERPL-SCNC: 102 MMOL/L (ref 98–107)
CHLORIDE SERPL-SCNC: 103 MMOL/L (ref 98–107)
CHLORIDE SERPL-SCNC: 104 MMOL/L (ref 98–107)
CHLORIDE SERPL-SCNC: 105 MMOL/L (ref 98–107)
CHLORIDE SERPL-SCNC: 105 MMOL/L (ref 98–107)
CHLORIDE SERPL-SCNC: 106 MMOL/L (ref 98–107)
CHLORIDE SERPL-SCNC: 106 MMOL/L (ref 98–107)
CHLORIDE SERPL-SCNC: 107 MMOL/L (ref 98–107)
CHLORIDE SERPL-SCNC: 110 MMOL/L (ref 98–107)
CHLORIDE SERPL-SCNC: 111 MMOL/L (ref 98–107)
CHLORIDE SERPL-SCNC: 111 MMOL/L (ref 98–107)
CHLORIDE SERPL-SCNC: 112 MMOL/L (ref 98–107)
CHLORIDE SERPL-SCNC: 112 MMOL/L (ref 98–107)
CHLORIDE SERPL-SCNC: 114 MMOL/L (ref 98–107)
CHLORIDE SERPL-SCNC: 114 MMOL/L (ref 98–107)
CHLORIDE SERPL-SCNC: 94 MMOL/L (ref 98–107)
CHLORIDE SERPL-SCNC: 96 MMOL/L (ref 98–107)
CHLORIDE SERPL-SCNC: 98 MMOL/L (ref 98–107)
CHLORIDE SERPL-SCNC: 99 MMOL/L (ref 98–107)
CHOLEST SERPL-MCNC: 177 MG/DL (ref 0–200)
CK SERPL-CCNC: 1002 U/L (ref 20–180)
CO2 BLDA-SCNC: 11 MMOL/L (ref 24–29)
CO2 BLDA-SCNC: 18.9 MMOL/L (ref 22–33)
CO2 BLDA-SCNC: 19.4 MMOL/L (ref 22–33)
CO2 BLDA-SCNC: 20.3 MMOL/L (ref 22–33)
CO2 BLDA-SCNC: 21 MMOL/L (ref 22–33)
CO2 BLDA-SCNC: 21.1 MMOL/L (ref 22–33)
CO2 BLDA-SCNC: 21.3 MMOL/L (ref 22–33)
CO2 BLDA-SCNC: 22 MMOL/L (ref 22–33)
CO2 BLDA-SCNC: 22 MMOL/L (ref 22–33)
CO2 BLDA-SCNC: 22 MMOL/L (ref 24–29)
CO2 BLDA-SCNC: 22.1 MMOL/L (ref 22–33)
CO2 BLDA-SCNC: 22.5 MMOL/L (ref 22–33)
CO2 BLDA-SCNC: 22.5 MMOL/L (ref 22–33)
CO2 BLDA-SCNC: 23 MMOL/L (ref 22–33)
CO2 BLDA-SCNC: 23.1 MMOL/L (ref 22–33)
CO2 BLDA-SCNC: 26 MMOL/L (ref 24–29)
CO2 BLDA-SCNC: 26.9 MMOL/L (ref 22–33)
CO2 SERPL-SCNC: 16 MMOL/L (ref 22–29)
CO2 SERPL-SCNC: 17 MMOL/L (ref 22–29)
CO2 SERPL-SCNC: 18 MMOL/L (ref 22–29)
CO2 SERPL-SCNC: 19 MMOL/L (ref 22–29)
CO2 SERPL-SCNC: 20 MMOL/L (ref 22–29)
CO2 SERPL-SCNC: 21 MMOL/L (ref 22–29)
CO2 SERPL-SCNC: 22 MMOL/L (ref 22–29)
CO2 SERPL-SCNC: 23 MMOL/L (ref 22–29)
CO2 SERPL-SCNC: 25 MMOL/L (ref 22–29)
COHGB MFR BLD: 0.9 % (ref 0–2)
COHGB MFR BLD: 1.1 % (ref 0–2)
COHGB MFR BLD: 1.2 % (ref 0–2)
COHGB MFR BLD: 1.2 % (ref 0–2)
COHGB MFR BLD: 1.4 % (ref 0–2)
COHGB MFR BLD: 1.6 % (ref 0–2)
CREAT SERPL-MCNC: 1.17 MG/DL (ref 0.57–1)
CREAT SERPL-MCNC: 1.18 MG/DL (ref 0.57–1)
CREAT SERPL-MCNC: 1.21 MG/DL (ref 0.57–1)
CREAT SERPL-MCNC: 1.22 MG/DL (ref 0.57–1)
CREAT SERPL-MCNC: 1.25 MG/DL (ref 0.57–1)
CREAT SERPL-MCNC: 1.29 MG/DL (ref 0.57–1)
CREAT SERPL-MCNC: 1.34 MG/DL (ref 0.57–1)
CREAT SERPL-MCNC: 1.37 MG/DL (ref 0.57–1)
CREAT SERPL-MCNC: 1.37 MG/DL (ref 0.57–1)
CREAT SERPL-MCNC: 1.46 MG/DL (ref 0.57–1)
CREAT SERPL-MCNC: 1.47 MG/DL (ref 0.57–1)
CREAT SERPL-MCNC: 1.51 MG/DL (ref 0.57–1)
CREAT SERPL-MCNC: 1.52 MG/DL (ref 0.57–1)
CREAT SERPL-MCNC: 1.53 MG/DL (ref 0.57–1)
CREAT SERPL-MCNC: 1.54 MG/DL (ref 0.57–1)
CREAT SERPL-MCNC: 1.58 MG/DL (ref 0.57–1)
CREAT SERPL-MCNC: 1.58 MG/DL (ref 0.57–1)
CREAT SERPL-MCNC: 1.59 MG/DL (ref 0.57–1)
CREAT SERPL-MCNC: 1.62 MG/DL (ref 0.57–1)
CREAT SERPL-MCNC: 1.63 MG/DL (ref 0.57–1)
CREAT SERPL-MCNC: 1.63 MG/DL (ref 0.57–1)
CREAT SERPL-MCNC: 1.64 MG/DL (ref 0.57–1)
CREAT SERPL-MCNC: 1.65 MG/DL (ref 0.57–1)
CREAT SERPL-MCNC: 1.83 MG/DL (ref 0.57–1)
CREAT SERPL-MCNC: 1.9 MG/DL (ref 0.57–1)
CREAT SERPL-MCNC: 1.91 MG/DL (ref 0.57–1)
CREAT SERPL-MCNC: 2.04 MG/DL (ref 0.57–1)
CREAT SERPL-MCNC: 2.14 MG/DL (ref 0.57–1)
CREAT SERPL-MCNC: 2.17 MG/DL (ref 0.57–1)
CREAT SERPL-MCNC: 2.2 MG/DL (ref 0.57–1)
CREAT SERPL-MCNC: 2.3 MG/DL (ref 0.57–1)
CREAT SERPL-MCNC: 2.41 MG/DL (ref 0.57–1)
CREAT SERPL-MCNC: 2.45 MG/DL (ref 0.57–1)
CREAT SERPL-MCNC: 2.58 MG/DL (ref 0.57–1)
CREAT SERPL-MCNC: 2.67 MG/DL (ref 0.57–1)
CREAT SERPL-MCNC: 2.94 MG/DL (ref 0.57–1)
CREAT SERPL-MCNC: 3.32 MG/DL (ref 0.57–1)
CREAT SERPL-MCNC: 3.34 MG/DL (ref 0.57–1)
CREAT SERPL-MCNC: 3.48 MG/DL (ref 0.57–1)
CREAT SERPL-MCNC: 3.7 MG/DL (ref 0.57–1)
CREAT SERPL-MCNC: 4.04 MG/DL (ref 0.57–1)
CREAT SERPL-MCNC: 4.1 MG/DL (ref 0.57–1)
CREAT SERPL-MCNC: 4.2 MG/DL (ref 0.57–1)
CREAT SERPL-MCNC: 4.29 MG/DL (ref 0.57–1)
CREAT SERPL-MCNC: 4.34 MG/DL (ref 0.57–1)
CREAT SERPL-MCNC: 4.4 MG/DL (ref 0.57–1)
CREAT SERPL-MCNC: 4.72 MG/DL (ref 0.57–1)
CREAT SERPL-MCNC: 4.89 MG/DL (ref 0.57–1)
CREAT SERPL-MCNC: 5.32 MG/DL (ref 0.57–1)
CREAT SERPL-MCNC: 5.45 MG/DL (ref 0.57–1)
CREAT SERPL-MCNC: 5.85 MG/DL (ref 0.57–1)
CREAT SERPL-MCNC: 5.85 MG/DL (ref 0.57–1)
CROSSMATCH INTERPRETATION: NORMAL
CRP SERPL-MCNC: 40.25 MG/DL (ref 0–0.5)
D-LACTATE SERPL-SCNC: 0.7 MMOL/L (ref 0.5–2)
D-LACTATE SERPL-SCNC: 0.7 MMOL/L (ref 0.5–2)
D-LACTATE SERPL-SCNC: 0.8 MMOL/L (ref 0.5–2)
D-LACTATE SERPL-SCNC: 1 MMOL/L (ref 0.5–2)
D-LACTATE SERPL-SCNC: 1 MMOL/L (ref 0.5–2)
D-LACTATE SERPL-SCNC: 1.3 MMOL/L (ref 0.5–2)
D-LACTATE SERPL-SCNC: 1.5 MMOL/L (ref 0.5–2)
D-LACTATE SERPL-SCNC: 1.5 MMOL/L (ref 0.5–2)
D-LACTATE SERPL-SCNC: 1.6 MMOL/L (ref 0.5–2)
D-LACTATE SERPL-SCNC: 1.8 MMOL/L (ref 0.5–2)
D-LACTATE SERPL-SCNC: 1.8 MMOL/L (ref 0.5–2)
D-LACTATE SERPL-SCNC: 10.1 MMOL/L (ref 0.5–2)
D-LACTATE SERPL-SCNC: 10.1 MMOL/L (ref 0.5–2)
D-LACTATE SERPL-SCNC: 2 MMOL/L (ref 0.5–2)
D-LACTATE SERPL-SCNC: 5 MMOL/L (ref 0.5–2)
DEPRECATED RDW RBC AUTO: 44.8 FL (ref 37–54)
DEPRECATED RDW RBC AUTO: 44.9 FL (ref 37–54)
DEPRECATED RDW RBC AUTO: 45.9 FL (ref 37–54)
DEPRECATED RDW RBC AUTO: 47.1 FL (ref 37–54)
DEPRECATED RDW RBC AUTO: 48.6 FL (ref 37–54)
DEPRECATED RDW RBC AUTO: 50.1 FL (ref 37–54)
DEPRECATED RDW RBC AUTO: 50.2 FL (ref 37–54)
DEPRECATED RDW RBC AUTO: 51.2 FL (ref 37–54)
DEPRECATED RDW RBC AUTO: 51.3 FL (ref 37–54)
DEPRECATED RDW RBC AUTO: 51.8 FL (ref 37–54)
DEPRECATED RDW RBC AUTO: 52.5 FL (ref 37–54)
DEPRECATED RDW RBC AUTO: 52.7 FL (ref 37–54)
DEPRECATED RDW RBC AUTO: 52.7 FL (ref 37–54)
DEPRECATED RDW RBC AUTO: 53.2 FL (ref 37–54)
DEPRECATED RDW RBC AUTO: 53.2 FL (ref 37–54)
DEPRECATED RDW RBC AUTO: 53.4 FL (ref 37–54)
DEPRECATED RDW RBC AUTO: 53.4 FL (ref 37–54)
DEPRECATED RDW RBC AUTO: 54.4 FL (ref 37–54)
DEPRECATED RDW RBC AUTO: 54.8 FL (ref 37–54)
DEPRECATED RDW RBC AUTO: 55.5 FL (ref 37–54)
DEPRECATED RDW RBC AUTO: 55.5 FL (ref 37–54)
DEPRECATED RDW RBC AUTO: 55.8 FL (ref 37–54)
DEPRECATED RDW RBC AUTO: 56.9 FL (ref 37–54)
DEPRECATED RDW RBC AUTO: 57.1 FL (ref 37–54)
DEPRECATED RDW RBC AUTO: 57.6 FL (ref 37–54)
DEPRECATED RDW RBC AUTO: 58 FL (ref 37–54)
DEPRECATED RDW RBC AUTO: 58.5 FL (ref 37–54)
DEPRECATED RDW RBC AUTO: 58.5 FL (ref 37–54)
DEPRECATED RDW RBC AUTO: 58.6 FL (ref 37–54)
DEPRECATED RDW RBC AUTO: 58.8 FL (ref 37–54)
DEPRECATED RDW RBC AUTO: 58.9 FL (ref 37–54)
DEPRECATED RDW RBC AUTO: 59 FL (ref 37–54)
DEPRECATED RDW RBC AUTO: 59.5 FL (ref 37–54)
DEPRECATED RDW RBC AUTO: 60.1 FL (ref 37–54)
DEPRECATED RDW RBC AUTO: 61.1 FL (ref 37–54)
DEPRECATED RDW RBC AUTO: 61.2 FL (ref 37–54)
DEPRECATED RDW RBC AUTO: 61.5 FL (ref 37–54)
DEPRECATED RDW RBC AUTO: 62 FL (ref 37–54)
EGFRCR SERPLBLD CKD-EPI 2021: 10.1 ML/MIN/1.73
EGFRCR SERPLBLD CKD-EPI 2021: 10.2 ML/MIN/1.73
EGFRCR SERPLBLD CKD-EPI 2021: 10.4 ML/MIN/1.73
EGFRCR SERPLBLD CKD-EPI 2021: 10.7 ML/MIN/1.73
EGFRCR SERPLBLD CKD-EPI 2021: 11 ML/MIN/1.73
EGFRCR SERPLBLD CKD-EPI 2021: 11.2 ML/MIN/1.73
EGFRCR SERPLBLD CKD-EPI 2021: 12.4 ML/MIN/1.73
EGFRCR SERPLBLD CKD-EPI 2021: 13.3 ML/MIN/1.73
EGFRCR SERPLBLD CKD-EPI 2021: 14 ML/MIN/1.73
EGFRCR SERPLBLD CKD-EPI 2021: 14.1 ML/MIN/1.73
EGFRCR SERPLBLD CKD-EPI 2021: 16.3 ML/MIN/1.73
EGFRCR SERPLBLD CKD-EPI 2021: 18.3 ML/MIN/1.73
EGFRCR SERPLBLD CKD-EPI 2021: 19.1 ML/MIN/1.73
EGFRCR SERPLBLD CKD-EPI 2021: 20.3 ML/MIN/1.73
EGFRCR SERPLBLD CKD-EPI 2021: 20.7 ML/MIN/1.73
EGFRCR SERPLBLD CKD-EPI 2021: 21.9 ML/MIN/1.73
EGFRCR SERPLBLD CKD-EPI 2021: 23.1 ML/MIN/1.73
EGFRCR SERPLBLD CKD-EPI 2021: 23.5 ML/MIN/1.73
EGFRCR SERPLBLD CKD-EPI 2021: 23.9 ML/MIN/1.73
EGFRCR SERPLBLD CKD-EPI 2021: 25.3 ML/MIN/1.73
EGFRCR SERPLBLD CKD-EPI 2021: 27.4 ML/MIN/1.73
EGFRCR SERPLBLD CKD-EPI 2021: 27.6 ML/MIN/1.73
EGFRCR SERPLBLD CKD-EPI 2021: 28.9 ML/MIN/1.73
EGFRCR SERPLBLD CKD-EPI 2021: 32.7 ML/MIN/1.73
EGFRCR SERPLBLD CKD-EPI 2021: 32.9 ML/MIN/1.73
EGFRCR SERPLBLD CKD-EPI 2021: 33.2 ML/MIN/1.73
EGFRCR SERPLBLD CKD-EPI 2021: 33.2 ML/MIN/1.73
EGFRCR SERPLBLD CKD-EPI 2021: 33.4 ML/MIN/1.73
EGFRCR SERPLBLD CKD-EPI 2021: 34.2 ML/MIN/1.73
EGFRCR SERPLBLD CKD-EPI 2021: 34.4 ML/MIN/1.73
EGFRCR SERPLBLD CKD-EPI 2021: 34.4 ML/MIN/1.73
EGFRCR SERPLBLD CKD-EPI 2021: 35.5 ML/MIN/1.73
EGFRCR SERPLBLD CKD-EPI 2021: 35.8 ML/MIN/1.73
EGFRCR SERPLBLD CKD-EPI 2021: 36.1 ML/MIN/1.73
EGFRCR SERPLBLD CKD-EPI 2021: 36.4 ML/MIN/1.73
EGFRCR SERPLBLD CKD-EPI 2021: 37.5 ML/MIN/1.73
EGFRCR SERPLBLD CKD-EPI 2021: 37.9 ML/MIN/1.73
EGFRCR SERPLBLD CKD-EPI 2021: 40.9 ML/MIN/1.73
EGFRCR SERPLBLD CKD-EPI 2021: 40.9 ML/MIN/1.73
EGFRCR SERPLBLD CKD-EPI 2021: 42 ML/MIN/1.73
EGFRCR SERPLBLD CKD-EPI 2021: 43.9 ML/MIN/1.73
EGFRCR SERPLBLD CKD-EPI 2021: 45.6 ML/MIN/1.73
EGFRCR SERPLBLD CKD-EPI 2021: 47 ML/MIN/1.73
EGFRCR SERPLBLD CKD-EPI 2021: 47.4 ML/MIN/1.73
EGFRCR SERPLBLD CKD-EPI 2021: 48.9 ML/MIN/1.73
EGFRCR SERPLBLD CKD-EPI 2021: 49.4 ML/MIN/1.73
EGFRCR SERPLBLD CKD-EPI 2021: 7.2 ML/MIN/1.73
EGFRCR SERPLBLD CKD-EPI 2021: 7.2 ML/MIN/1.73
EGFRCR SERPLBLD CKD-EPI 2021: 7.8 ML/MIN/1.73
EGFRCR SERPLBLD CKD-EPI 2021: 8 ML/MIN/1.73
EGFRCR SERPLBLD CKD-EPI 2021: 8.9 ML/MIN/1.73
EGFRCR SERPLBLD CKD-EPI 2021: 9.3 ML/MIN/1.73
EOSINOPHIL # BLD AUTO: 0 10*3/MM3 (ref 0–0.4)
EOSINOPHIL # BLD AUTO: 0.01 10*3/MM3 (ref 0–0.4)
EOSINOPHIL # BLD AUTO: 0.01 10*3/MM3 (ref 0–0.4)
EOSINOPHIL # BLD AUTO: 0.09 10*3/MM3 (ref 0–0.4)
EOSINOPHIL NFR BLD AUTO: 0 % (ref 0.3–6.2)
EOSINOPHIL NFR BLD AUTO: 0.1 % (ref 0.3–6.2)
EOSINOPHIL NFR BLD AUTO: 0.1 % (ref 0.3–6.2)
EOSINOPHIL NFR BLD AUTO: 1.5 % (ref 0.3–6.2)
EPAP: 0
ERYTHROCYTE [DISTWIDTH] IN BLOOD BY AUTOMATED COUNT: 15 % (ref 12.3–15.4)
ERYTHROCYTE [DISTWIDTH] IN BLOOD BY AUTOMATED COUNT: 15.1 % (ref 12.3–15.4)
ERYTHROCYTE [DISTWIDTH] IN BLOOD BY AUTOMATED COUNT: 15.6 % (ref 12.3–15.4)
ERYTHROCYTE [DISTWIDTH] IN BLOOD BY AUTOMATED COUNT: 16.1 % (ref 12.3–15.4)
ERYTHROCYTE [DISTWIDTH] IN BLOOD BY AUTOMATED COUNT: 16.1 % (ref 12.3–15.4)
ERYTHROCYTE [DISTWIDTH] IN BLOOD BY AUTOMATED COUNT: 16.2 % (ref 12.3–15.4)
ERYTHROCYTE [DISTWIDTH] IN BLOOD BY AUTOMATED COUNT: 16.4 % (ref 12.3–15.4)
ERYTHROCYTE [DISTWIDTH] IN BLOOD BY AUTOMATED COUNT: 16.4 % (ref 12.3–15.4)
ERYTHROCYTE [DISTWIDTH] IN BLOOD BY AUTOMATED COUNT: 16.5 % (ref 12.3–15.4)
ERYTHROCYTE [DISTWIDTH] IN BLOOD BY AUTOMATED COUNT: 16.6 % (ref 12.3–15.4)
ERYTHROCYTE [DISTWIDTH] IN BLOOD BY AUTOMATED COUNT: 16.6 % (ref 12.3–15.4)
ERYTHROCYTE [DISTWIDTH] IN BLOOD BY AUTOMATED COUNT: 16.7 % (ref 12.3–15.4)
ERYTHROCYTE [DISTWIDTH] IN BLOOD BY AUTOMATED COUNT: 16.7 % (ref 12.3–15.4)
ERYTHROCYTE [DISTWIDTH] IN BLOOD BY AUTOMATED COUNT: 16.8 % (ref 12.3–15.4)
ERYTHROCYTE [DISTWIDTH] IN BLOOD BY AUTOMATED COUNT: 16.8 % (ref 12.3–15.4)
ERYTHROCYTE [DISTWIDTH] IN BLOOD BY AUTOMATED COUNT: 16.9 % (ref 12.3–15.4)
ERYTHROCYTE [DISTWIDTH] IN BLOOD BY AUTOMATED COUNT: 16.9 % (ref 12.3–15.4)
ERYTHROCYTE [DISTWIDTH] IN BLOOD BY AUTOMATED COUNT: 17 % (ref 12.3–15.4)
ERYTHROCYTE [DISTWIDTH] IN BLOOD BY AUTOMATED COUNT: 17.1 % (ref 12.3–15.4)
ERYTHROCYTE [DISTWIDTH] IN BLOOD BY AUTOMATED COUNT: 17.2 % (ref 12.3–15.4)
ERYTHROCYTE [DISTWIDTH] IN BLOOD BY AUTOMATED COUNT: 17.2 % (ref 12.3–15.4)
ERYTHROCYTE [DISTWIDTH] IN BLOOD BY AUTOMATED COUNT: 17.3 % (ref 12.3–15.4)
ERYTHROCYTE [DISTWIDTH] IN BLOOD BY AUTOMATED COUNT: 17.4 % (ref 12.3–15.4)
ERYTHROCYTE [DISTWIDTH] IN BLOOD BY AUTOMATED COUNT: 17.5 % (ref 12.3–15.4)
ERYTHROCYTE [DISTWIDTH] IN BLOOD BY AUTOMATED COUNT: 17.6 % (ref 12.3–15.4)
ERYTHROCYTE [DISTWIDTH] IN BLOOD BY AUTOMATED COUNT: 17.8 % (ref 12.3–15.4)
ERYTHROCYTE [DISTWIDTH] IN BLOOD BY AUTOMATED COUNT: 17.8 % (ref 12.3–15.4)
ERYTHROCYTE [DISTWIDTH] IN BLOOD BY AUTOMATED COUNT: 18.1 % (ref 12.3–15.4)
ERYTHROCYTE [DISTWIDTH] IN BLOOD BY AUTOMATED COUNT: 18.2 % (ref 12.3–15.4)
ERYTHROCYTE [DISTWIDTH] IN BLOOD BY AUTOMATED COUNT: 18.4 % (ref 12.3–15.4)
ERYTHROCYTE [DISTWIDTH] IN BLOOD BY AUTOMATED COUNT: 18.4 % (ref 12.3–15.4)
ERYTHROCYTE [DISTWIDTH] IN BLOOD BY AUTOMATED COUNT: 18.5 % (ref 12.3–15.4)
ERYTHROCYTE [DISTWIDTH] IN BLOOD BY AUTOMATED COUNT: 18.6 % (ref 12.3–15.4)
FIBRINOGEN PPP-MCNC: 277 MG/DL (ref 203–470)
GLOBULIN UR ELPH-MCNC: 2 GM/DL
GLOBULIN UR ELPH-MCNC: 3 GM/DL
GLOBULIN UR ELPH-MCNC: 3.3 GM/DL
GLUCOSE BLDC GLUCOMTR-MCNC: 101 MG/DL (ref 70–130)
GLUCOSE BLDC GLUCOMTR-MCNC: 102 MG/DL (ref 70–130)
GLUCOSE BLDC GLUCOMTR-MCNC: 102 MG/DL (ref 70–130)
GLUCOSE BLDC GLUCOMTR-MCNC: 106 MG/DL (ref 70–130)
GLUCOSE BLDC GLUCOMTR-MCNC: 109 MG/DL (ref 70–130)
GLUCOSE BLDC GLUCOMTR-MCNC: 111 MG/DL (ref 70–130)
GLUCOSE BLDC GLUCOMTR-MCNC: 115 MG/DL (ref 70–130)
GLUCOSE BLDC GLUCOMTR-MCNC: 115 MG/DL (ref 70–130)
GLUCOSE BLDC GLUCOMTR-MCNC: 116 MG/DL (ref 70–130)
GLUCOSE BLDC GLUCOMTR-MCNC: 119 MG/DL (ref 70–130)
GLUCOSE BLDC GLUCOMTR-MCNC: 121 MG/DL (ref 70–130)
GLUCOSE BLDC GLUCOMTR-MCNC: 123 MG/DL (ref 70–130)
GLUCOSE BLDC GLUCOMTR-MCNC: 123 MG/DL (ref 70–130)
GLUCOSE BLDC GLUCOMTR-MCNC: 124 MG/DL (ref 70–130)
GLUCOSE BLDC GLUCOMTR-MCNC: 125 MG/DL (ref 70–130)
GLUCOSE BLDC GLUCOMTR-MCNC: 126 MG/DL (ref 70–130)
GLUCOSE BLDC GLUCOMTR-MCNC: 128 MG/DL (ref 70–130)
GLUCOSE BLDC GLUCOMTR-MCNC: 135 MG/DL (ref 70–130)
GLUCOSE BLDC GLUCOMTR-MCNC: 137 MG/DL (ref 70–130)
GLUCOSE BLDC GLUCOMTR-MCNC: 146 MG/DL (ref 70–130)
GLUCOSE BLDC GLUCOMTR-MCNC: 159 MG/DL (ref 70–130)
GLUCOSE BLDC GLUCOMTR-MCNC: 159 MG/DL (ref 70–130)
GLUCOSE BLDC GLUCOMTR-MCNC: 172 MG/DL (ref 70–130)
GLUCOSE BLDC GLUCOMTR-MCNC: 195 MG/DL (ref 70–130)
GLUCOSE BLDC GLUCOMTR-MCNC: 196 MG/DL (ref 70–130)
GLUCOSE BLDC GLUCOMTR-MCNC: 236 MG/DL (ref 70–130)
GLUCOSE BLDC GLUCOMTR-MCNC: 318 MG/DL (ref 70–130)
GLUCOSE BLDC GLUCOMTR-MCNC: 319 MG/DL (ref 70–130)
GLUCOSE BLDC GLUCOMTR-MCNC: 362 MG/DL (ref 70–130)
GLUCOSE BLDC GLUCOMTR-MCNC: 53 MG/DL (ref 70–130)
GLUCOSE BLDC GLUCOMTR-MCNC: 58 MG/DL (ref 70–130)
GLUCOSE BLDC GLUCOMTR-MCNC: 59 MG/DL (ref 70–130)
GLUCOSE BLDC GLUCOMTR-MCNC: 71 MG/DL (ref 70–130)
GLUCOSE BLDC GLUCOMTR-MCNC: 71 MG/DL (ref 70–130)
GLUCOSE BLDC GLUCOMTR-MCNC: 75 MG/DL (ref 70–130)
GLUCOSE BLDC GLUCOMTR-MCNC: 77 MG/DL (ref 70–130)
GLUCOSE BLDC GLUCOMTR-MCNC: 80 MG/DL (ref 70–130)
GLUCOSE BLDC GLUCOMTR-MCNC: 80 MG/DL (ref 70–130)
GLUCOSE BLDC GLUCOMTR-MCNC: 81 MG/DL (ref 70–130)
GLUCOSE BLDC GLUCOMTR-MCNC: 81 MG/DL (ref 70–130)
GLUCOSE BLDC GLUCOMTR-MCNC: 82 MG/DL (ref 70–130)
GLUCOSE BLDC GLUCOMTR-MCNC: 83 MG/DL (ref 70–130)
GLUCOSE BLDC GLUCOMTR-MCNC: 84 MG/DL (ref 70–130)
GLUCOSE BLDC GLUCOMTR-MCNC: 85 MG/DL (ref 70–130)
GLUCOSE BLDC GLUCOMTR-MCNC: 87 MG/DL (ref 70–130)
GLUCOSE BLDC GLUCOMTR-MCNC: 89 MG/DL (ref 70–130)
GLUCOSE BLDC GLUCOMTR-MCNC: 89 MG/DL (ref 70–130)
GLUCOSE BLDC GLUCOMTR-MCNC: 91 MG/DL (ref 70–130)
GLUCOSE BLDC GLUCOMTR-MCNC: 94 MG/DL (ref 70–130)
GLUCOSE BLDC GLUCOMTR-MCNC: 96 MG/DL (ref 70–130)
GLUCOSE BLDC GLUCOMTR-MCNC: 97 MG/DL (ref 70–130)
GLUCOSE SERPL-MCNC: 106 MG/DL (ref 65–99)
GLUCOSE SERPL-MCNC: 106 MG/DL (ref 65–99)
GLUCOSE SERPL-MCNC: 107 MG/DL (ref 65–99)
GLUCOSE SERPL-MCNC: 108 MG/DL (ref 65–99)
GLUCOSE SERPL-MCNC: 109 MG/DL (ref 65–99)
GLUCOSE SERPL-MCNC: 109 MG/DL (ref 65–99)
GLUCOSE SERPL-MCNC: 112 MG/DL (ref 65–99)
GLUCOSE SERPL-MCNC: 114 MG/DL (ref 65–99)
GLUCOSE SERPL-MCNC: 116 MG/DL (ref 65–99)
GLUCOSE SERPL-MCNC: 119 MG/DL (ref 65–99)
GLUCOSE SERPL-MCNC: 121 MG/DL (ref 65–99)
GLUCOSE SERPL-MCNC: 121 MG/DL (ref 65–99)
GLUCOSE SERPL-MCNC: 123 MG/DL (ref 65–99)
GLUCOSE SERPL-MCNC: 136 MG/DL (ref 65–99)
GLUCOSE SERPL-MCNC: 137 MG/DL (ref 65–99)
GLUCOSE SERPL-MCNC: 143 MG/DL (ref 65–99)
GLUCOSE SERPL-MCNC: 145 MG/DL (ref 65–99)
GLUCOSE SERPL-MCNC: 172 MG/DL (ref 65–99)
GLUCOSE SERPL-MCNC: 175 MG/DL (ref 65–99)
GLUCOSE SERPL-MCNC: 176 MG/DL (ref 65–99)
GLUCOSE SERPL-MCNC: 256 MG/DL (ref 65–99)
GLUCOSE SERPL-MCNC: 259 MG/DL (ref 65–99)
GLUCOSE SERPL-MCNC: 62 MG/DL (ref 65–99)
GLUCOSE SERPL-MCNC: 63 MG/DL (ref 65–99)
GLUCOSE SERPL-MCNC: 64 MG/DL (ref 65–99)
GLUCOSE SERPL-MCNC: 64 MG/DL (ref 65–99)
GLUCOSE SERPL-MCNC: 65 MG/DL (ref 65–99)
GLUCOSE SERPL-MCNC: 72 MG/DL (ref 65–99)
GLUCOSE SERPL-MCNC: 73 MG/DL (ref 65–99)
GLUCOSE SERPL-MCNC: 75 MG/DL (ref 65–99)
GLUCOSE SERPL-MCNC: 76 MG/DL (ref 65–99)
GLUCOSE SERPL-MCNC: 77 MG/DL (ref 65–99)
GLUCOSE SERPL-MCNC: 78 MG/DL (ref 65–99)
GLUCOSE SERPL-MCNC: 81 MG/DL (ref 65–99)
GLUCOSE SERPL-MCNC: 82 MG/DL (ref 65–99)
GLUCOSE SERPL-MCNC: 86 MG/DL (ref 65–99)
GLUCOSE SERPL-MCNC: 88 MG/DL (ref 65–99)
GLUCOSE SERPL-MCNC: 88 MG/DL (ref 65–99)
GLUCOSE SERPL-MCNC: 90 MG/DL (ref 65–99)
GLUCOSE SERPL-MCNC: 92 MG/DL (ref 65–99)
GLUCOSE SERPL-MCNC: 94 MG/DL (ref 65–99)
GLUCOSE SERPL-MCNC: 96 MG/DL (ref 65–99)
GLUCOSE SERPL-MCNC: 96 MG/DL (ref 65–99)
GLUCOSE SERPL-MCNC: 97 MG/DL (ref 65–99)
GLUCOSE SERPL-MCNC: 98 MG/DL (ref 65–99)
GLUCOSE SERPL-MCNC: 99 MG/DL (ref 65–99)
GRAM STN SPEC: NORMAL
HAV IGM SERPL QL IA: NORMAL
HBV CORE IGM SERPL QL IA: NORMAL
HBV SURFACE AG SERPL QL IA: NORMAL
HCO3 BLDA-SCNC: 17.8 MMOL/L (ref 20–26)
HCO3 BLDA-SCNC: 18.4 MMOL/L (ref 20–26)
HCO3 BLDA-SCNC: 19 MMOL/L (ref 20–26)
HCO3 BLDA-SCNC: 19.5 MMOL/L (ref 20–26)
HCO3 BLDA-SCNC: 20.1 MMOL/L (ref 20–26)
HCO3 BLDA-SCNC: 20.3 MMOL/L (ref 20–26)
HCO3 BLDA-SCNC: 20.4 MMOL/L (ref 20–26)
HCO3 BLDA-SCNC: 20.4 MMOL/L (ref 22–26)
HCO3 BLDA-SCNC: 20.5 MMOL/L (ref 20–26)
HCO3 BLDA-SCNC: 20.6 MMOL/L (ref 20–26)
HCO3 BLDA-SCNC: 21.4 MMOL/L (ref 20–26)
HCO3 BLDA-SCNC: 21.4 MMOL/L (ref 20–26)
HCO3 BLDA-SCNC: 21.5 MMOL/L (ref 20–26)
HCO3 BLDA-SCNC: 21.5 MMOL/L (ref 20–26)
HCO3 BLDA-SCNC: 24.4 MMOL/L (ref 22–26)
HCO3 BLDA-SCNC: 25.8 MMOL/L (ref 20–26)
HCO3 BLDA-SCNC: 9.7 MMOL/L (ref 22–26)
HCT VFR BLD AUTO: 19.2 % (ref 34–46.6)
HCT VFR BLD AUTO: 20.2 % (ref 34–46.6)
HCT VFR BLD AUTO: 20.5 % (ref 34–46.6)
HCT VFR BLD AUTO: 20.6 % (ref 34–46.6)
HCT VFR BLD AUTO: 20.9 % (ref 34–46.6)
HCT VFR BLD AUTO: 20.9 % (ref 34–46.6)
HCT VFR BLD AUTO: 21.4 % (ref 34–46.6)
HCT VFR BLD AUTO: 21.6 % (ref 34–46.6)
HCT VFR BLD AUTO: 21.6 % (ref 34–46.6)
HCT VFR BLD AUTO: 21.8 % (ref 34–46.6)
HCT VFR BLD AUTO: 22.4 % (ref 34–46.6)
HCT VFR BLD AUTO: 22.8 % (ref 34–46.6)
HCT VFR BLD AUTO: 23.2 % (ref 34–46.6)
HCT VFR BLD AUTO: 23.5 % (ref 34–46.6)
HCT VFR BLD AUTO: 23.9 % (ref 34–46.6)
HCT VFR BLD AUTO: 24.1 % (ref 34–46.6)
HCT VFR BLD AUTO: 24.1 % (ref 34–46.6)
HCT VFR BLD AUTO: 24.2 % (ref 34–46.6)
HCT VFR BLD AUTO: 24.2 % (ref 34–46.6)
HCT VFR BLD AUTO: 24.3 % (ref 34–46.6)
HCT VFR BLD AUTO: 25 % (ref 34–46.6)
HCT VFR BLD AUTO: 25.1 % (ref 34–46.6)
HCT VFR BLD AUTO: 25.2 % (ref 34–46.6)
HCT VFR BLD AUTO: 25.3 % (ref 34–46.6)
HCT VFR BLD AUTO: 25.4 % (ref 34–46.6)
HCT VFR BLD AUTO: 25.4 % (ref 34–46.6)
HCT VFR BLD AUTO: 25.5 % (ref 34–46.6)
HCT VFR BLD AUTO: 25.5 % (ref 34–46.6)
HCT VFR BLD AUTO: 25.6 % (ref 34–46.6)
HCT VFR BLD AUTO: 25.8 % (ref 34–46.6)
HCT VFR BLD AUTO: 26.1 % (ref 34–46.6)
HCT VFR BLD AUTO: 26.3 % (ref 34–46.6)
HCT VFR BLD AUTO: 26.7 % (ref 34–46.6)
HCT VFR BLD AUTO: 27.1 % (ref 34–46.6)
HCT VFR BLD AUTO: 27.2 % (ref 34–46.6)
HCT VFR BLD AUTO: 27.6 % (ref 34–46.6)
HCT VFR BLD AUTO: 29 % (ref 34–46.6)
HCT VFR BLD CALC: 21.3 % (ref 38–51)
HCT VFR BLD CALC: 23.6 % (ref 38–51)
HCT VFR BLD CALC: 23.7 % (ref 38–51)
HCT VFR BLD CALC: 24.1 % (ref 38–51)
HCT VFR BLD CALC: 24.2 % (ref 38–51)
HCT VFR BLD CALC: 24.5 % (ref 38–51)
HCT VFR BLD CALC: 24.7 % (ref 38–51)
HCT VFR BLD CALC: 25.4 % (ref 38–51)
HCT VFR BLD CALC: 26.6 % (ref 38–51)
HCT VFR BLD CALC: 27 % (ref 38–51)
HCT VFR BLD CALC: 27 % (ref 38–51)
HCT VFR BLD CALC: 30.9 % (ref 38–51)
HCT VFR BLD CALC: 31.9 % (ref 38–51)
HCT VFR BLD CALC: 32.3 % (ref 38–51)
HCT VFR BLDA CALC: 17 % (ref 38–51)
HCT VFR BLDA CALC: 20 % (ref 38–51)
HCT VFR BLDA CALC: 23 % (ref 38–51)
HCV AB SER QL: NORMAL
HGB BLD-MCNC: 10 G/DL (ref 12–15.9)
HGB BLD-MCNC: 10 G/DL (ref 12–15.9)
HGB BLD-MCNC: 10.1 G/DL (ref 12–15.9)
HGB BLD-MCNC: 6.6 G/DL (ref 12–15.9)
HGB BLD-MCNC: 6.6 G/DL (ref 12–15.9)
HGB BLD-MCNC: 6.9 G/DL (ref 12–15.9)
HGB BLD-MCNC: 7 G/DL (ref 12–15.9)
HGB BLD-MCNC: 7.3 G/DL (ref 12–15.9)
HGB BLD-MCNC: 7.4 G/DL (ref 12–15.9)
HGB BLD-MCNC: 7.4 G/DL (ref 12–15.9)
HGB BLD-MCNC: 7.5 G/DL (ref 12–15.9)
HGB BLD-MCNC: 7.6 G/DL (ref 12–15.9)
HGB BLD-MCNC: 7.6 G/DL (ref 12–15.9)
HGB BLD-MCNC: 7.7 G/DL (ref 12–15.9)
HGB BLD-MCNC: 7.8 G/DL (ref 12–15.9)
HGB BLD-MCNC: 7.8 G/DL (ref 12–15.9)
HGB BLD-MCNC: 7.9 G/DL (ref 12–15.9)
HGB BLD-MCNC: 8 G/DL (ref 12–15.9)
HGB BLD-MCNC: 8 G/DL (ref 12–15.9)
HGB BLD-MCNC: 8.1 G/DL (ref 12–15.9)
HGB BLD-MCNC: 8.1 G/DL (ref 12–15.9)
HGB BLD-MCNC: 8.2 G/DL (ref 12–15.9)
HGB BLD-MCNC: 8.3 G/DL (ref 12–15.9)
HGB BLD-MCNC: 8.4 G/DL (ref 12–15.9)
HGB BLD-MCNC: 8.5 G/DL (ref 12–15.9)
HGB BLD-MCNC: 8.5 G/DL (ref 12–15.9)
HGB BLD-MCNC: 8.7 G/DL (ref 12–15.9)
HGB BLD-MCNC: 9.2 G/DL (ref 12–15.9)
HGB BLDA-MCNC: 10.1 G/DL (ref 14–18)
HGB BLDA-MCNC: 10.4 G/DL (ref 14–18)
HGB BLDA-MCNC: 10.5 G/DL (ref 14–18)
HGB BLDA-MCNC: 5.8 G/DL (ref 12–17)
HGB BLDA-MCNC: 6.8 G/DL (ref 12–17)
HGB BLDA-MCNC: 6.9 G/DL (ref 14–18)
HGB BLDA-MCNC: 7.7 G/DL (ref 14–18)
HGB BLDA-MCNC: 7.7 G/DL (ref 14–18)
HGB BLDA-MCNC: 7.8 G/DL (ref 12–17)
HGB BLDA-MCNC: 7.9 G/DL (ref 14–18)
HGB BLDA-MCNC: 7.9 G/DL (ref 14–18)
HGB BLDA-MCNC: 8 G/DL (ref 14–18)
HGB BLDA-MCNC: 8.1 G/DL (ref 14–18)
HGB BLDA-MCNC: 8.3 G/DL (ref 14–18)
HGB BLDA-MCNC: 8.7 G/DL (ref 14–18)
HGB BLDA-MCNC: 8.8 G/DL (ref 14–18)
HGB BLDA-MCNC: 8.8 G/DL (ref 14–18)
IMM GRANULOCYTES # BLD AUTO: 0.04 10*3/MM3 (ref 0–0.05)
IMM GRANULOCYTES # BLD AUTO: 0.1 10*3/MM3 (ref 0–0.05)
IMM GRANULOCYTES # BLD AUTO: 0.12 10*3/MM3 (ref 0–0.05)
IMM GRANULOCYTES # BLD AUTO: 0.13 10*3/MM3 (ref 0–0.05)
IMM GRANULOCYTES NFR BLD AUTO: 0.4 % (ref 0–0.5)
IMM GRANULOCYTES NFR BLD AUTO: 1 % (ref 0–0.5)
IMM GRANULOCYTES NFR BLD AUTO: 1.1 % (ref 0–0.5)
IMM GRANULOCYTES NFR BLD AUTO: 2.2 % (ref 0–0.5)
INHALED O2 CONCENTRATION: 100 %
INHALED O2 CONCENTRATION: 40 %
INHALED O2 CONCENTRATION: 55 %
INHALED O2 CONCENTRATION: 60 %
INHALED O2 CONCENTRATION: 65 %
INHALED O2 CONCENTRATION: 65 %
INHALED O2 CONCENTRATION: 75 %
INHALED O2 CONCENTRATION: 80 %
INR PPP: 1.42 (ref 0.89–1.12)
IPAP: 0
LYMPHOCYTES # BLD AUTO: 0.27 10*3/MM3 (ref 0.7–3.1)
LYMPHOCYTES # BLD AUTO: 0.72 10*3/MM3 (ref 0.7–3.1)
LYMPHOCYTES # BLD AUTO: 1.02 10*3/MM3 (ref 0.7–3.1)
LYMPHOCYTES # BLD AUTO: 1.23 10*3/MM3 (ref 0.7–3.1)
LYMPHOCYTES NFR BLD AUTO: 12.6 % (ref 19.6–45.3)
LYMPHOCYTES NFR BLD AUTO: 4.5 % (ref 19.6–45.3)
LYMPHOCYTES NFR BLD AUTO: 6.6 % (ref 19.6–45.3)
LYMPHOCYTES NFR BLD AUTO: 9.7 % (ref 19.6–45.3)
Lab: ABNORMAL
Lab: ABNORMAL
MAGNESIUM SERPL-MCNC: 1.6 MG/DL (ref 1.6–2.4)
MAGNESIUM SERPL-MCNC: 1.9 MG/DL (ref 1.6–2.4)
MAGNESIUM SERPL-MCNC: 2 MG/DL (ref 1.6–2.4)
MAGNESIUM SERPL-MCNC: 2.2 MG/DL (ref 1.6–2.4)
MAGNESIUM SERPL-MCNC: 2.3 MG/DL (ref 1.6–2.4)
MAGNESIUM SERPL-MCNC: 2.4 MG/DL (ref 1.6–2.4)
MAGNESIUM SERPL-MCNC: 2.5 MG/DL (ref 1.6–2.4)
MAGNESIUM SERPL-MCNC: 2.5 MG/DL (ref 1.6–2.4)
MAGNESIUM SERPL-MCNC: 2.8 MG/DL (ref 1.6–2.4)
MCH RBC QN AUTO: 27.2 PG (ref 26.6–33)
MCH RBC QN AUTO: 27.5 PG (ref 26.6–33)
MCH RBC QN AUTO: 27.8 PG (ref 26.6–33)
MCH RBC QN AUTO: 27.9 PG (ref 26.6–33)
MCH RBC QN AUTO: 27.9 PG (ref 26.6–33)
MCH RBC QN AUTO: 28 PG (ref 26.6–33)
MCH RBC QN AUTO: 28.1 PG (ref 26.6–33)
MCH RBC QN AUTO: 28.1 PG (ref 26.6–33)
MCH RBC QN AUTO: 28.3 PG (ref 26.6–33)
MCH RBC QN AUTO: 28.5 PG (ref 26.6–33)
MCH RBC QN AUTO: 28.6 PG (ref 26.6–33)
MCH RBC QN AUTO: 28.7 PG (ref 26.6–33)
MCH RBC QN AUTO: 28.8 PG (ref 26.6–33)
MCH RBC QN AUTO: 28.8 PG (ref 26.6–33)
MCH RBC QN AUTO: 28.9 PG (ref 26.6–33)
MCH RBC QN AUTO: 29 PG (ref 26.6–33)
MCH RBC QN AUTO: 29 PG (ref 26.6–33)
MCH RBC QN AUTO: 29.1 PG (ref 26.6–33)
MCH RBC QN AUTO: 29.2 PG (ref 26.6–33)
MCH RBC QN AUTO: 29.2 PG (ref 26.6–33)
MCH RBC QN AUTO: 29.3 PG (ref 26.6–33)
MCH RBC QN AUTO: 29.4 PG (ref 26.6–33)
MCH RBC QN AUTO: 29.5 PG (ref 26.6–33)
MCH RBC QN AUTO: 29.6 PG (ref 26.6–33)
MCH RBC QN AUTO: 29.7 PG (ref 26.6–33)
MCH RBC QN AUTO: 29.9 PG (ref 26.6–33)
MCH RBC QN AUTO: 29.9 PG (ref 26.6–33)
MCH RBC QN AUTO: 30.4 PG (ref 26.6–33)
MCH RBC QN AUTO: 30.6 PG (ref 26.6–33)
MCH RBC QN AUTO: 30.7 PG (ref 26.6–33)
MCH RBC QN AUTO: 31 PG (ref 26.6–33)
MCH RBC QN AUTO: 31.5 PG (ref 26.6–33)
MCH RBC QN AUTO: 31.9 PG (ref 26.6–33)
MCH RBC QN AUTO: 32.5 PG (ref 26.6–33)
MCHC RBC AUTO-ENTMCNC: 30.4 G/DL (ref 31.5–35.7)
MCHC RBC AUTO-ENTMCNC: 30.7 G/DL (ref 31.5–35.7)
MCHC RBC AUTO-ENTMCNC: 30.9 G/DL (ref 31.5–35.7)
MCHC RBC AUTO-ENTMCNC: 30.9 G/DL (ref 31.5–35.7)
MCHC RBC AUTO-ENTMCNC: 31 G/DL (ref 31.5–35.7)
MCHC RBC AUTO-ENTMCNC: 31 G/DL (ref 31.5–35.7)
MCHC RBC AUTO-ENTMCNC: 31.1 G/DL (ref 31.5–35.7)
MCHC RBC AUTO-ENTMCNC: 31.3 G/DL (ref 31.5–35.7)
MCHC RBC AUTO-ENTMCNC: 31.4 G/DL (ref 31.5–35.7)
MCHC RBC AUTO-ENTMCNC: 31.6 G/DL (ref 31.5–35.7)
MCHC RBC AUTO-ENTMCNC: 31.6 G/DL (ref 31.5–35.7)
MCHC RBC AUTO-ENTMCNC: 31.8 G/DL (ref 31.5–35.7)
MCHC RBC AUTO-ENTMCNC: 31.9 G/DL (ref 31.5–35.7)
MCHC RBC AUTO-ENTMCNC: 32 G/DL (ref 31.5–35.7)
MCHC RBC AUTO-ENTMCNC: 32.5 G/DL (ref 31.5–35.7)
MCHC RBC AUTO-ENTMCNC: 32.6 G/DL (ref 31.5–35.7)
MCHC RBC AUTO-ENTMCNC: 33.1 G/DL (ref 31.5–35.7)
MCHC RBC AUTO-ENTMCNC: 33.2 G/DL (ref 31.5–35.7)
MCHC RBC AUTO-ENTMCNC: 33.5 G/DL (ref 31.5–35.7)
MCHC RBC AUTO-ENTMCNC: 33.9 G/DL (ref 31.5–35.7)
MCHC RBC AUTO-ENTMCNC: 34 G/DL (ref 31.5–35.7)
MCHC RBC AUTO-ENTMCNC: 34.5 G/DL (ref 31.5–35.7)
MCHC RBC AUTO-ENTMCNC: 34.6 G/DL (ref 31.5–35.7)
MCHC RBC AUTO-ENTMCNC: 34.8 G/DL (ref 31.5–35.7)
MCHC RBC AUTO-ENTMCNC: 35.3 G/DL (ref 31.5–35.7)
MCHC RBC AUTO-ENTMCNC: 35.9 G/DL (ref 31.5–35.7)
MCHC RBC AUTO-ENTMCNC: 35.9 G/DL (ref 31.5–35.7)
MCHC RBC AUTO-ENTMCNC: 36 G/DL (ref 31.5–35.7)
MCHC RBC AUTO-ENTMCNC: 36.4 G/DL (ref 31.5–35.7)
MCHC RBC AUTO-ENTMCNC: 36.6 G/DL (ref 31.5–35.7)
MCHC RBC AUTO-ENTMCNC: 36.8 G/DL (ref 31.5–35.7)
MCHC RBC AUTO-ENTMCNC: 37.4 G/DL (ref 31.5–35.7)
MCHC RBC AUTO-ENTMCNC: 37.5 G/DL (ref 31.5–35.7)
MCHC RBC AUTO-ENTMCNC: 37.6 G/DL (ref 31.5–35.7)
MCV RBC AUTO: 80.5 FL (ref 79–97)
MCV RBC AUTO: 81.5 FL (ref 79–97)
MCV RBC AUTO: 81.7 FL (ref 79–97)
MCV RBC AUTO: 82.9 FL (ref 79–97)
MCV RBC AUTO: 84 FL (ref 79–97)
MCV RBC AUTO: 84.8 FL (ref 79–97)
MCV RBC AUTO: 85 FL (ref 79–97)
MCV RBC AUTO: 85.4 FL (ref 79–97)
MCV RBC AUTO: 85.8 FL (ref 79–97)
MCV RBC AUTO: 86 FL (ref 79–97)
MCV RBC AUTO: 86.3 FL (ref 79–97)
MCV RBC AUTO: 86.5 FL (ref 79–97)
MCV RBC AUTO: 86.9 FL (ref 79–97)
MCV RBC AUTO: 87 FL (ref 79–97)
MCV RBC AUTO: 87.7 FL (ref 79–97)
MCV RBC AUTO: 87.8 FL (ref 79–97)
MCV RBC AUTO: 87.8 FL (ref 79–97)
MCV RBC AUTO: 88 FL (ref 79–97)
MCV RBC AUTO: 88.2 FL (ref 79–97)
MCV RBC AUTO: 88.3 FL (ref 79–97)
MCV RBC AUTO: 88.6 FL (ref 79–97)
MCV RBC AUTO: 89.6 FL (ref 79–97)
MCV RBC AUTO: 90.1 FL (ref 79–97)
MCV RBC AUTO: 90.2 FL (ref 79–97)
MCV RBC AUTO: 90.3 FL (ref 79–97)
MCV RBC AUTO: 90.4 FL (ref 79–97)
MCV RBC AUTO: 90.5 FL (ref 79–97)
MCV RBC AUTO: 90.6 FL (ref 79–97)
MCV RBC AUTO: 90.7 FL (ref 79–97)
MCV RBC AUTO: 90.7 FL (ref 79–97)
MCV RBC AUTO: 90.9 FL (ref 79–97)
MCV RBC AUTO: 90.9 FL (ref 79–97)
MCV RBC AUTO: 91 FL (ref 79–97)
MCV RBC AUTO: 91.3 FL (ref 79–97)
MCV RBC AUTO: 91.6 FL (ref 79–97)
MCV RBC AUTO: 91.7 FL (ref 79–97)
METHGB BLD QL: 0.4 % (ref 0–1.5)
METHGB BLD QL: 0.5 % (ref 0–1.5)
METHGB BLD QL: 0.6 % (ref 0–1.5)
METHGB BLD QL: 0.6 % (ref 0–1.5)
METHGB BLD QL: 0.7 % (ref 0–1.5)
METHGB BLD QL: 0.8 % (ref 0–1.5)
METHGB BLD QL: 0.8 % (ref 0–1.5)
METHGB BLD QL: 0.9 % (ref 0–1.5)
METHGB BLD QL: 1.1 % (ref 0–1.5)
METHGB BLD QL: 1.2 % (ref 0–1.5)
METHGB BLD QL: 2.4 % (ref 0–1.5)
MODALITY: ABNORMAL
MONOCYTES # BLD AUTO: 0.16 10*3/MM3 (ref 0.1–0.9)
MONOCYTES # BLD AUTO: 0.33 10*3/MM3 (ref 0.1–0.9)
MONOCYTES # BLD AUTO: 0.46 10*3/MM3 (ref 0.1–0.9)
MONOCYTES # BLD AUTO: 0.6 10*3/MM3 (ref 0.1–0.9)
MONOCYTES NFR BLD AUTO: 1.6 % (ref 5–12)
MONOCYTES NFR BLD AUTO: 4.4 % (ref 5–12)
MONOCYTES NFR BLD AUTO: 5.5 % (ref 5–12)
MONOCYTES NFR BLD AUTO: 5.5 % (ref 5–12)
NEUTROPHILS NFR BLD AUTO: 5.13 10*3/MM3 (ref 1.7–7)
NEUTROPHILS NFR BLD AUTO: 8.26 10*3/MM3 (ref 1.7–7)
NEUTROPHILS NFR BLD AUTO: 8.91 10*3/MM3 (ref 1.7–7)
NEUTROPHILS NFR BLD AUTO: 84.5 % (ref 42.7–76)
NEUTROPHILS NFR BLD AUTO: 84.6 % (ref 42.7–76)
NEUTROPHILS NFR BLD AUTO: 86.1 % (ref 42.7–76)
NEUTROPHILS NFR BLD AUTO: 87.2 % (ref 42.7–76)
NEUTROPHILS NFR BLD AUTO: 9.59 10*3/MM3 (ref 1.7–7)
NOTIFIED BY: ABNORMAL
NOTIFIED BY: ABNORMAL
NOTIFIED WHO: ABNORMAL
NOTIFIED WHO: ABNORMAL
NRBC BLD AUTO-RTO: 0 /100 WBC (ref 0–0.2)
NRBC BLD AUTO-RTO: 0 /100 WBC (ref 0–0.2)
NRBC BLD AUTO-RTO: 0.3 /100 WBC (ref 0–0.2)
NRBC BLD AUTO-RTO: 0.5 /100 WBC (ref 0–0.2)
OXYHGB MFR BLDV: 85.3 % (ref 94–99)
OXYHGB MFR BLDV: 85.4 % (ref 94–99)
OXYHGB MFR BLDV: 88.1 % (ref 94–99)
OXYHGB MFR BLDV: 89.5 % (ref 94–99)
OXYHGB MFR BLDV: 91.1 % (ref 94–99)
OXYHGB MFR BLDV: 91.2 % (ref 94–99)
OXYHGB MFR BLDV: 91.4 % (ref 94–99)
OXYHGB MFR BLDV: 91.8 % (ref 94–99)
OXYHGB MFR BLDV: 92.5 % (ref 94–99)
OXYHGB MFR BLDV: 92.5 % (ref 94–99)
OXYHGB MFR BLDV: 92.8 % (ref 94–99)
OXYHGB MFR BLDV: 93.8 % (ref 94–99)
OXYHGB MFR BLDV: 94.2 % (ref 94–99)
OXYHGB MFR BLDV: 98.4 % (ref 94–99)
PAW @ PEAK INSP FLOW SETTING VENT: 0 CMH2O
PAW @ PEAK INSP FLOW SETTING VENT: 37 CMH2O
PAW @ PEAK INSP FLOW SETTING VENT: 44 CMH2O
PCO2 BLDA: 32 MM HG (ref 35–45)
PCO2 BLDA: 32.1 MM HG (ref 35–45)
PCO2 BLDA: 35.1 MM HG (ref 35–45)
PCO2 BLDA: 35.4 MM HG (ref 35–45)
PCO2 BLDA: 35.6 MM HG (ref 35–45)
PCO2 BLDA: 35.8 MM HG (ref 35–45)
PCO2 BLDA: 37.4 MM HG (ref 35–45)
PCO2 BLDA: 37.5 MM HG (ref 35–45)
PCO2 BLDA: 42.2 MM HG (ref 35–45)
PCO2 BLDA: 47.6 MM HG (ref 35–45)
PCO2 BLDA: 47.7 MM HG (ref 35–45)
PCO2 BLDA: 48.7 MM HG (ref 35–45)
PCO2 BLDA: 50.6 MM HG (ref 35–45)
PCO2 BLDA: 50.8 MM HG (ref 35–45)
PCO2 BLDA: 51 MM HG (ref 35–45)
PCO2 BLDA: 51.1 MM HG (ref 35–45)
PCO2 BLDA: 51.3 MM HG (ref 35–45)
PCO2 TEMP ADJ BLD: 32 MM HG (ref 35–45)
PCO2 TEMP ADJ BLD: 32.1 MM HG (ref 35–45)
PCO2 TEMP ADJ BLD: 35.1 MM HG (ref 35–45)
PCO2 TEMP ADJ BLD: 35.4 MM HG (ref 35–45)
PCO2 TEMP ADJ BLD: 35.6 MM HG (ref 35–45)
PCO2 TEMP ADJ BLD: 35.8 MM HG (ref 35–45)
PCO2 TEMP ADJ BLD: 37.5 MM HG (ref 35–45)
PCO2 TEMP ADJ BLD: 42.2 MM HG (ref 35–45)
PCO2 TEMP ADJ BLD: 47.6 MM HG (ref 35–45)
PCO2 TEMP ADJ BLD: 47.7 MM HG (ref 35–45)
PCO2 TEMP ADJ BLD: 50.8 MM HG (ref 35–45)
PCO2 TEMP ADJ BLD: 51 MM HG (ref 35–45)
PCO2 TEMP ADJ BLD: 51.1 MM HG (ref 35–45)
PCO2 TEMP ADJ BLD: 51.3 MM HG (ref 35–45)
PEEP RESPIRATORY: 10 CM[H2O]
PEEP RESPIRATORY: 12 CM[H2O]
PEEP RESPIRATORY: 14 CM[H2O]
PEEP RESPIRATORY: 5 CM[H2O]
PH BLDA: 7.02 PH UNITS (ref 7.35–7.6)
PH BLDA: 7.21 PH UNITS (ref 7.35–7.45)
PH BLDA: 7.21 PH UNITS (ref 7.35–7.45)
PH BLDA: 7.21 PH UNITS (ref 7.35–7.6)
PH BLDA: 7.22 PH UNITS (ref 7.35–7.45)
PH BLDA: 7.23 PH UNITS (ref 7.35–7.45)
PH BLDA: 7.24 PH UNITS (ref 7.35–7.45)
PH BLDA: 7.24 PH UNITS (ref 7.35–7.45)
PH BLDA: 7.26 PH UNITS (ref 7.35–7.45)
PH BLDA: 7.28 PH UNITS (ref 7.35–7.45)
PH BLDA: 7.31 PH UNITS (ref 7.35–7.6)
PH BLDA: 7.32 PH UNITS (ref 7.35–7.45)
PH BLDA: 7.37 PH UNITS (ref 7.35–7.45)
PH BLDA: 7.39 PH UNITS (ref 7.35–7.45)
PH BLDA: 7.4 PH UNITS (ref 7.35–7.45)
PH BLDA: 7.43 PH UNITS (ref 7.35–7.45)
PH BLDA: 7.47 PH UNITS (ref 7.35–7.45)
PH, TEMP CORRECTED: 7.21 PH UNITS
PH, TEMP CORRECTED: 7.21 PH UNITS
PH, TEMP CORRECTED: 7.22 PH UNITS
PH, TEMP CORRECTED: 7.23 PH UNITS
PH, TEMP CORRECTED: 7.24 PH UNITS
PH, TEMP CORRECTED: 7.24 PH UNITS
PH, TEMP CORRECTED: 7.26 PH UNITS
PH, TEMP CORRECTED: 7.28 PH UNITS
PH, TEMP CORRECTED: 7.32 PH UNITS
PH, TEMP CORRECTED: 7.37 PH UNITS
PH, TEMP CORRECTED: 7.39 PH UNITS
PH, TEMP CORRECTED: 7.4 PH UNITS
PH, TEMP CORRECTED: 7.43 PH UNITS
PH, TEMP CORRECTED: 7.47 PH UNITS
PHOSPHATE SERPL-MCNC: 4.3 MG/DL (ref 2.5–4.5)
PHOSPHATE SERPL-MCNC: 4.4 MG/DL (ref 2.5–4.5)
PHOSPHATE SERPL-MCNC: 4.5 MG/DL (ref 2.5–4.5)
PHOSPHATE SERPL-MCNC: 4.7 MG/DL (ref 2.5–4.5)
PHOSPHATE SERPL-MCNC: 4.8 MG/DL (ref 2.5–4.5)
PHOSPHATE SERPL-MCNC: 5 MG/DL (ref 2.5–4.5)
PHOSPHATE SERPL-MCNC: 5.4 MG/DL (ref 2.5–4.5)
PHOSPHATE SERPL-MCNC: 6.4 MG/DL (ref 2.5–4.5)
PHOSPHATE SERPL-MCNC: 6.7 MG/DL (ref 2.5–4.5)
PHOSPHATE SERPL-MCNC: 6.9 MG/DL (ref 2.5–4.5)
PHOSPHATE SERPL-MCNC: 9.1 MG/DL (ref 2.5–4.5)
PLAT MORPH BLD: NORMAL
PLATELET # BLD AUTO: 118 10*3/MM3 (ref 140–450)
PLATELET # BLD AUTO: 125 10*3/MM3 (ref 140–450)
PLATELET # BLD AUTO: 125 10*3/MM3 (ref 140–450)
PLATELET # BLD AUTO: 127 10*3/MM3 (ref 140–450)
PLATELET # BLD AUTO: 128 10*3/MM3 (ref 140–450)
PLATELET # BLD AUTO: 128 10*3/MM3 (ref 140–450)
PLATELET # BLD AUTO: 129 10*3/MM3 (ref 140–450)
PLATELET # BLD AUTO: 131 10*3/MM3 (ref 140–450)
PLATELET # BLD AUTO: 138 10*3/MM3 (ref 140–450)
PLATELET # BLD AUTO: 138 10*3/MM3 (ref 140–450)
PLATELET # BLD AUTO: 142 10*3/MM3 (ref 140–450)
PLATELET # BLD AUTO: 143 10*3/MM3 (ref 140–450)
PLATELET # BLD AUTO: 144 10*3/MM3 (ref 140–450)
PLATELET # BLD AUTO: 146 10*3/MM3 (ref 140–450)
PLATELET # BLD AUTO: 146 10*3/MM3 (ref 140–450)
PLATELET # BLD AUTO: 149 10*3/MM3 (ref 140–450)
PLATELET # BLD AUTO: 157 10*3/MM3 (ref 140–450)
PLATELET # BLD AUTO: 172 10*3/MM3 (ref 140–450)
PLATELET # BLD AUTO: 210 10*3/MM3 (ref 140–450)
PLATELET # BLD AUTO: 74 10*3/MM3 (ref 140–450)
PLATELET # BLD AUTO: 78 10*3/MM3 (ref 140–450)
PLATELET # BLD AUTO: 79 10*3/MM3 (ref 140–450)
PLATELET # BLD AUTO: 79 10*3/MM3 (ref 140–450)
PLATELET # BLD AUTO: 80 10*3/MM3 (ref 140–450)
PLATELET # BLD AUTO: 81 10*3/MM3 (ref 140–450)
PLATELET # BLD AUTO: 81 10*3/MM3 (ref 140–450)
PLATELET # BLD AUTO: 82 10*3/MM3 (ref 140–450)
PLATELET # BLD AUTO: 82 10*3/MM3 (ref 140–450)
PLATELET # BLD AUTO: 84 10*3/MM3 (ref 140–450)
PLATELET # BLD AUTO: 84 10*3/MM3 (ref 140–450)
PLATELET # BLD AUTO: 85 10*3/MM3 (ref 140–450)
PLATELET # BLD AUTO: 85 10*3/MM3 (ref 140–450)
PLATELET # BLD AUTO: 86 10*3/MM3 (ref 140–450)
PLATELET # BLD AUTO: 89 10*3/MM3 (ref 140–450)
PLATELET # BLD AUTO: 94 10*3/MM3 (ref 140–450)
PLATELET # BLD AUTO: 97 10*3/MM3 (ref 140–450)
PLATELET # BLD AUTO: 98 10*3/MM3 (ref 140–450)
PLATELET # BLD AUTO: 99 10*3/MM3 (ref 140–450)
PMV BLD AUTO: 10.5 FL (ref 6–12)
PMV BLD AUTO: 10.5 FL (ref 6–12)
PMV BLD AUTO: 10.8 FL (ref 6–12)
PMV BLD AUTO: 11 FL (ref 6–12)
PMV BLD AUTO: 11.1 FL (ref 6–12)
PMV BLD AUTO: 11.2 FL (ref 6–12)
PMV BLD AUTO: 11.3 FL (ref 6–12)
PMV BLD AUTO: 11.4 FL (ref 6–12)
PMV BLD AUTO: 11.5 FL (ref 6–12)
PMV BLD AUTO: 11.6 FL (ref 6–12)
PMV BLD AUTO: 11.7 FL (ref 6–12)
PMV BLD AUTO: 11.8 FL (ref 6–12)
PMV BLD AUTO: 11.8 FL (ref 6–12)
PMV BLD AUTO: 11.9 FL (ref 6–12)
PMV BLD AUTO: 12.1 FL (ref 6–12)
PMV BLD AUTO: 12.7 FL (ref 6–12)
PO2 BLDA: 298 MM HG (ref 83–108)
PO2 BLDA: 323 MMHG (ref 80–105)
PO2 BLDA: 339 MMHG (ref 80–105)
PO2 BLDA: 433 MMHG (ref 80–105)
PO2 BLDA: 48.9 MM HG (ref 83–108)
PO2 BLDA: 55.5 MM HG (ref 83–108)
PO2 BLDA: 61.1 MM HG (ref 83–108)
PO2 BLDA: 65.6 MM HG (ref 83–108)
PO2 BLDA: 66 MM HG (ref 83–108)
PO2 BLDA: 68.5 MM HG (ref 83–108)
PO2 BLDA: 68.9 MM HG (ref 83–108)
PO2 BLDA: 69.7 MM HG (ref 83–108)
PO2 BLDA: 71.2 MM HG (ref 83–108)
PO2 BLDA: 72.5 MM HG (ref 83–108)
PO2 BLDA: 73.2 MM HG (ref 83–108)
PO2 BLDA: 74.8 MM HG (ref 83–108)
PO2 BLDA: 78 MM HG (ref 83–108)
PO2 TEMP ADJ BLD: 298 MM HG (ref 83–108)
PO2 TEMP ADJ BLD: 48.9 MM HG (ref 83–108)
PO2 TEMP ADJ BLD: 55.5 MM HG (ref 83–108)
PO2 TEMP ADJ BLD: 61.1 MM HG (ref 83–108)
PO2 TEMP ADJ BLD: 65.6 MM HG (ref 83–108)
PO2 TEMP ADJ BLD: 66 MM HG (ref 83–108)
PO2 TEMP ADJ BLD: 68.5 MM HG (ref 83–108)
PO2 TEMP ADJ BLD: 68.9 MM HG (ref 83–108)
PO2 TEMP ADJ BLD: 69.7 MM HG (ref 83–108)
PO2 TEMP ADJ BLD: 71.2 MM HG (ref 83–108)
PO2 TEMP ADJ BLD: 72.5 MM HG (ref 83–108)
PO2 TEMP ADJ BLD: 73.2 MM HG (ref 83–108)
PO2 TEMP ADJ BLD: 74.8 MM HG (ref 83–108)
PO2 TEMP ADJ BLD: 78 MM HG (ref 83–108)
POTASSIUM BLDA-SCNC: 4.1 MMOL/L (ref 3.5–4.9)
POTASSIUM BLDA-SCNC: 4.5 MMOL/L (ref 3.5–4.9)
POTASSIUM BLDA-SCNC: 4.9 MMOL/L (ref 3.5–4.9)
POTASSIUM SERPL-SCNC: 3 MMOL/L (ref 3.5–5.2)
POTASSIUM SERPL-SCNC: 3 MMOL/L (ref 3.5–5.2)
POTASSIUM SERPL-SCNC: 3.6 MMOL/L (ref 3.5–5.2)
POTASSIUM SERPL-SCNC: 4 MMOL/L (ref 3.5–5.2)
POTASSIUM SERPL-SCNC: 4 MMOL/L (ref 3.5–5.2)
POTASSIUM SERPL-SCNC: 4.1 MMOL/L (ref 3.5–5.2)
POTASSIUM SERPL-SCNC: 4.2 MMOL/L (ref 3.5–5.2)
POTASSIUM SERPL-SCNC: 4.2 MMOL/L (ref 3.5–5.2)
POTASSIUM SERPL-SCNC: 4.3 MMOL/L (ref 3.5–5.2)
POTASSIUM SERPL-SCNC: 4.4 MMOL/L (ref 3.5–5.2)
POTASSIUM SERPL-SCNC: 4.5 MMOL/L (ref 3.5–5.2)
POTASSIUM SERPL-SCNC: 4.6 MMOL/L (ref 3.5–5.2)
POTASSIUM SERPL-SCNC: 4.7 MMOL/L (ref 3.5–5.2)
POTASSIUM SERPL-SCNC: 4.8 MMOL/L (ref 3.5–5.2)
POTASSIUM SERPL-SCNC: 4.9 MMOL/L (ref 3.5–5.2)
POTASSIUM SERPL-SCNC: 5 MMOL/L (ref 3.5–5.2)
POTASSIUM SERPL-SCNC: 5.1 MMOL/L (ref 3.5–5.2)
POTASSIUM SERPL-SCNC: 5.2 MMOL/L (ref 3.5–5.2)
POTASSIUM SERPL-SCNC: 5.2 MMOL/L (ref 3.5–5.2)
POTASSIUM SERPL-SCNC: 5.4 MMOL/L (ref 3.5–5.2)
POTASSIUM SERPL-SCNC: 5.5 MMOL/L (ref 3.5–5.2)
POTASSIUM SERPL-SCNC: 5.6 MMOL/L (ref 3.5–5.2)
POTASSIUM SERPL-SCNC: 5.6 MMOL/L (ref 3.5–5.2)
POTASSIUM SERPL-SCNC: 5.8 MMOL/L (ref 3.5–5.2)
PREALB SERPL-MCNC: 11.1 MG/DL (ref 20–40)
PROT SERPL-MCNC: 5.6 G/DL (ref 6–8.5)
PROT SERPL-MCNC: 5.8 G/DL (ref 6–8.5)
PROT SERPL-MCNC: 6.4 G/DL (ref 6–8.5)
PROTHROMBIN TIME: 17.4 SECONDS (ref 12.2–14.5)
QT INTERVAL: 404 MS
QT INTERVAL: 432 MS
QTC INTERVAL: 486 MS
QTC INTERVAL: 510 MS
RBC # BLD AUTO: 2.28 10*6/MM3 (ref 3.77–5.28)
RBC # BLD AUTO: 2.34 10*6/MM3 (ref 3.77–5.28)
RBC # BLD AUTO: 2.35 10*6/MM3 (ref 3.77–5.28)
RBC # BLD AUTO: 2.35 10*6/MM3 (ref 3.77–5.28)
RBC # BLD AUTO: 2.37 10*6/MM3 (ref 3.77–5.28)
RBC # BLD AUTO: 2.54 10*6/MM3 (ref 3.77–5.28)
RBC # BLD AUTO: 2.54 10*6/MM3 (ref 3.77–5.28)
RBC # BLD AUTO: 2.55 10*6/MM3 (ref 3.77–5.28)
RBC # BLD AUTO: 2.57 10*6/MM3 (ref 3.77–5.28)
RBC # BLD AUTO: 2.58 10*6/MM3 (ref 3.77–5.28)
RBC # BLD AUTO: 2.6 10*6/MM3 (ref 3.77–5.28)
RBC # BLD AUTO: 2.6 10*6/MM3 (ref 3.77–5.28)
RBC # BLD AUTO: 2.63 10*6/MM3 (ref 3.77–5.28)
RBC # BLD AUTO: 2.67 10*6/MM3 (ref 3.77–5.28)
RBC # BLD AUTO: 2.67 10*6/MM3 (ref 3.77–5.28)
RBC # BLD AUTO: 2.69 10*6/MM3 (ref 3.77–5.28)
RBC # BLD AUTO: 2.69 10*6/MM3 (ref 3.77–5.28)
RBC # BLD AUTO: 2.7 10*6/MM3 (ref 3.77–5.28)
RBC # BLD AUTO: 2.73 10*6/MM3 (ref 3.77–5.28)
RBC # BLD AUTO: 2.75 10*6/MM3 (ref 3.77–5.28)
RBC # BLD AUTO: 2.77 10*6/MM3 (ref 3.77–5.28)
RBC # BLD AUTO: 2.77 10*6/MM3 (ref 3.77–5.28)
RBC # BLD AUTO: 2.79 10*6/MM3 (ref 3.77–5.28)
RBC # BLD AUTO: 2.79 10*6/MM3 (ref 3.77–5.28)
RBC # BLD AUTO: 2.81 10*6/MM3 (ref 3.77–5.28)
RBC # BLD AUTO: 2.83 10*6/MM3 (ref 3.77–5.28)
RBC # BLD AUTO: 2.83 10*6/MM3 (ref 3.77–5.28)
RBC # BLD AUTO: 2.86 10*6/MM3 (ref 3.77–5.28)
RBC # BLD AUTO: 2.86 10*6/MM3 (ref 3.77–5.28)
RBC # BLD AUTO: 2.88 10*6/MM3 (ref 3.77–5.28)
RBC # BLD AUTO: 2.88 10*6/MM3 (ref 3.77–5.28)
RBC # BLD AUTO: 2.9 10*6/MM3 (ref 3.77–5.28)
RBC # BLD AUTO: 2.9 10*6/MM3 (ref 3.77–5.28)
RBC # BLD AUTO: 2.92 10*6/MM3 (ref 3.77–5.28)
RBC # BLD AUTO: 2.96 10*6/MM3 (ref 3.77–5.28)
RBC # BLD AUTO: 2.98 10*6/MM3 (ref 3.77–5.28)
RBC # BLD AUTO: 3.14 10*6/MM3 (ref 3.77–5.28)
RBC # BLD AUTO: 3.38 10*6/MM3 (ref 3.77–5.28)
RBC # BLD AUTO: 3.38 10*6/MM3 (ref 3.77–5.28)
RBC # BLD AUTO: 3.42 10*6/MM3 (ref 3.77–5.28)
RH BLD: NEGATIVE
SAO2 % BLDA: 100 % (ref 95–98)
SODIUM BLD-SCNC: 139 MMOL/L (ref 138–146)
SODIUM BLD-SCNC: 147 MMOL/L (ref 138–146)
SODIUM BLD-SCNC: 148 MMOL/L (ref 138–146)
SODIUM SERPL-SCNC: 130 MMOL/L (ref 136–145)
SODIUM SERPL-SCNC: 130 MMOL/L (ref 136–145)
SODIUM SERPL-SCNC: 131 MMOL/L (ref 136–145)
SODIUM SERPL-SCNC: 132 MMOL/L (ref 136–145)
SODIUM SERPL-SCNC: 133 MMOL/L (ref 136–145)
SODIUM SERPL-SCNC: 134 MMOL/L (ref 136–145)
SODIUM SERPL-SCNC: 135 MMOL/L (ref 136–145)
SODIUM SERPL-SCNC: 136 MMOL/L (ref 136–145)
SODIUM SERPL-SCNC: 137 MMOL/L (ref 136–145)
SODIUM SERPL-SCNC: 138 MMOL/L (ref 136–145)
SODIUM SERPL-SCNC: 139 MMOL/L (ref 136–145)
SODIUM SERPL-SCNC: 140 MMOL/L (ref 136–145)
SODIUM SERPL-SCNC: 143 MMOL/L (ref 136–145)
SODIUM SERPL-SCNC: 143 MMOL/L (ref 136–145)
SODIUM SERPL-SCNC: 146 MMOL/L (ref 136–145)
SODIUM SERPL-SCNC: 146 MMOL/L (ref 136–145)
SODIUM SERPL-SCNC: 148 MMOL/L (ref 136–145)
SODIUM SERPL-SCNC: 148 MMOL/L (ref 136–145)
SODIUM SERPL-SCNC: 150 MMOL/L (ref 136–145)
SODIUM SERPL-SCNC: 151 MMOL/L (ref 136–145)
T&S EXPIRATION DATE: NORMAL
TOTAL RATE: 0 BREATHS/MINUTE
TOTAL RATE: 14 BREATHS/MINUTE
TOTAL RATE: 16 BREATHS/MINUTE
TOTAL RATE: 17 BREATHS/MINUTE
TOTAL RATE: 18 BREATHS/MINUTE
TRIGL SERPL-MCNC: 1236 MG/DL (ref 0–150)
TRIGL SERPL-MCNC: 1343 MG/DL (ref 0–150)
TRIGL SERPL-MCNC: 1454 MG/DL (ref 0–150)
TRIGL SERPL-MCNC: 679 MG/DL (ref 0–150)
UNIT  ABO: NORMAL
UNIT  RH: NORMAL
VENTILATOR MODE: ABNORMAL
VT ON VENT VENT: 0.41 ML
VT ON VENT VENT: 0.44 ML
VT ON VENT VENT: 0.44 ML
VT ON VENT VENT: 0.48 ML
WBC MORPH BLD: NORMAL
WBC NRBC COR # BLD AUTO: 10.2 10*3/MM3 (ref 3.4–10.8)
WBC NRBC COR # BLD AUTO: 10.53 10*3/MM3 (ref 3.4–10.8)
WBC NRBC COR # BLD AUTO: 10.76 10*3/MM3 (ref 3.4–10.8)
WBC NRBC COR # BLD AUTO: 10.83 10*3/MM3 (ref 3.4–10.8)
WBC NRBC COR # BLD AUTO: 10.95 10*3/MM3 (ref 3.4–10.8)
WBC NRBC COR # BLD AUTO: 10.98 10*3/MM3 (ref 3.4–10.8)
WBC NRBC COR # BLD AUTO: 11.27 10*3/MM3 (ref 3.4–10.8)
WBC NRBC COR # BLD AUTO: 12.06 10*3/MM3 (ref 3.4–10.8)
WBC NRBC COR # BLD AUTO: 13.45 10*3/MM3 (ref 3.4–10.8)
WBC NRBC COR # BLD AUTO: 14.87 10*3/MM3 (ref 3.4–10.8)
WBC NRBC COR # BLD AUTO: 15.24 10*3/MM3 (ref 3.4–10.8)
WBC NRBC COR # BLD AUTO: 16.36 10*3/MM3 (ref 3.4–10.8)
WBC NRBC COR # BLD AUTO: 16.85 10*3/MM3 (ref 3.4–10.8)
WBC NRBC COR # BLD AUTO: 17.29 10*3/MM3 (ref 3.4–10.8)
WBC NRBC COR # BLD AUTO: 20.96 10*3/MM3 (ref 3.4–10.8)
WBC NRBC COR # BLD AUTO: 21.77 10*3/MM3 (ref 3.4–10.8)
WBC NRBC COR # BLD AUTO: 24.01 10*3/MM3 (ref 3.4–10.8)
WBC NRBC COR # BLD AUTO: 26.69 10*3/MM3 (ref 3.4–10.8)
WBC NRBC COR # BLD AUTO: 5.96 10*3/MM3 (ref 3.4–10.8)
WBC NRBC COR # BLD AUTO: 6.02 10*3/MM3 (ref 3.4–10.8)
WBC NRBC COR # BLD AUTO: 6.49 10*3/MM3 (ref 3.4–10.8)
WBC NRBC COR # BLD AUTO: 6.59 10*3/MM3 (ref 3.4–10.8)
WBC NRBC COR # BLD AUTO: 6.59 10*3/MM3 (ref 3.4–10.8)
WBC NRBC COR # BLD AUTO: 6.74 10*3/MM3 (ref 3.4–10.8)
WBC NRBC COR # BLD AUTO: 6.76 10*3/MM3 (ref 3.4–10.8)
WBC NRBC COR # BLD AUTO: 6.77 10*3/MM3 (ref 3.4–10.8)
WBC NRBC COR # BLD AUTO: 6.89 10*3/MM3 (ref 3.4–10.8)
WBC NRBC COR # BLD AUTO: 6.97 10*3/MM3 (ref 3.4–10.8)
WBC NRBC COR # BLD AUTO: 7.18 10*3/MM3 (ref 3.4–10.8)
WBC NRBC COR # BLD AUTO: 7.29 10*3/MM3 (ref 3.4–10.8)
WBC NRBC COR # BLD AUTO: 7.87 10*3/MM3 (ref 3.4–10.8)
WBC NRBC COR # BLD AUTO: 7.98 10*3/MM3 (ref 3.4–10.8)
WBC NRBC COR # BLD AUTO: 8.07 10*3/MM3 (ref 3.4–10.8)
WBC NRBC COR # BLD AUTO: 8.31 10*3/MM3 (ref 3.4–10.8)
WBC NRBC COR # BLD AUTO: 8.31 10*3/MM3 (ref 3.4–10.8)
WBC NRBC COR # BLD AUTO: 8.32 10*3/MM3 (ref 3.4–10.8)
WBC NRBC COR # BLD AUTO: 8.98 10*3/MM3 (ref 3.4–10.8)
WBC NRBC COR # BLD AUTO: 9.45 10*3/MM3 (ref 3.4–10.8)
WBC NRBC COR # BLD AUTO: 9.78 10*3/MM3 (ref 3.4–10.8)
WBC NRBC COR # BLD AUTO: 9.84 10*3/MM3 (ref 3.4–10.8)

## 2024-01-01 PROCEDURE — 25010000002 HEPARIN (PORCINE) PER 1000 UNITS: Performed by: SURGERY

## 2024-01-01 PROCEDURE — 85027 COMPLETE CBC AUTOMATED: CPT | Performed by: INTERNAL MEDICINE

## 2024-01-01 PROCEDURE — 83735 ASSAY OF MAGNESIUM: CPT | Performed by: STUDENT IN AN ORGANIZED HEALTH CARE EDUCATION/TRAINING PROGRAM

## 2024-01-01 PROCEDURE — C1725 CATH, TRANSLUMIN NON-LASER: HCPCS | Performed by: SURGERY

## 2024-01-01 PROCEDURE — 25010000002 POTASSIUM CHLORIDE 10 MEQ/100ML SOLUTION: Performed by: NURSE PRACTITIONER

## 2024-01-01 PROCEDURE — 82550 ASSAY OF CK (CPK): CPT | Performed by: NURSE PRACTITIONER

## 2024-01-01 PROCEDURE — 87040 BLOOD CULTURE FOR BACTERIA: CPT | Performed by: INTERNAL MEDICINE

## 2024-01-01 PROCEDURE — 82803 BLOOD GASES ANY COMBINATION: CPT

## 2024-01-01 PROCEDURE — 80053 COMPREHEN METABOLIC PANEL: CPT | Performed by: INTERNAL MEDICINE

## 2024-01-01 PROCEDURE — 82948 REAGENT STRIP/BLOOD GLUCOSE: CPT

## 2024-01-01 PROCEDURE — 0 DIATRIZOATE MEGLUMINE & SODIUM PER 1 ML: Performed by: INTERNAL MEDICINE

## 2024-01-01 PROCEDURE — 84100 ASSAY OF PHOSPHORUS: CPT | Performed by: STUDENT IN AN ORGANIZED HEALTH CARE EDUCATION/TRAINING PROGRAM

## 2024-01-01 PROCEDURE — 83050 HGB METHEMOGLOBIN QUAN: CPT

## 2024-01-01 PROCEDURE — 94799 UNLISTED PULMONARY SVC/PX: CPT

## 2024-01-01 PROCEDURE — 36556 INSERT NON-TUNNEL CV CATH: CPT

## 2024-01-01 PROCEDURE — 02HV33Z INSERTION OF INFUSION DEVICE INTO SUPERIOR VENA CAVA, PERCUTANEOUS APPROACH: ICD-10-PCS

## 2024-01-01 PROCEDURE — 25010000002 GLYCOPYRROLATE 0.2 MG/ML SOLUTION

## 2024-01-01 PROCEDURE — P9047 ALBUMIN (HUMAN), 25%, 50ML: HCPCS | Performed by: INTERNAL MEDICINE

## 2024-01-01 PROCEDURE — 86900 BLOOD TYPING SEROLOGIC ABO: CPT

## 2024-01-01 PROCEDURE — C1894 INTRO/SHEATH, NON-LASER: HCPCS | Performed by: SURGERY

## 2024-01-01 PROCEDURE — 82375 ASSAY CARBOXYHB QUANT: CPT

## 2024-01-01 PROCEDURE — 99291 CRITICAL CARE FIRST HOUR: CPT | Performed by: INTERNAL MEDICINE

## 2024-01-01 PROCEDURE — 94668 MNPJ CHEST WALL SBSQ: CPT

## 2024-01-01 PROCEDURE — 25010000002 FENTANYL 10 MCG/1 ML NS: Performed by: INTERNAL MEDICINE

## 2024-01-01 PROCEDURE — C1752 CATH,HEMODIALYSIS,SHORT-TERM: HCPCS

## 2024-01-01 PROCEDURE — 25010000002 ENOXAPARIN PER 10 MG: Performed by: SURGERY

## 2024-01-01 PROCEDURE — C1887 CATHETER, GUIDING: HCPCS | Performed by: SURGERY

## 2024-01-01 PROCEDURE — 83735 ASSAY OF MAGNESIUM: CPT

## 2024-01-01 PROCEDURE — P9016 RBC LEUKOCYTES REDUCED: HCPCS

## 2024-01-01 PROCEDURE — 83605 ASSAY OF LACTIC ACID: CPT | Performed by: INTERNAL MEDICINE

## 2024-01-01 PROCEDURE — 25010000002 METOCLOPRAMIDE PER 10 MG: Performed by: SURGERY

## 2024-01-01 PROCEDURE — 99233 SBSQ HOSP IP/OBS HIGH 50: CPT | Performed by: INTERNAL MEDICINE

## 2024-01-01 PROCEDURE — C1874 STENT, COATED/COV W/DEL SYS: HCPCS | Performed by: SURGERY

## 2024-01-01 PROCEDURE — 80048 BASIC METABOLIC PNL TOTAL CA: CPT | Performed by: INTERNAL MEDICINE

## 2024-01-01 PROCEDURE — 25010000002 MIDAZOLAM PER 1 MG: Performed by: NURSE PRACTITIONER

## 2024-01-01 PROCEDURE — 84478 ASSAY OF TRIGLYCERIDES: CPT | Performed by: NURSE PRACTITIONER

## 2024-01-01 PROCEDURE — 86900 BLOOD TYPING SEROLOGIC ABO: CPT | Performed by: INTERNAL MEDICINE

## 2024-01-01 PROCEDURE — 25010000002 FUROSEMIDE PER 20 MG: Performed by: NURSE PRACTITIONER

## 2024-01-01 PROCEDURE — 25010000002 BUMETANIDE PER 0.5 MG: Performed by: STUDENT IN AN ORGANIZED HEALTH CARE EDUCATION/TRAINING PROGRAM

## 2024-01-01 PROCEDURE — 25010000002 ALBUMIN HUMAN 25% PER 50 ML: Performed by: INTERNAL MEDICINE

## 2024-01-01 PROCEDURE — C1889 IMPLANT/INSERT DEVICE, NOC: HCPCS | Performed by: SURGERY

## 2024-01-01 PROCEDURE — 25810000003 SODIUM CHLORIDE 0.9 % SOLUTION: Performed by: SURGERY

## 2024-01-01 PROCEDURE — 5A1D70Z PERFORMANCE OF URINARY FILTRATION, INTERMITTENT, LESS THAN 6 HOURS PER DAY: ICD-10-PCS | Performed by: STUDENT IN AN ORGANIZED HEALTH CARE EDUCATION/TRAINING PROGRAM

## 2024-01-01 PROCEDURE — 85384 FIBRINOGEN ACTIVITY: CPT | Performed by: SURGERY

## 2024-01-01 PROCEDURE — 25010000002 METHYLPREDNISOLONE PER 125 MG: Performed by: INTERNAL MEDICINE

## 2024-01-01 PROCEDURE — 25010000002 PROPOFOL 10 MG/ML EMULSION: Performed by: INTERNAL MEDICINE

## 2024-01-01 PROCEDURE — 85007 BL SMEAR W/DIFF WBC COUNT: CPT | Performed by: INTERNAL MEDICINE

## 2024-01-01 PROCEDURE — 93010 ELECTROCARDIOGRAM REPORT: CPT | Performed by: INTERNAL MEDICINE

## 2024-01-01 PROCEDURE — 82805 BLOOD GASES W/O2 SATURATION: CPT

## 2024-01-01 PROCEDURE — 80069 RENAL FUNCTION PANEL: CPT | Performed by: STUDENT IN AN ORGANIZED HEALTH CARE EDUCATION/TRAINING PROGRAM

## 2024-01-01 PROCEDURE — 82330 ASSAY OF CALCIUM: CPT | Performed by: STUDENT IN AN ORGANIZED HEALTH CARE EDUCATION/TRAINING PROGRAM

## 2024-01-01 PROCEDURE — 02HV33Z INSERTION OF INFUSION DEVICE INTO SUPERIOR VENA CAVA, PERCUTANEOUS APPROACH: ICD-10-PCS | Performed by: NURSE PRACTITIONER

## 2024-01-01 PROCEDURE — 25010000002 THIAMINE PER 100 MG: Performed by: NURSE PRACTITIONER

## 2024-01-01 PROCEDURE — 25010000002 LIDOCAINE 1 % SOLUTION: Performed by: SURGERY

## 2024-01-01 PROCEDURE — 87070 CULTURE OTHR SPECIMN AEROBIC: CPT | Performed by: INTERNAL MEDICINE

## 2024-01-01 PROCEDURE — 25010000002 NICARDIPINE 2.5 MG/ML SOLUTION 10 ML VIAL: Performed by: SURGERY

## 2024-01-01 PROCEDURE — 25510000001 IOPAMIDOL 61 % SOLUTION: Performed by: SURGERY

## 2024-01-01 PROCEDURE — 85025 COMPLETE CBC W/AUTO DIFF WBC: CPT | Performed by: SURGERY

## 2024-01-01 PROCEDURE — 85730 THROMBOPLASTIN TIME PARTIAL: CPT | Performed by: SURGERY

## 2024-01-01 PROCEDURE — 25010000002 POTASSIUM CHLORIDE PER 2 MEQ: Performed by: NURSE PRACTITIONER

## 2024-01-01 PROCEDURE — 84100 ASSAY OF PHOSPHORUS: CPT

## 2024-01-01 PROCEDURE — P9035 PLATELET PHERES LEUKOREDUCED: HCPCS

## 2024-01-01 PROCEDURE — 84478 ASSAY OF TRIGLYCERIDES: CPT

## 2024-01-01 PROCEDURE — 94761 N-INVAS EAR/PLS OXIMETRY MLT: CPT

## 2024-01-01 PROCEDURE — 36430 TRANSFUSION BLD/BLD COMPNT: CPT

## 2024-01-01 PROCEDURE — 82330 ASSAY OF CALCIUM: CPT

## 2024-01-01 PROCEDURE — 71045 X-RAY EXAM CHEST 1 VIEW: CPT

## 2024-01-01 PROCEDURE — 63710000001 INSULIN REGULAR HUMAN PER 5 UNITS: Performed by: NURSE PRACTITIONER

## 2024-01-01 PROCEDURE — 94003 VENT MGMT INPAT SUBQ DAY: CPT

## 2024-01-01 PROCEDURE — 25010000002 PIPERACILLIN SOD-TAZOBACTAM PER 1 G: Performed by: INTERNAL MEDICINE

## 2024-01-01 PROCEDURE — 0 IODIXANOL PER 1 ML: Performed by: SURGERY

## 2024-01-01 PROCEDURE — P9100 PATHOGEN TEST FOR PLATELETS: HCPCS

## 2024-01-01 PROCEDURE — 86140 C-REACTIVE PROTEIN: CPT

## 2024-01-01 PROCEDURE — 36556 INSERT NON-TUNNEL CV CATH: CPT | Performed by: NURSE PRACTITIONER

## 2024-01-01 PROCEDURE — 74174 CTA ABD&PLVS W/CONTRAST: CPT

## 2024-01-01 PROCEDURE — 85025 COMPLETE CBC W/AUTO DIFF WBC: CPT | Performed by: INTERNAL MEDICINE

## 2024-01-01 PROCEDURE — 25010000002 ALBUMIN HUMAN 5% PER 50 ML: Performed by: SURGERY

## 2024-01-01 PROCEDURE — 25010000002 CEFEPIME PER 500 MG: Performed by: INTERNAL MEDICINE

## 2024-01-01 PROCEDURE — P9059 PLASMA, FRZ BETWEEN 8-24HOUR: HCPCS

## 2024-01-01 PROCEDURE — 86850 RBC ANTIBODY SCREEN: CPT | Performed by: ANESTHESIOLOGY

## 2024-01-01 PROCEDURE — 86923 COMPATIBILITY TEST ELECTRIC: CPT

## 2024-01-01 PROCEDURE — 87205 SMEAR GRAM STAIN: CPT | Performed by: INTERNAL MEDICINE

## 2024-01-01 PROCEDURE — 80048 BASIC METABOLIC PNL TOTAL CA: CPT | Performed by: SURGERY

## 2024-01-01 PROCEDURE — 76937 US GUIDE VASCULAR ACCESS: CPT | Performed by: NURSE PRACTITIONER

## 2024-01-01 PROCEDURE — 0 DEXTROSE 5 % SOLUTION: Performed by: NURSE PRACTITIONER

## 2024-01-01 PROCEDURE — 86850 RBC ANTIBODY SCREEN: CPT | Performed by: INTERNAL MEDICINE

## 2024-01-01 PROCEDURE — C1876 STENT, NON-COA/NON-COV W/DEL: HCPCS | Performed by: SURGERY

## 2024-01-01 PROCEDURE — 93306 TTE W/DOPPLER COMPLETE: CPT

## 2024-01-01 PROCEDURE — 94667 MNPJ CHEST WALL 1ST: CPT

## 2024-01-01 PROCEDURE — 85018 HEMOGLOBIN: CPT | Performed by: SURGERY

## 2024-01-01 PROCEDURE — P9041 ALBUMIN (HUMAN),5%, 50ML: HCPCS

## 2024-01-01 PROCEDURE — 83735 ASSAY OF MAGNESIUM: CPT | Performed by: INTERNAL MEDICINE

## 2024-01-01 PROCEDURE — C1760 CLOSURE DEV, VASC: HCPCS | Performed by: SURGERY

## 2024-01-01 PROCEDURE — 86901 BLOOD TYPING SEROLOGIC RH(D): CPT | Performed by: ANESTHESIOLOGY

## 2024-01-01 PROCEDURE — 85610 PROTHROMBIN TIME: CPT | Performed by: SURGERY

## 2024-01-01 PROCEDURE — C1769 GUIDE WIRE: HCPCS | Performed by: SURGERY

## 2024-01-01 PROCEDURE — 74178 CT ABD&PLV WO CNTR FLWD CNTR: CPT

## 2024-01-01 PROCEDURE — 85014 HEMATOCRIT: CPT

## 2024-01-01 PROCEDURE — 25810000003 LACTATED RINGERS SOLUTION: Performed by: NURSE ANESTHETIST, CERTIFIED REGISTERED

## 2024-01-01 PROCEDURE — 25010000002 MIDAZOLAM PER 1 MG: Performed by: ANESTHESIOLOGY

## 2024-01-01 PROCEDURE — 25010000002 NALOXONE PER 1 MG

## 2024-01-01 PROCEDURE — 25810000003 SODIUM CHLORIDE 0.9 % SOLUTION 250 ML FLEX CONT: Performed by: SURGERY

## 2024-01-01 PROCEDURE — 82465 ASSAY BLD/SERUM CHOLESTEROL: CPT

## 2024-01-01 PROCEDURE — C2628 CATHETER, OCCLUSION: HCPCS | Performed by: SURGERY

## 2024-01-01 PROCEDURE — 04V03DZ RESTRICTION OF ABDOMINAL AORTA WITH INTRALUMINAL DEVICE, PERCUTANEOUS APPROACH: ICD-10-PCS | Performed by: SURGERY

## 2024-01-01 PROCEDURE — P9047 ALBUMIN (HUMAN), 25%, 50ML: HCPCS | Performed by: NURSE PRACTITIONER

## 2024-01-01 PROCEDURE — 93306 TTE W/DOPPLER COMPLETE: CPT | Performed by: INTERNAL MEDICINE

## 2024-01-01 PROCEDURE — 71250 CT THORAX DX C-: CPT

## 2024-01-01 PROCEDURE — 86901 BLOOD TYPING SEROLOGIC RH(D): CPT | Performed by: INTERNAL MEDICINE

## 2024-01-01 PROCEDURE — 25010000002 FUROSEMIDE PER 20 MG: Performed by: SURGERY

## 2024-01-01 PROCEDURE — 84100 ASSAY OF PHOSPHORUS: CPT | Performed by: INTERNAL MEDICINE

## 2024-01-01 PROCEDURE — 25010000002 FUROSEMIDE PER 20 MG: Performed by: INTERNAL MEDICINE

## 2024-01-01 PROCEDURE — 25010000002 DROPERIDOL PER 5 MG

## 2024-01-01 PROCEDURE — 25010000002 HEPARIN (PORCINE) PER 1000 UNITS: Performed by: STUDENT IN AN ORGANIZED HEALTH CARE EDUCATION/TRAINING PROGRAM

## 2024-01-01 PROCEDURE — P9012 CRYOPRECIPITATE EACH UNIT: HCPCS

## 2024-01-01 PROCEDURE — 80074 ACUTE HEPATITIS PANEL: CPT | Performed by: STUDENT IN AN ORGANIZED HEALTH CARE EDUCATION/TRAINING PROGRAM

## 2024-01-01 PROCEDURE — 93005 ELECTROCARDIOGRAM TRACING: CPT | Performed by: INTERNAL MEDICINE

## 2024-01-01 PROCEDURE — C1724 CATH, TRANS ATHEREC,ROTATION: HCPCS | Performed by: SURGERY

## 2024-01-01 PROCEDURE — 94640 AIRWAY INHALATION TREATMENT: CPT

## 2024-01-01 PROCEDURE — P9041 ALBUMIN (HUMAN),5%, 50ML: HCPCS | Performed by: SURGERY

## 2024-01-01 PROCEDURE — 82947 ASSAY GLUCOSE BLOOD QUANT: CPT

## 2024-01-01 PROCEDURE — 0 DEXTROSE 5 % SOLUTION: Performed by: INTERNAL MEDICINE

## 2024-01-01 PROCEDURE — 25010000002 NICARDIPINE 2.5 MG/ML SOLUTION 10 ML VIAL: Performed by: NURSE ANESTHETIST, CERTIFIED REGISTERED

## 2024-01-01 PROCEDURE — 25010000002 FENTANYL CITRATE (PF) 50 MCG/ML SOLUTION

## 2024-01-01 PROCEDURE — 85014 HEMATOCRIT: CPT | Performed by: SURGERY

## 2024-01-01 PROCEDURE — 84295 ASSAY OF SERUM SODIUM: CPT

## 2024-01-01 PROCEDURE — 84134 ASSAY OF PREALBUMIN: CPT

## 2024-01-01 PROCEDURE — 25010000002 LORAZEPAM PER 2 MG

## 2024-01-01 PROCEDURE — 25810000003 SODIUM CHLORIDE 0.9 % SOLUTION 250 ML FLEX CONT: Performed by: NURSE ANESTHETIST, CERTIFIED REGISTERED

## 2024-01-01 PROCEDURE — 25010000002 ALBUMIN HUMAN 25% PER 50 ML: Performed by: NURSE PRACTITIONER

## 2024-01-01 PROCEDURE — 25010000002 HYDROMORPHONE 1 MG/ML SOLUTION

## 2024-01-01 PROCEDURE — 25010000002 ALBUMIN HUMAN 5% PER 50 ML

## 2024-01-01 PROCEDURE — 04HA3DZ INSERTION OF INTRALUMINAL DEVICE INTO LEFT RENAL ARTERY, PERCUTANEOUS APPROACH: ICD-10-PCS | Performed by: SURGERY

## 2024-01-01 PROCEDURE — 047A3DZ DILATION OF LEFT RENAL ARTERY WITH INTRALUMINAL DEVICE, PERCUTANEOUS APPROACH: ICD-10-PCS | Performed by: SURGERY

## 2024-01-01 PROCEDURE — C1751 CATH, INF, PER/CENT/MIDLINE: HCPCS

## 2024-01-01 PROCEDURE — 25810000003 LACTATED RINGERS PER 1000 ML: Performed by: NURSE PRACTITIONER

## 2024-01-01 PROCEDURE — 85347 COAGULATION TIME ACTIVATED: CPT

## 2024-01-01 PROCEDURE — 74018 RADEX ABDOMEN 1 VIEW: CPT

## 2024-01-01 PROCEDURE — 86900 BLOOD TYPING SEROLOGIC ABO: CPT | Performed by: ANESTHESIOLOGY

## 2024-01-01 PROCEDURE — 86927 PLASMA FRESH FROZEN: CPT

## 2024-01-01 PROCEDURE — 25510000001 IOPAMIDOL PER 1 ML: Performed by: SURGERY

## 2024-01-01 PROCEDURE — 84132 ASSAY OF SERUM POTASSIUM: CPT

## 2024-01-01 PROCEDURE — 94002 VENT MGMT INPAT INIT DAY: CPT

## 2024-01-01 PROCEDURE — 5A1D90Z PERFORMANCE OF URINARY FILTRATION, CONTINUOUS, GREATER THAN 18 HOURS PER DAY: ICD-10-PCS | Performed by: STUDENT IN AN ORGANIZED HEALTH CARE EDUCATION/TRAINING PROGRAM

## 2024-01-01 PROCEDURE — 25010000002 MIDAZOLAM PER 1 MG: Performed by: INTERNAL MEDICINE

## 2024-01-01 PROCEDURE — 80053 COMPREHEN METABOLIC PANEL: CPT | Performed by: STUDENT IN AN ORGANIZED HEALTH CARE EDUCATION/TRAINING PROGRAM

## 2024-01-01 PROCEDURE — 0 MIDAZOLAM 1 MG/ML 100ML NS 100 MG/100ML SOLUTION: Performed by: NURSE PRACTITIONER

## 2024-01-01 PROCEDURE — 76937 US GUIDE VASCULAR ACCESS: CPT

## 2024-01-01 PROCEDURE — 25010000002 CEFAZOLIN PER 500 MG: Performed by: SURGERY

## 2024-01-01 PROCEDURE — 04LA3DZ OCCLUSION OF LEFT RENAL ARTERY WITH INTRALUMINAL DEVICE, PERCUTANEOUS APPROACH: ICD-10-PCS | Performed by: SURGERY

## 2024-01-01 DEVICE — GRFT EXCLDR C3 TRNK I/LAT 35X14.5MM14CM: Type: IMPLANTABLE DEVICE | Site: AORTA | Status: FUNCTIONAL

## 2024-01-01 DEVICE — IMPLANTABLE DEVICE: Type: IMPLANTABLE DEVICE | Site: ARTERIAL | Status: FUNCTIONAL

## 2024-01-01 DEVICE — IMPLANTABLE DEVICE: Type: IMPLANTABLE DEVICE | Site: AORTA | Status: FUNCTIONAL

## 2024-01-01 DEVICE — PREMOUNTED STENT SYSTEM
Type: IMPLANTABLE DEVICE | Site: ARTERIAL | Status: FUNCTIONAL
Brand: EXPRESS® SD RENAL/BILIARY

## 2024-01-01 DEVICE — GRAFTMASTER CORONARY STENT GRAFT SYSTEM 4.00 MM X 19 MM / RAPID-EXCHANGE
Type: IMPLANTABLE DEVICE | Site: ARTERIAL | Status: FUNCTIONAL
Brand: GRAFTMASTER

## 2024-01-01 DEVICE — HEMOST ABS SURGICEL SNOW 1X2IN: Type: IMPLANTABLE DEVICE | Site: GROIN | Status: FUNCTIONAL

## 2024-01-01 DEVICE — GRFT EXCLDR CONTRALAT 12MMX10CM: Type: IMPLANTABLE DEVICE | Site: AORTA | Status: FUNCTIONAL

## 2024-01-01 RX ORDER — HEPARIN SODIUM 5000 [USP'U]/ML
5000 INJECTION, SOLUTION INTRAVENOUS; SUBCUTANEOUS EVERY 8 HOURS SCHEDULED
Status: DISCONTINUED | OUTPATIENT
Start: 2024-01-01 | End: 2024-01-01

## 2024-01-01 RX ORDER — ALBUTEROL SULFATE 90 UG/1
2 AEROSOL, METERED RESPIRATORY (INHALATION) EVERY 6 HOURS
Status: DISCONTINUED | OUTPATIENT
Start: 2024-01-01 | End: 2024-01-01

## 2024-01-01 RX ORDER — MIDAZOLAM HYDROCHLORIDE 1 MG/ML
4 INJECTION INTRAMUSCULAR; INTRAVENOUS ONCE
Status: COMPLETED | OUTPATIENT
Start: 2024-01-01 | End: 2024-01-01

## 2024-01-01 RX ORDER — DIAZEPAM 5 MG/1
5 TABLET ORAL DAILY PRN
Status: DISCONTINUED | OUTPATIENT
Start: 2024-01-01 | End: 2024-01-01

## 2024-01-01 RX ORDER — LEVOTHYROXINE SODIUM 0.1 MG/1
100 TABLET ORAL
Status: DISCONTINUED | OUTPATIENT
Start: 2024-01-01 | End: 2024-01-01

## 2024-01-01 RX ORDER — NITROGLYCERIN 0.4 MG/1
0.4 TABLET SUBLINGUAL
Status: DISCONTINUED | OUTPATIENT
Start: 2024-01-01 | End: 2024-01-01

## 2024-01-01 RX ORDER — ALBUMIN, HUMAN INJ 5% 5 %
SOLUTION INTRAVENOUS
Status: COMPLETED
Start: 2024-01-01 | End: 2024-01-01

## 2024-01-01 RX ORDER — SODIUM CHLORIDE, SODIUM LACTATE, POTASSIUM CHLORIDE, CALCIUM CHLORIDE 600; 310; 30; 20 MG/100ML; MG/100ML; MG/100ML; MG/100ML
100 INJECTION, SOLUTION INTRAVENOUS CONTINUOUS
Status: DISCONTINUED | OUTPATIENT
Start: 2024-01-01 | End: 2024-01-01

## 2024-01-01 RX ORDER — BISOPROLOL FUMARATE 5 MG/1
2.5 TABLET, FILM COATED ORAL DAILY
Status: DISCONTINUED | OUTPATIENT
Start: 2024-01-01 | End: 2024-01-01

## 2024-01-01 RX ORDER — FAMOTIDINE 10 MG/ML
20 INJECTION, SOLUTION INTRAVENOUS DAILY
Status: DISCONTINUED | OUTPATIENT
Start: 2024-01-01 | End: 2024-01-01

## 2024-01-01 RX ORDER — HYDRALAZINE HYDROCHLORIDE 20 MG/ML
5 INJECTION INTRAMUSCULAR; INTRAVENOUS
Status: DISCONTINUED | OUTPATIENT
Start: 2024-01-01 | End: 2024-01-01 | Stop reason: HOSPADM

## 2024-01-01 RX ORDER — MORPHINE SULFATE 2 MG/ML
1 INJECTION, SOLUTION INTRAMUSCULAR; INTRAVENOUS EVERY 4 HOURS PRN
Status: DISCONTINUED | OUTPATIENT
Start: 2024-01-01 | End: 2024-01-01

## 2024-01-01 RX ORDER — ASPIRIN 325 MG
325 TABLET ORAL DAILY
Status: DISCONTINUED | OUTPATIENT
Start: 2024-01-01 | End: 2024-01-01

## 2024-01-01 RX ORDER — DROPERIDOL 2.5 MG/ML
0.62 INJECTION, SOLUTION INTRAMUSCULAR; INTRAVENOUS ONCE AS NEEDED
Status: DISCONTINUED | OUTPATIENT
Start: 2024-01-01 | End: 2024-01-01

## 2024-01-01 RX ORDER — POLYVINYL ALCOHOL 14 MG/ML
2 SOLUTION/ DROPS OPHTHALMIC
Status: DISCONTINUED | OUTPATIENT
Start: 2024-01-01 | End: 2024-06-16 | Stop reason: HOSPADM

## 2024-01-01 RX ORDER — DEXTROSE MONOHYDRATE 50 MG/ML
50 INJECTION, SOLUTION INTRAVENOUS CONTINUOUS
Status: ACTIVE | OUTPATIENT
Start: 2024-01-01 | End: 2024-01-01

## 2024-01-01 RX ORDER — SODIUM CHLORIDE 0.9 % (FLUSH) 0.9 %
10 SYRINGE (ML) INJECTION EVERY 12 HOURS SCHEDULED
Status: DISCONTINUED | OUTPATIENT
Start: 2024-01-01 | End: 2024-01-01 | Stop reason: HOSPADM

## 2024-01-01 RX ORDER — ALBUMIN (HUMAN) 12.5 G/50ML
25 SOLUTION INTRAVENOUS ONCE
Status: COMPLETED | OUTPATIENT
Start: 2024-01-01 | End: 2024-01-01

## 2024-01-01 RX ORDER — FAMOTIDINE 20 MG/1
20 TABLET, FILM COATED ORAL ONCE
Status: COMPLETED | OUTPATIENT
Start: 2024-01-01 | End: 2024-01-01

## 2024-01-01 RX ORDER — SODIUM CHLORIDE 0.9 % (FLUSH) 0.9 %
10 SYRINGE (ML) INJECTION AS NEEDED
Status: DISCONTINUED | OUTPATIENT
Start: 2024-01-01 | End: 2024-06-16 | Stop reason: HOSPADM

## 2024-01-01 RX ORDER — LABETALOL HYDROCHLORIDE 5 MG/ML
5 INJECTION, SOLUTION INTRAVENOUS
Status: DISCONTINUED | OUTPATIENT
Start: 2024-01-01 | End: 2024-01-01 | Stop reason: HOSPADM

## 2024-01-01 RX ORDER — FUROSEMIDE 10 MG/ML
80 INJECTION INTRAMUSCULAR; INTRAVENOUS ONCE
Status: COMPLETED | OUTPATIENT
Start: 2024-01-01 | End: 2024-01-01

## 2024-01-01 RX ORDER — NALOXONE HCL 0.4 MG/ML
0.4 VIAL (ML) INJECTION
Status: DISCONTINUED | OUTPATIENT
Start: 2024-01-01 | End: 2024-01-01

## 2024-01-01 RX ORDER — IPRATROPIUM BROMIDE AND ALBUTEROL SULFATE 2.5; .5 MG/3ML; MG/3ML
3 SOLUTION RESPIRATORY (INHALATION) ONCE AS NEEDED
Status: DISCONTINUED | OUTPATIENT
Start: 2024-01-01 | End: 2024-01-01

## 2024-01-01 RX ORDER — IBUPROFEN 600 MG/1
1 TABLET ORAL
Status: DISCONTINUED | OUTPATIENT
Start: 2024-01-01 | End: 2024-01-01

## 2024-01-01 RX ORDER — SODIUM CHLORIDE, SODIUM LACTATE, POTASSIUM CHLORIDE, CALCIUM CHLORIDE 600; 310; 30; 20 MG/100ML; MG/100ML; MG/100ML; MG/100ML
9 INJECTION, SOLUTION INTRAVENOUS CONTINUOUS
Status: DISCONTINUED | OUTPATIENT
Start: 2024-01-01 | End: 2024-01-01

## 2024-01-01 RX ORDER — SODIUM CHLORIDE 9 MG/ML
100 INJECTION, SOLUTION INTRAVENOUS CONTINUOUS
Status: DISCONTINUED | OUTPATIENT
Start: 2024-01-01 | End: 2024-01-01

## 2024-01-01 RX ORDER — NOREPINEPHRINE BITARTRATE 0.03 MG/ML
.02-.3 INJECTION, SOLUTION INTRAVENOUS
Status: DISCONTINUED | OUTPATIENT
Start: 2024-01-01 | End: 2024-01-01

## 2024-01-01 RX ORDER — POTASSIUM CHLORIDE 29.8 MG/ML
20 INJECTION INTRAVENOUS
Qty: 100 ML | Refills: 0 | Status: COMPLETED | OUTPATIENT
Start: 2024-01-01 | End: 2024-01-01

## 2024-01-01 RX ORDER — METOLAZONE 5 MG/1
10 TABLET ORAL ONCE
Status: COMPLETED | OUTPATIENT
Start: 2024-01-01 | End: 2024-01-01

## 2024-01-01 RX ORDER — AMLODIPINE BESYLATE 10 MG/1
10 TABLET ORAL
Status: DISCONTINUED | OUTPATIENT
Start: 2024-01-01 | End: 2024-01-01

## 2024-01-01 RX ORDER — ALBUMIN, HUMAN INJ 5% 5 %
250 SOLUTION INTRAVENOUS
Qty: 1000 ML | Refills: 0 | Status: COMPLETED | OUTPATIENT
Start: 2024-01-01 | End: 2024-01-01

## 2024-01-01 RX ORDER — HEPARIN SODIUM 1000 [USP'U]/ML
1000 INJECTION, SOLUTION INTRAVENOUS; SUBCUTANEOUS AS NEEDED
Status: DISCONTINUED | OUTPATIENT
Start: 2024-01-01 | End: 2024-01-01

## 2024-01-01 RX ORDER — DROPERIDOL 2.5 MG/ML
0.62 INJECTION, SOLUTION INTRAMUSCULAR; INTRAVENOUS ONCE AS NEEDED
Status: COMPLETED | OUTPATIENT
Start: 2024-01-01 | End: 2024-01-01

## 2024-01-01 RX ORDER — AMINO ACIDS/PROTEIN HYDROLYS 11G-40/45
30 LIQUID IN PACKET (ML) ORAL
Status: DISCONTINUED | OUTPATIENT
Start: 2024-01-01 | End: 2024-01-01

## 2024-01-01 RX ORDER — FENTANYL CITRATE 50 UG/ML
50 INJECTION, SOLUTION INTRAMUSCULAR; INTRAVENOUS
Status: DISCONTINUED | OUTPATIENT
Start: 2024-01-01 | End: 2024-01-01

## 2024-01-01 RX ORDER — SERTRALINE HYDROCHLORIDE 100 MG/1
200 TABLET, FILM COATED ORAL DAILY
Status: DISCONTINUED | OUTPATIENT
Start: 2024-01-01 | End: 2024-01-01

## 2024-01-01 RX ORDER — CALCIUM CHLORIDE, MAGNESIUM CHLORIDE, SODIUM CHLORIDE, SODIUM BICARBONATE, POTASSIUM CHLORIDE AND SODIUM PHOSPHATE DIBASIC DIHYDRATE 3.68; 3.05; 6.34; 3.09; .314; .187 G/L; G/L; G/L; G/L; G/L; G/L
1000 INJECTION INTRAVENOUS CONTINUOUS
Status: DISCONTINUED | OUTPATIENT
Start: 2024-01-01 | End: 2024-01-01

## 2024-01-01 RX ORDER — FUROSEMIDE 10 MG/ML
40 INJECTION INTRAMUSCULAR; INTRAVENOUS ONCE
Status: COMPLETED | OUTPATIENT
Start: 2024-01-01 | End: 2024-01-01

## 2024-01-01 RX ORDER — ASPIRIN 325 MG
325 TABLET, DELAYED RELEASE (ENTERIC COATED) ORAL DAILY
Status: DISCONTINUED | OUTPATIENT
Start: 2024-01-01 | End: 2024-01-01

## 2024-01-01 RX ORDER — IPRATROPIUM BROMIDE AND ALBUTEROL SULFATE 2.5; .5 MG/3ML; MG/3ML
3 SOLUTION RESPIRATORY (INHALATION)
Status: DISCONTINUED | OUTPATIENT
Start: 2024-01-01 | End: 2024-06-16 | Stop reason: HOSPADM

## 2024-01-01 RX ORDER — HYDROCODONE BITARTRATE AND ACETAMINOPHEN 5; 325 MG/1; MG/1
1 TABLET ORAL EVERY 4 HOURS PRN
Status: DISCONTINUED | OUTPATIENT
Start: 2024-01-01 | End: 2024-01-01

## 2024-01-01 RX ORDER — DEXTROSE MONOHYDRATE AND SODIUM CHLORIDE 5; .45 G/100ML; G/100ML
75 INJECTION, SOLUTION INTRAVENOUS CONTINUOUS
Status: DISCONTINUED | OUTPATIENT
Start: 2024-01-01 | End: 2024-01-01

## 2024-01-01 RX ORDER — POLYETHYLENE GLYCOL 3350 17 G/17G
17 POWDER, FOR SOLUTION ORAL DAILY
Status: DISCONTINUED | OUTPATIENT
Start: 2024-01-01 | End: 2024-01-01

## 2024-01-01 RX ORDER — CASTOR OIL AND BALSAM, PERU 788; 87 MG/G; MG/G
1 OINTMENT TOPICAL EVERY 12 HOURS SCHEDULED
Status: DISCONTINUED | OUTPATIENT
Start: 2024-01-01 | End: 2024-06-16 | Stop reason: HOSPADM

## 2024-01-01 RX ORDER — SODIUM CHLORIDE 9 MG/ML
9 INJECTION, SOLUTION INTRAVENOUS CONTINUOUS
Status: DISCONTINUED | OUTPATIENT
Start: 2024-01-01 | End: 2024-01-01 | Stop reason: SDUPTHER

## 2024-01-01 RX ORDER — SODIUM CHLORIDE 0.9 % (FLUSH) 0.9 %
10 SYRINGE (ML) INJECTION EVERY 12 HOURS SCHEDULED
Status: DISCONTINUED | OUTPATIENT
Start: 2024-01-01 | End: 2024-06-16 | Stop reason: HOSPADM

## 2024-01-01 RX ORDER — LIDOCAINE HYDROCHLORIDE 10 MG/ML
INJECTION, SOLUTION INFILTRATION; PERINEURAL AS NEEDED
Status: DISCONTINUED | OUTPATIENT
Start: 2024-01-01 | End: 2024-01-01 | Stop reason: HOSPADM

## 2024-01-01 RX ORDER — ALBUTEROL SULFATE 2.5 MG/3ML
2.5 SOLUTION RESPIRATORY (INHALATION) ONCE AS NEEDED
Status: DISCONTINUED | OUTPATIENT
Start: 2024-01-01 | End: 2024-01-01 | Stop reason: HOSPADM

## 2024-01-01 RX ORDER — SODIUM CHLORIDE 9 MG/ML
40 INJECTION, SOLUTION INTRAVENOUS AS NEEDED
Status: DISCONTINUED | OUTPATIENT
Start: 2024-01-01 | End: 2024-06-16 | Stop reason: HOSPADM

## 2024-01-01 RX ORDER — ALBUTEROL SULFATE 2.5 MG/3ML
10 SOLUTION RESPIRATORY (INHALATION) CONTINUOUS
Status: DISPENSED | OUTPATIENT
Start: 2024-01-01 | End: 2024-01-01

## 2024-01-01 RX ORDER — FENTANYL CITRATE 50 UG/ML
50 INJECTION, SOLUTION INTRAMUSCULAR; INTRAVENOUS
Status: DISCONTINUED | OUTPATIENT
Start: 2024-01-01 | End: 2024-06-16 | Stop reason: HOSPADM

## 2024-01-01 RX ORDER — TRAZODONE HYDROCHLORIDE 100 MG/1
100 TABLET ORAL NIGHTLY
Status: DISCONTINUED | OUTPATIENT
Start: 2024-01-01 | End: 2024-01-01

## 2024-01-01 RX ORDER — FAMOTIDINE 10 MG/ML
20 INJECTION, SOLUTION INTRAVENOUS ONCE
Status: CANCELLED | OUTPATIENT
Start: 2024-01-01 | End: 2024-01-01

## 2024-01-01 RX ORDER — LORAZEPAM 2 MG/ML
2 INJECTION INTRAMUSCULAR ONCE
Status: COMPLETED | OUTPATIENT
Start: 2024-01-01 | End: 2024-01-01

## 2024-01-01 RX ORDER — NIFEDIPINE 30 MG/1
90 TABLET, EXTENDED RELEASE ORAL DAILY
Status: DISCONTINUED | OUTPATIENT
Start: 2024-01-01 | End: 2024-01-01

## 2024-01-01 RX ORDER — FUROSEMIDE 10 MG/ML
20 INJECTION INTRAMUSCULAR; INTRAVENOUS ONCE
Status: COMPLETED | OUTPATIENT
Start: 2024-01-01 | End: 2024-01-01

## 2024-01-01 RX ORDER — GLYCOPYRROLATE 0.2 MG/ML
0.4 INJECTION INTRAMUSCULAR; INTRAVENOUS ONCE
Status: COMPLETED | OUTPATIENT
Start: 2024-01-01 | End: 2024-01-01

## 2024-01-01 RX ORDER — IPRATROPIUM BROMIDE AND ALBUTEROL SULFATE 2.5; .5 MG/3ML; MG/3ML
3 SOLUTION RESPIRATORY (INHALATION) EVERY 4 HOURS PRN
Status: DISCONTINUED | OUTPATIENT
Start: 2024-01-01 | End: 2024-06-16 | Stop reason: HOSPADM

## 2024-01-01 RX ORDER — BUMETANIDE 0.25 MG/ML
2 INJECTION INTRAMUSCULAR; INTRAVENOUS ONCE
Status: COMPLETED | OUTPATIENT
Start: 2024-01-01 | End: 2024-01-01

## 2024-01-01 RX ORDER — PANTOPRAZOLE SODIUM 40 MG/1
40 TABLET, DELAYED RELEASE ORAL
Status: DISCONTINUED | OUTPATIENT
Start: 2024-01-01 | End: 2024-01-01

## 2024-01-01 RX ORDER — HYDROMORPHONE HYDROCHLORIDE 1 MG/ML
0.5 INJECTION, SOLUTION INTRAMUSCULAR; INTRAVENOUS; SUBCUTANEOUS
Status: DISCONTINUED | OUTPATIENT
Start: 2024-01-01 | End: 2024-01-01

## 2024-01-01 RX ORDER — CALCIUM CHLORIDE, MAGNESIUM CHLORIDE, SODIUM CHLORIDE, SODIUM BICARBONATE, POTASSIUM CHLORIDE AND SODIUM PHOSPHATE DIBASIC DIHYDRATE 3.68; 3.05; 6.34; 3.09; .314; .187 G/L; G/L; G/L; G/L; G/L; G/L
3000 INJECTION INTRAVENOUS CONTINUOUS
Status: DISCONTINUED | OUTPATIENT
Start: 2024-01-01 | End: 2024-01-01

## 2024-01-01 RX ORDER — ONDANSETRON 2 MG/ML
4 INJECTION INTRAMUSCULAR; INTRAVENOUS EVERY 6 HOURS PRN
Status: DISCONTINUED | OUTPATIENT
Start: 2024-01-01 | End: 2024-06-16 | Stop reason: HOSPADM

## 2024-01-01 RX ORDER — PHENYLEPHRINE HYDROCHLORIDE 10 MG/ML
INJECTION INTRAVENOUS
Status: DISPENSED
Start: 2024-01-01 | End: 2024-01-01

## 2024-01-01 RX ORDER — HEPARIN SODIUM 1000 [USP'U]/ML
INJECTION, SOLUTION INTRAVENOUS; SUBCUTANEOUS AS NEEDED
Status: DISCONTINUED | OUTPATIENT
Start: 2024-01-01 | End: 2024-01-01

## 2024-01-01 RX ORDER — CHLORHEXIDINE GLUCONATE ORAL RINSE 1.2 MG/ML
15 SOLUTION DENTAL EVERY 12 HOURS SCHEDULED
Status: DISCONTINUED | OUTPATIENT
Start: 2024-01-01 | End: 2024-06-16 | Stop reason: HOSPADM

## 2024-01-01 RX ORDER — IODIXANOL 320 MG/ML
INJECTION, SOLUTION INTRAVASCULAR AS NEEDED
Status: DISCONTINUED | OUTPATIENT
Start: 2024-01-01 | End: 2024-01-01 | Stop reason: HOSPADM

## 2024-01-01 RX ORDER — MIDAZOLAM IN NACL,ISO-OSMOT/PF 50 MG/50ML
1-10 INFUSION BOTTLE (ML) INTRAVENOUS
Status: DISCONTINUED | OUTPATIENT
Start: 2024-01-01 | End: 2024-01-01

## 2024-01-01 RX ORDER — FENTANYL CITRATE 50 UG/ML
INJECTION, SOLUTION INTRAMUSCULAR; INTRAVENOUS
Status: COMPLETED
Start: 2024-01-01 | End: 2024-01-01

## 2024-01-01 RX ORDER — ROSUVASTATIN CALCIUM 20 MG/1
40 TABLET, COATED ORAL NIGHTLY
Status: DISCONTINUED | OUTPATIENT
Start: 2024-01-01 | End: 2024-01-01

## 2024-01-01 RX ORDER — MORPHINE SULFATE 2 MG/ML
1 INJECTION, SOLUTION INTRAMUSCULAR; INTRAVENOUS EVERY 4 HOURS PRN
Status: ACTIVE | OUTPATIENT
Start: 2024-01-01 | End: 2024-01-01

## 2024-01-01 RX ORDER — DROPERIDOL 2.5 MG/ML
INJECTION, SOLUTION INTRAMUSCULAR; INTRAVENOUS
Status: COMPLETED
Start: 2024-01-01 | End: 2024-01-01

## 2024-01-01 RX ORDER — MIDAZOLAM HYDROCHLORIDE 1 MG/ML
0.5 INJECTION INTRAMUSCULAR; INTRAVENOUS
Status: DISCONTINUED | OUTPATIENT
Start: 2024-01-01 | End: 2024-01-01 | Stop reason: HOSPADM

## 2024-01-01 RX ORDER — GLYCOPYRROLATE 0.2 MG/ML
0.4 INJECTION INTRAMUSCULAR; INTRAVENOUS EVERY 4 HOURS PRN
Status: DISCONTINUED | OUTPATIENT
Start: 2024-01-01 | End: 2024-06-16 | Stop reason: HOSPADM

## 2024-01-01 RX ORDER — DEXTROSE MONOHYDRATE 25 G/50ML
10-50 INJECTION, SOLUTION INTRAVENOUS
Status: DISCONTINUED | OUTPATIENT
Start: 2024-01-01 | End: 2024-01-01

## 2024-01-01 RX ORDER — ENOXAPARIN SODIUM 100 MG/ML
30 INJECTION SUBCUTANEOUS DAILY
Status: DISCONTINUED | OUTPATIENT
Start: 2024-01-01 | End: 2024-01-01

## 2024-01-01 RX ORDER — LORAZEPAM 2 MG/ML
1 INJECTION INTRAMUSCULAR ONCE AS NEEDED
Status: COMPLETED | OUTPATIENT
Start: 2024-01-01 | End: 2024-01-01

## 2024-01-01 RX ORDER — DEXTROSE MONOHYDRATE 50 MG/ML
50 INJECTION, SOLUTION INTRAVENOUS CONTINUOUS
Status: DISCONTINUED | OUTPATIENT
Start: 2024-01-01 | End: 2024-01-01

## 2024-01-01 RX ORDER — LORAZEPAM 2 MG/ML
0.5 INJECTION INTRAMUSCULAR EVERY 4 HOURS PRN
Status: DISCONTINUED | OUTPATIENT
Start: 2024-01-01 | End: 2024-06-16 | Stop reason: HOSPADM

## 2024-01-01 RX ORDER — METOCLOPRAMIDE HYDROCHLORIDE 5 MG/ML
5 INJECTION INTRAMUSCULAR; INTRAVENOUS EVERY 8 HOURS SCHEDULED
Status: DISCONTINUED | OUTPATIENT
Start: 2024-01-01 | End: 2024-01-01

## 2024-01-01 RX ORDER — LIDOCAINE HYDROCHLORIDE 10 MG/ML
0.5 INJECTION, SOLUTION EPIDURAL; INFILTRATION; INTRACAUDAL; PERINEURAL ONCE AS NEEDED
Status: COMPLETED | OUTPATIENT
Start: 2024-01-01 | End: 2024-01-01

## 2024-01-01 RX ORDER — HYDROCODONE BITARTRATE AND ACETAMINOPHEN 5; 325 MG/1; MG/1
1 TABLET ORAL EVERY 4 HOURS PRN
Status: DISPENSED | OUTPATIENT
Start: 2024-01-01 | End: 2024-01-01

## 2024-01-01 RX ORDER — SODIUM CHLORIDE 9 MG/ML
40 INJECTION, SOLUTION INTRAVENOUS AS NEEDED
Status: DISCONTINUED | OUTPATIENT
Start: 2024-01-01 | End: 2024-01-01 | Stop reason: HOSPADM

## 2024-01-01 RX ORDER — ALBUMIN, HUMAN INJ 5% 5 %
250 SOLUTION INTRAVENOUS ONCE
Status: COMPLETED | OUTPATIENT
Start: 2024-01-01 | End: 2024-01-01

## 2024-01-01 RX ORDER — METHYLPREDNISOLONE SODIUM SUCCINATE 125 MG/2ML
60 INJECTION, POWDER, LYOPHILIZED, FOR SOLUTION INTRAMUSCULAR; INTRAVENOUS EVERY 8 HOURS
Qty: 2.88 ML | Refills: 0 | Status: COMPLETED | OUTPATIENT
Start: 2024-01-01 | End: 2024-01-01

## 2024-01-01 RX ORDER — POTASSIUM CHLORIDE 7.45 MG/ML
10 INJECTION INTRAVENOUS
Status: DISCONTINUED | OUTPATIENT
Start: 2024-01-01 | End: 2024-01-01 | Stop reason: SDUPTHER

## 2024-01-01 RX ORDER — AMINO ACIDS/PROTEIN HYDROLYS 11G-40/45
30 LIQUID IN PACKET (ML) ORAL DAILY
Status: DISCONTINUED | OUTPATIENT
Start: 2024-01-01 | End: 2024-01-01

## 2024-01-01 RX ORDER — NICOTINE POLACRILEX 4 MG
15 LOZENGE BUCCAL
Status: DISCONTINUED | OUTPATIENT
Start: 2024-01-01 | End: 2024-01-01

## 2024-01-01 RX ORDER — AMINO ACIDS/PROTEIN HYDROLYS 11G-40/45
30 LIQUID IN PACKET (ML) ORAL 2 TIMES DAILY
Status: DISCONTINUED | OUTPATIENT
Start: 2024-01-01 | End: 2024-01-01

## 2024-01-01 RX ORDER — SODIUM CHLORIDE 0.9 % (FLUSH) 0.9 %
10 SYRINGE (ML) INJECTION AS NEEDED
Status: DISCONTINUED | OUTPATIENT
Start: 2024-01-01 | End: 2024-01-01 | Stop reason: HOSPADM

## 2024-01-01 RX ORDER — POTASSIUM CHLORIDE 7.45 MG/ML
10 INJECTION INTRAVENOUS
Status: DISCONTINUED | OUTPATIENT
Start: 2024-01-01 | End: 2024-01-01

## 2024-01-01 RX ORDER — HYDROMORPHONE HYDROCHLORIDE 1 MG/ML
0.5 INJECTION, SOLUTION INTRAMUSCULAR; INTRAVENOUS; SUBCUTANEOUS
Status: DISCONTINUED | OUTPATIENT
Start: 2024-01-01 | End: 2024-01-01 | Stop reason: HOSPADM

## 2024-01-01 RX ORDER — MIDAZOLAM HYDROCHLORIDE 1 MG/ML
2 INJECTION INTRAMUSCULAR; INTRAVENOUS
Status: DISCONTINUED | OUTPATIENT
Start: 2024-01-01 | End: 2024-01-01

## 2024-01-01 RX ORDER — ONDANSETRON 4 MG/1
4 TABLET, ORALLY DISINTEGRATING ORAL EVERY 6 HOURS PRN
Status: DISCONTINUED | OUTPATIENT
Start: 2024-01-01 | End: 2024-01-01

## 2024-01-01 RX ORDER — BUDESONIDE 0.5 MG/2ML
0.5 INHALANT ORAL
Status: DISCONTINUED | OUTPATIENT
Start: 2024-01-01 | End: 2024-06-16 | Stop reason: HOSPADM

## 2024-01-01 RX ORDER — FENTANYL CITRATE 50 UG/ML
50 INJECTION, SOLUTION INTRAMUSCULAR; INTRAVENOUS
Status: DISCONTINUED | OUTPATIENT
Start: 2024-01-01 | End: 2024-01-01 | Stop reason: HOSPADM

## 2024-01-01 RX ORDER — NALOXONE HYDROCHLORIDE 0.4 MG/ML
INJECTION, SOLUTION INTRAMUSCULAR; INTRAVENOUS; SUBCUTANEOUS
Status: COMPLETED
Start: 2024-01-01 | End: 2024-01-01

## 2024-01-01 RX ADMIN — CALCIUM CHLORIDE, MAGNESIUM CHLORIDE, SODIUM CHLORIDE, SODIUM BICARBONATE, POTASSIUM CHLORIDE AND SODIUM PHOSPHATE DIBASIC DIHYDRATE 1500 ML/HR: 3.68; 3.05; 6.34; 3.09; .314; .187 INJECTION INTRAVENOUS at 07:45

## 2024-01-01 RX ADMIN — Medication 10 ML: at 09:04

## 2024-01-01 RX ADMIN — CHLORHEXIDINE GLUCONATE 15 ML: 1.2 SOLUTION ORAL at 20:11

## 2024-01-01 RX ADMIN — HYDROCODONE BITARTRATE AND ACETAMINOPHEN 1 TABLET: 5; 325 TABLET ORAL at 22:21

## 2024-01-01 RX ADMIN — PROPOFOL 50 MCG/KG/MIN: 10 INJECTION, EMULSION INTRAVENOUS at 18:49

## 2024-01-01 RX ADMIN — CHLORHEXIDINE GLUCONATE 15 ML: 1.2 SOLUTION ORAL at 20:29

## 2024-01-01 RX ADMIN — PROPOFOL 40 MCG/KG/MIN: 10 INJECTION, EMULSION INTRAVENOUS at 19:37

## 2024-01-01 RX ADMIN — LEVOTHYROXINE SODIUM 100 MCG: 0.1 TABLET ORAL at 05:38

## 2024-01-01 RX ADMIN — METOCLOPRAMIDE 5 MG: 5 INJECTION, SOLUTION INTRAMUSCULAR; INTRAVENOUS at 05:02

## 2024-01-01 RX ADMIN — Medication 100 MCG/HR: at 04:37

## 2024-01-01 RX ADMIN — Medication 30 ML: at 08:51

## 2024-01-01 RX ADMIN — CHLORHEXIDINE GLUCONATE 15 ML: 1.2 SOLUTION ORAL at 08:20

## 2024-01-01 RX ADMIN — METOCLOPRAMIDE 5 MG: 5 INJECTION, SOLUTION INTRAMUSCULAR; INTRAVENOUS at 21:03

## 2024-01-01 RX ADMIN — SODIUM CHLORIDE, POTASSIUM CHLORIDE, SODIUM LACTATE AND CALCIUM CHLORIDE 100 ML/HR: 600; 310; 30; 20 INJECTION, SOLUTION INTRAVENOUS at 21:11

## 2024-01-01 RX ADMIN — IPRATROPIUM BROMIDE AND ALBUTEROL SULFATE 3 ML: 2.5; .5 SOLUTION RESPIRATORY (INHALATION) at 01:12

## 2024-01-01 RX ADMIN — IPRATROPIUM BROMIDE AND ALBUTEROL SULFATE 3 ML: 2.5; .5 SOLUTION RESPIRATORY (INHALATION) at 07:06

## 2024-01-01 RX ADMIN — BUDESONIDE 0.5 MG: 0.5 INHALANT ORAL at 18:50

## 2024-01-01 RX ADMIN — DEXTROSE MONOHYDRATE 50 ML/HR: 50 INJECTION, SOLUTION INTRAVENOUS at 17:28

## 2024-01-01 RX ADMIN — Medication 1 APPLICATION: at 08:21

## 2024-01-01 RX ADMIN — Medication 1 APPLICATION: at 08:02

## 2024-01-01 RX ADMIN — PIPERACILLIN AND TAZOBACTAM 4.5 G: 4; .5 INJECTION, POWDER, FOR SOLUTION INTRAVENOUS at 05:01

## 2024-01-01 RX ADMIN — POTASSIUM CHLORIDE 20 MEQ: 400 INJECTION, SOLUTION INTRAVENOUS at 00:26

## 2024-01-01 RX ADMIN — IPRATROPIUM BROMIDE AND ALBUTEROL SULFATE 3 ML: 2.5; .5 SOLUTION RESPIRATORY (INHALATION) at 18:50

## 2024-01-01 RX ADMIN — CALCIUM CHLORIDE, MAGNESIUM CHLORIDE, SODIUM CHLORIDE, SODIUM BICARBONATE, POTASSIUM CHLORIDE AND SODIUM PHOSPHATE DIBASIC DIHYDRATE 1500 ML/HR: 3.68; 3.05; 6.34; 3.09; .314; .187 INJECTION INTRAVENOUS at 01:10

## 2024-01-01 RX ADMIN — ENOXAPARIN SODIUM 30 MG: 30 INJECTION SUBCUTANEOUS at 08:20

## 2024-01-01 RX ADMIN — PROPOFOL 50 MCG/KG/MIN: 10 INJECTION, EMULSION INTRAVENOUS at 06:28

## 2024-01-01 RX ADMIN — FENTANYL CITRATE 50 MCG: 50 INJECTION, SOLUTION INTRAMUSCULAR; INTRAVENOUS at 10:46

## 2024-01-01 RX ADMIN — Medication 1 APPLICATION: at 20:06

## 2024-01-01 RX ADMIN — ENOXAPARIN SODIUM 30 MG: 30 INJECTION SUBCUTANEOUS at 10:54

## 2024-01-01 RX ADMIN — METOCLOPRAMIDE 5 MG: 5 INJECTION, SOLUTION INTRAMUSCULAR; INTRAVENOUS at 05:36

## 2024-01-01 RX ADMIN — IPRATROPIUM BROMIDE AND ALBUTEROL SULFATE 3 ML: 2.5; .5 SOLUTION RESPIRATORY (INHALATION) at 07:23

## 2024-01-01 RX ADMIN — CALCIUM CHLORIDE, MAGNESIUM CHLORIDE, SODIUM CHLORIDE, SODIUM BICARBONATE, POTASSIUM CHLORIDE AND SODIUM PHOSPHATE DIBASIC DIHYDRATE 1500 ML/HR: 3.68; 3.05; 6.34; 3.09; .314; .187 INJECTION INTRAVENOUS at 14:20

## 2024-01-01 RX ADMIN — ASPIRIN 325 MG: 325 TABLET ORAL at 09:03

## 2024-01-01 RX ADMIN — CALCIUM CHLORIDE, MAGNESIUM CHLORIDE, SODIUM CHLORIDE, SODIUM BICARBONATE, POTASSIUM CHLORIDE AND SODIUM PHOSPHATE DIBASIC DIHYDRATE 1000 ML/HR: 3.68; 3.05; 6.34; 3.09; .314; .187 INJECTION INTRAVENOUS at 17:08

## 2024-01-01 RX ADMIN — HYDROMORPHONE HYDROCHLORIDE 0.5 MG: 1 INJECTION, SOLUTION INTRAMUSCULAR; INTRAVENOUS; SUBCUTANEOUS at 10:34

## 2024-01-01 RX ADMIN — IPRATROPIUM BROMIDE AND ALBUTEROL SULFATE 3 ML: 2.5; .5 SOLUTION RESPIRATORY (INHALATION) at 18:27

## 2024-01-01 RX ADMIN — Medication 300 MCG/HR: at 17:59

## 2024-01-01 RX ADMIN — CEFEPIME 2000 MG: 2 INJECTION, POWDER, FOR SOLUTION INTRAVENOUS at 21:02

## 2024-01-01 RX ADMIN — POTASSIUM CHLORIDE 10 MEQ: 7.46 INJECTION, SOLUTION INTRAVENOUS at 21:51

## 2024-01-01 RX ADMIN — CALCIUM CHLORIDE, MAGNESIUM CHLORIDE, SODIUM CHLORIDE, SODIUM BICARBONATE, POTASSIUM CHLORIDE AND SODIUM PHOSPHATE DIBASIC DIHYDRATE 1000 ML/HR: 3.68; 3.05; 6.34; 3.09; .314; .187 INJECTION INTRAVENOUS at 22:58

## 2024-01-01 RX ADMIN — SERTRALINE 200 MG: 100 TABLET, FILM COATED ORAL at 08:09

## 2024-01-01 RX ADMIN — Medication 10 ML: at 20:00

## 2024-01-01 RX ADMIN — CALCIUM CHLORIDE, MAGNESIUM CHLORIDE, SODIUM CHLORIDE, SODIUM BICARBONATE, POTASSIUM CHLORIDE AND SODIUM PHOSPHATE DIBASIC DIHYDRATE 1000 ML/HR: 3.68; 3.05; 6.34; 3.09; .314; .187 INJECTION INTRAVENOUS at 20:09

## 2024-01-01 RX ADMIN — CALCIUM CHLORIDE, MAGNESIUM CHLORIDE, SODIUM CHLORIDE, SODIUM BICARBONATE, POTASSIUM CHLORIDE AND SODIUM PHOSPHATE DIBASIC DIHYDRATE 1000 ML/HR: 3.68; 3.05; 6.34; 3.09; .314; .187 INJECTION INTRAVENOUS at 06:40

## 2024-01-01 RX ADMIN — IPRATROPIUM BROMIDE AND ALBUTEROL SULFATE 3 ML: 2.5; .5 SOLUTION RESPIRATORY (INHALATION) at 00:38

## 2024-01-01 RX ADMIN — BISOPROLOL FUMARATE 2.5 MG: 5 TABLET ORAL at 10:44

## 2024-01-01 RX ADMIN — DIATRIZOATE MEGLUMINE AND DIATRIZOATE SODIUM 15 ML: 660; 100 LIQUID ORAL; RECTAL at 09:59

## 2024-01-01 RX ADMIN — BUDESONIDE 0.5 MG: 0.5 INHALANT ORAL at 07:23

## 2024-01-01 RX ADMIN — Medication 10 ML: at 20:29

## 2024-01-01 RX ADMIN — Medication 1 APPLICATION: at 08:17

## 2024-01-01 RX ADMIN — LEVOTHYROXINE SODIUM 100 MCG: 0.1 TABLET ORAL at 05:36

## 2024-01-01 RX ADMIN — NALXONE HYDROCHLORIDE 0.4 MG: 0.4 INJECTION INTRAMUSCULAR; INTRAVENOUS; SUBCUTANEOUS at 11:43

## 2024-01-01 RX ADMIN — SERTRALINE 200 MG: 100 TABLET, FILM COATED ORAL at 08:20

## 2024-01-01 RX ADMIN — IPRATROPIUM BROMIDE AND ALBUTEROL SULFATE 3 ML: 2.5; .5 SOLUTION RESPIRATORY (INHALATION) at 08:05

## 2024-01-01 RX ADMIN — CALCIUM CHLORIDE, MAGNESIUM CHLORIDE, SODIUM CHLORIDE, SODIUM BICARBONATE, POTASSIUM CHLORIDE AND SODIUM PHOSPHATE DIBASIC DIHYDRATE 1000 ML/HR: 3.68; 3.05; 6.34; 3.09; .314; .187 INJECTION INTRAVENOUS at 03:46

## 2024-01-01 RX ADMIN — MIDAZOLAM HYDROCHLORIDE 4 MG: 1 INJECTION, SOLUTION INTRAMUSCULAR; INTRAVENOUS at 02:54

## 2024-01-01 RX ADMIN — ALBUMIN (HUMAN) 250 ML: 12.5 INJECTION, SOLUTION INTRAVENOUS at 12:17

## 2024-01-01 RX ADMIN — Medication 10 ML: at 08:16

## 2024-01-01 RX ADMIN — FAMOTIDINE 20 MG: 10 INJECTION, SOLUTION INTRAVENOUS at 09:03

## 2024-01-01 RX ADMIN — LIDOCAINE HYDROCHLORIDE 0.5 ML: 10 INJECTION, SOLUTION EPIDURAL; INFILTRATION; INTRACAUDAL; PERINEURAL at 07:38

## 2024-01-01 RX ADMIN — BUDESONIDE 0.5 MG: 0.5 INHALANT ORAL at 19:05

## 2024-01-01 RX ADMIN — POLYETHYLENE GLYCOL 3350 17 G: 17 POWDER, FOR SOLUTION ORAL at 08:20

## 2024-01-01 RX ADMIN — CALCIUM CHLORIDE, MAGNESIUM CHLORIDE, SODIUM CHLORIDE, SODIUM BICARBONATE, POTASSIUM CHLORIDE AND SODIUM PHOSPHATE DIBASIC DIHYDRATE 1500 ML/HR: 3.68; 3.05; 6.34; 3.09; .314; .187 INJECTION INTRAVENOUS at 17:59

## 2024-01-01 RX ADMIN — Medication 300 MCG/HR: at 17:30

## 2024-01-01 RX ADMIN — Medication 10 ML: at 20:11

## 2024-01-01 RX ADMIN — IOPAMIDOL 95 ML: 755 INJECTION, SOLUTION INTRAVENOUS at 13:39

## 2024-01-01 RX ADMIN — POLYETHYLENE GLYCOL 3350 17 G: 17 POWDER, FOR SOLUTION ORAL at 08:51

## 2024-01-01 RX ADMIN — IPRATROPIUM BROMIDE AND ALBUTEROL SULFATE 3 ML: 2.5; .5 SOLUTION RESPIRATORY (INHALATION) at 12:31

## 2024-01-01 RX ADMIN — METOCLOPRAMIDE 5 MG: 5 INJECTION, SOLUTION INTRAMUSCULAR; INTRAVENOUS at 05:50

## 2024-01-01 RX ADMIN — CHLORHEXIDINE GLUCONATE 15 ML: 1.2 SOLUTION ORAL at 08:10

## 2024-01-01 RX ADMIN — FUROSEMIDE 40 MG: 10 INJECTION, SOLUTION INTRAMUSCULAR; INTRAVENOUS at 13:39

## 2024-01-01 RX ADMIN — IPRATROPIUM BROMIDE AND ALBUTEROL SULFATE 3 ML: 2.5; .5 SOLUTION RESPIRATORY (INHALATION) at 19:10

## 2024-01-01 RX ADMIN — CALCIUM CHLORIDE, MAGNESIUM CHLORIDE, SODIUM CHLORIDE, SODIUM BICARBONATE, POTASSIUM CHLORIDE AND SODIUM PHOSPHATE DIBASIC DIHYDRATE 1000 ML/HR: 3.68; 3.05; 6.34; 3.09; .314; .187 INJECTION INTRAVENOUS at 08:48

## 2024-01-01 RX ADMIN — AMLODIPINE BESYLATE 10 MG: 10 TABLET ORAL at 08:20

## 2024-01-01 RX ADMIN — CHLORHEXIDINE GLUCONATE 15 ML: 1.2 SOLUTION ORAL at 21:03

## 2024-01-01 RX ADMIN — IPRATROPIUM BROMIDE AND ALBUTEROL SULFATE 3 ML: 2.5; .5 SOLUTION RESPIRATORY (INHALATION) at 19:05

## 2024-01-01 RX ADMIN — IPRATROPIUM BROMIDE AND ALBUTEROL SULFATE 3 ML: 2.5; .5 SOLUTION RESPIRATORY (INHALATION) at 01:07

## 2024-01-01 RX ADMIN — CALCIUM CHLORIDE, MAGNESIUM CHLORIDE, SODIUM CHLORIDE, SODIUM BICARBONATE, POTASSIUM CHLORIDE AND SODIUM PHOSPHATE DIBASIC DIHYDRATE 1000 ML/HR: 3.68; 3.05; 6.34; 3.09; .314; .187 INJECTION INTRAVENOUS at 08:47

## 2024-01-01 RX ADMIN — SERTRALINE 200 MG: 100 TABLET, FILM COATED ORAL at 10:45

## 2024-01-01 RX ADMIN — Medication 150 MCG/HR: at 22:48

## 2024-01-01 RX ADMIN — SERTRALINE 200 MG: 100 TABLET, FILM COATED ORAL at 08:02

## 2024-01-01 RX ADMIN — CALCIUM CHLORIDE, MAGNESIUM CHLORIDE, SODIUM CHLORIDE, SODIUM BICARBONATE, POTASSIUM CHLORIDE AND SODIUM PHOSPHATE DIBASIC DIHYDRATE 1000 ML/HR: 3.68; 3.05; 6.34; 3.09; .314; .187 INJECTION INTRAVENOUS at 22:37

## 2024-01-01 RX ADMIN — BUDESONIDE 0.5 MG: 0.5 INHALANT ORAL at 08:05

## 2024-01-01 RX ADMIN — BUDESONIDE 0.5 MG: 0.5 INHALANT ORAL at 18:52

## 2024-01-01 RX ADMIN — POLYETHYLENE GLYCOL 3350 17 G: 17 POWDER, FOR SOLUTION ORAL at 08:09

## 2024-01-01 RX ADMIN — Medication 300 MCG/HR: at 17:52

## 2024-01-01 RX ADMIN — CALCIUM CHLORIDE, MAGNESIUM CHLORIDE, SODIUM CHLORIDE, SODIUM BICARBONATE, POTASSIUM CHLORIDE AND SODIUM PHOSPHATE DIBASIC DIHYDRATE 1500 ML/HR: 3.68; 3.05; 6.34; 3.09; .314; .187 INJECTION INTRAVENOUS at 07:51

## 2024-01-01 RX ADMIN — ASPIRIN 325 MG: 325 TABLET ORAL at 08:49

## 2024-01-01 RX ADMIN — METOCLOPRAMIDE 5 MG: 5 INJECTION, SOLUTION INTRAMUSCULAR; INTRAVENOUS at 21:49

## 2024-01-01 RX ADMIN — Medication 1 APPLICATION: at 20:29

## 2024-01-01 RX ADMIN — FAMOTIDINE 20 MG: 10 INJECTION, SOLUTION INTRAVENOUS at 08:02

## 2024-01-01 RX ADMIN — CHLORHEXIDINE GLUCONATE 15 ML: 1.2 SOLUTION ORAL at 20:07

## 2024-01-01 RX ADMIN — SODIUM CHLORIDE 9 ML/HR: 9 INJECTION, SOLUTION INTRAVENOUS at 07:47

## 2024-01-01 RX ADMIN — ALBUMIN (HUMAN) 250 ML: 12.5 INJECTION, SOLUTION INTRAVENOUS at 12:51

## 2024-01-01 RX ADMIN — FAMOTIDINE 20 MG: 10 INJECTION, SOLUTION INTRAVENOUS at 08:21

## 2024-01-01 RX ADMIN — Medication 300 MCG/HR: at 09:14

## 2024-01-01 RX ADMIN — DROPERIDOL 0.62 MG: 2.5 INJECTION, SOLUTION INTRAMUSCULAR; INTRAVENOUS at 10:25

## 2024-01-01 RX ADMIN — CHLORHEXIDINE GLUCONATE 15 ML: 1.2 SOLUTION ORAL at 08:02

## 2024-01-01 RX ADMIN — CEFEPIME 2000 MG: 2 INJECTION, POWDER, FOR SOLUTION INTRAVENOUS at 17:31

## 2024-01-01 RX ADMIN — BUDESONIDE 0.5 MG: 0.5 INHALANT ORAL at 18:27

## 2024-01-01 RX ADMIN — NICARDIPINE HYDROCHLORIDE 7.5 MG/HR: 25 INJECTION, SOLUTION INTRAVENOUS at 04:49

## 2024-01-01 RX ADMIN — Medication 30 ML: at 18:23

## 2024-01-01 RX ADMIN — PIPERACILLIN AND TAZOBACTAM 4.5 G: 4; .5 INJECTION, POWDER, FOR SOLUTION INTRAVENOUS at 10:12

## 2024-01-01 RX ADMIN — Medication 1 APPLICATION: at 08:16

## 2024-01-01 RX ADMIN — NOREPINEPHRINE BITARTRATE 0.03 MCG/KG/MIN: 0.03 INJECTION, SOLUTION INTRAVENOUS at 10:42

## 2024-01-01 RX ADMIN — MIDAZOLAM HYDROCHLORIDE 2 MG: 1 INJECTION, SOLUTION INTRAMUSCULAR; INTRAVENOUS at 14:18

## 2024-01-01 RX ADMIN — LORAZEPAM 2 MG: 2 INJECTION INTRAMUSCULAR; INTRAVENOUS at 16:13

## 2024-01-01 RX ADMIN — LEVOTHYROXINE SODIUM 100 MCG: 0.1 TABLET ORAL at 05:49

## 2024-01-01 RX ADMIN — NICARDIPINE HYDROCHLORIDE 5 MG/HR: 25 INJECTION, SOLUTION INTRAVENOUS at 00:22

## 2024-01-01 RX ADMIN — CHLORHEXIDINE GLUCONATE 15 ML: 1.2 SOLUTION ORAL at 09:03

## 2024-01-01 RX ADMIN — SERTRALINE 200 MG: 100 TABLET, FILM COATED ORAL at 09:03

## 2024-01-01 RX ADMIN — Medication 10 ML: at 08:51

## 2024-01-01 RX ADMIN — FENTANYL CITRATE 50 MCG: 50 INJECTION, SOLUTION INTRAMUSCULAR; INTRAVENOUS at 16:50

## 2024-01-01 RX ADMIN — Medication 30 ML: at 21:03

## 2024-01-01 RX ADMIN — CALCIUM CHLORIDE, MAGNESIUM CHLORIDE, SODIUM CHLORIDE, SODIUM BICARBONATE, POTASSIUM CHLORIDE AND SODIUM PHOSPHATE DIBASIC DIHYDRATE 1000 ML/HR: 3.68; 3.05; 6.34; 3.09; .314; .187 INJECTION INTRAVENOUS at 18:08

## 2024-01-01 RX ADMIN — Medication 200 MCG/HR: at 15:57

## 2024-01-01 RX ADMIN — PROPOFOL 50 MCG/KG/MIN: 10 INJECTION, EMULSION INTRAVENOUS at 05:08

## 2024-01-01 RX ADMIN — POLYETHYLENE GLYCOL 3350 17 G: 17 POWDER, FOR SOLUTION ORAL at 08:10

## 2024-01-01 RX ADMIN — THIAMINE HYDROCHLORIDE 500 MG: 100 INJECTION, SOLUTION INTRAMUSCULAR; INTRAVENOUS at 06:01

## 2024-01-01 RX ADMIN — NOREPINEPHRINE BITARTRATE 0.02 MCG/KG/MIN: 0.03 INJECTION, SOLUTION INTRAVENOUS at 22:23

## 2024-01-01 RX ADMIN — IPRATROPIUM BROMIDE AND ALBUTEROL SULFATE 3 ML: 2.5; .5 SOLUTION RESPIRATORY (INHALATION) at 07:14

## 2024-01-01 RX ADMIN — BUDESONIDE 0.5 MG: 0.5 INHALANT ORAL at 08:01

## 2024-01-01 RX ADMIN — IPRATROPIUM BROMIDE AND ALBUTEROL SULFATE 3 ML: 2.5; .5 SOLUTION RESPIRATORY (INHALATION) at 22:37

## 2024-01-01 RX ADMIN — CALCIUM CHLORIDE, MAGNESIUM CHLORIDE, SODIUM CHLORIDE, SODIUM BICARBONATE, POTASSIUM CHLORIDE AND SODIUM PHOSPHATE DIBASIC DIHYDRATE 1000 ML/HR: 3.68; 3.05; 6.34; 3.09; .314; .187 INJECTION INTRAVENOUS at 13:04

## 2024-01-01 RX ADMIN — BUDESONIDE 0.5 MG: 0.5 INHALANT ORAL at 19:03

## 2024-01-01 RX ADMIN — MIDAZOLAM HYDROCHLORIDE 2 MG: 1 INJECTION, SOLUTION INTRAMUSCULAR; INTRAVENOUS at 22:23

## 2024-01-01 RX ADMIN — MIDAZOLAM HYDROCHLORIDE 2 MG: 1 INJECTION, SOLUTION INTRAMUSCULAR; INTRAVENOUS at 08:20

## 2024-01-01 RX ADMIN — Medication 30 ML: at 18:05

## 2024-01-01 RX ADMIN — CALCIUM CHLORIDE, MAGNESIUM CHLORIDE, SODIUM CHLORIDE, SODIUM BICARBONATE, POTASSIUM CHLORIDE AND SODIUM PHOSPHATE DIBASIC DIHYDRATE 1500 ML/HR: 3.68; 3.05; 6.34; 3.09; .314; .187 INJECTION INTRAVENOUS at 04:30

## 2024-01-01 RX ADMIN — SERTRALINE 200 MG: 100 TABLET, FILM COATED ORAL at 08:08

## 2024-01-01 RX ADMIN — ALBUMIN (HUMAN) 250 ML: 12.5 INJECTION, SOLUTION INTRAVENOUS at 09:24

## 2024-01-01 RX ADMIN — SODIUM CHLORIDE 2000 MG: 900 INJECTION INTRAVENOUS at 17:31

## 2024-01-01 RX ADMIN — HEPARIN SODIUM 5000 UNITS: 5000 INJECTION INTRAVENOUS; SUBCUTANEOUS at 05:02

## 2024-01-01 RX ADMIN — FENTANYL CITRATE 50 MCG: 50 INJECTION, SOLUTION INTRAMUSCULAR; INTRAVENOUS at 10:26

## 2024-01-01 RX ADMIN — ASPIRIN 325 MG: 325 TABLET ORAL at 10:45

## 2024-01-01 RX ADMIN — METHYLPREDNISOLONE SODIUM SUCCINATE 60 MG: 125 INJECTION, POWDER, FOR SOLUTION INTRAMUSCULAR; INTRAVENOUS at 10:09

## 2024-01-01 RX ADMIN — CALCIUM CHLORIDE, MAGNESIUM CHLORIDE, SODIUM CHLORIDE, SODIUM BICARBONATE, POTASSIUM CHLORIDE AND SODIUM PHOSPHATE DIBASIC DIHYDRATE 1000 ML/HR: 3.68; 3.05; 6.34; 3.09; .314; .187 INJECTION INTRAVENOUS at 09:30

## 2024-01-01 RX ADMIN — CHLORHEXIDINE GLUCONATE 15 ML: 1.2 SOLUTION ORAL at 08:08

## 2024-01-01 RX ADMIN — Medication 10 ML: at 20:07

## 2024-01-01 RX ADMIN — BUDESONIDE 0.5 MG: 0.5 INHALANT ORAL at 07:06

## 2024-01-01 RX ADMIN — CALCIUM CHLORIDE, MAGNESIUM CHLORIDE, SODIUM CHLORIDE, SODIUM BICARBONATE, POTASSIUM CHLORIDE AND SODIUM PHOSPHATE DIBASIC DIHYDRATE 1000 ML/HR: 3.68; 3.05; 6.34; 3.09; .314; .187 INJECTION INTRAVENOUS at 04:31

## 2024-01-01 RX ADMIN — CEFEPIME 2000 MG: 2 INJECTION, POWDER, FOR SOLUTION INTRAVENOUS at 05:36

## 2024-01-01 RX ADMIN — Medication 10 ML: at 08:49

## 2024-01-01 RX ADMIN — SODIUM CHLORIDE 2000 MG: 900 INJECTION INTRAVENOUS at 10:44

## 2024-01-01 RX ADMIN — Medication 1 APPLICATION: at 20:00

## 2024-01-01 RX ADMIN — IPRATROPIUM BROMIDE AND ALBUTEROL SULFATE 3 ML: 2.5; .5 SOLUTION RESPIRATORY (INHALATION) at 18:56

## 2024-01-01 RX ADMIN — CALCIUM CHLORIDE, MAGNESIUM CHLORIDE, SODIUM CHLORIDE, SODIUM BICARBONATE, POTASSIUM CHLORIDE AND SODIUM PHOSPHATE DIBASIC DIHYDRATE 1500 ML/HR: 3.68; 3.05; 6.34; 3.09; .314; .187 INJECTION INTRAVENOUS at 21:13

## 2024-01-01 RX ADMIN — METOCLOPRAMIDE 5 MG: 5 INJECTION, SOLUTION INTRAMUSCULAR; INTRAVENOUS at 13:17

## 2024-01-01 RX ADMIN — METOCLOPRAMIDE 5 MG: 5 INJECTION, SOLUTION INTRAMUSCULAR; INTRAVENOUS at 22:00

## 2024-01-01 RX ADMIN — CALCIUM CHLORIDE, MAGNESIUM CHLORIDE, SODIUM CHLORIDE, SODIUM BICARBONATE, POTASSIUM CHLORIDE AND SODIUM PHOSPHATE DIBASIC DIHYDRATE 1000 ML/HR: 3.68; 3.05; 6.34; 3.09; .314; .187 INJECTION INTRAVENOUS at 22:38

## 2024-01-01 RX ADMIN — NICARDIPINE HYDROCHLORIDE 5 MG/HR: 25 INJECTION, SOLUTION INTRAVENOUS at 22:53

## 2024-01-01 RX ADMIN — IPRATROPIUM BROMIDE AND ALBUTEROL SULFATE 3 ML: 2.5; .5 SOLUTION RESPIRATORY (INHALATION) at 13:13

## 2024-01-01 RX ADMIN — DEXTROSE MONOHYDRATE 50 ML: 25 INJECTION, SOLUTION INTRAVENOUS at 17:23

## 2024-01-01 RX ADMIN — ALBUMIN (HUMAN) 25 G: 0.25 INJECTION, SOLUTION INTRAVENOUS at 02:51

## 2024-01-01 RX ADMIN — CHLORHEXIDINE GLUCONATE 15 ML: 1.2 SOLUTION ORAL at 21:47

## 2024-01-01 RX ADMIN — LEVOTHYROXINE SODIUM 100 MCG: 0.1 TABLET ORAL at 05:52

## 2024-01-01 RX ADMIN — CALCIUM CHLORIDE, MAGNESIUM CHLORIDE, SODIUM CHLORIDE, SODIUM BICARBONATE, POTASSIUM CHLORIDE AND SODIUM PHOSPHATE DIBASIC DIHYDRATE 1000 ML/HR: 3.68; 3.05; 6.34; 3.09; .314; .187 INJECTION INTRAVENOUS at 01:45

## 2024-01-01 RX ADMIN — NOREPINEPHRINE BITARTRATE 0.04 MCG/KG/MIN: 0.03 INJECTION, SOLUTION INTRAVENOUS at 06:46

## 2024-01-01 RX ADMIN — CALCIUM CHLORIDE, MAGNESIUM CHLORIDE, SODIUM CHLORIDE, SODIUM BICARBONATE, POTASSIUM CHLORIDE AND SODIUM PHOSPHATE DIBASIC DIHYDRATE 1000 ML/HR: 3.68; 3.05; 6.34; 3.09; .314; .187 INJECTION INTRAVENOUS at 13:03

## 2024-01-01 RX ADMIN — MIDAZOLAM HYDROCHLORIDE 5 MG/HR: 1 INJECTION, SOLUTION INTRAVENOUS at 20:04

## 2024-01-01 RX ADMIN — PIPERACILLIN AND TAZOBACTAM 4.5 G: 4; .5 INJECTION, POWDER, FOR SOLUTION INTRAVENOUS at 21:53

## 2024-01-01 RX ADMIN — CHLORHEXIDINE GLUCONATE 15 ML: 1.2 SOLUTION ORAL at 20:00

## 2024-01-01 RX ADMIN — METOCLOPRAMIDE 5 MG: 5 INJECTION, SOLUTION INTRAMUSCULAR; INTRAVENOUS at 13:05

## 2024-01-01 RX ADMIN — LEVOTHYROXINE SODIUM 100 MCG: 0.1 TABLET ORAL at 05:02

## 2024-01-01 RX ADMIN — Medication 300 MCG/HR: at 01:50

## 2024-01-01 RX ADMIN — BUMETANIDE 2 MG: 0.25 INJECTION, SOLUTION INTRAMUSCULAR; INTRAVENOUS at 09:40

## 2024-01-01 RX ADMIN — BISOPROLOL FUMARATE 2.5 MG: 5 TABLET ORAL at 08:09

## 2024-01-01 RX ADMIN — Medication 1 APPLICATION: at 08:10

## 2024-01-01 RX ADMIN — Medication 30 ML: at 09:03

## 2024-01-01 RX ADMIN — MIDAZOLAM HYDROCHLORIDE 2 MG: 1 INJECTION, SOLUTION INTRAMUSCULAR; INTRAVENOUS at 00:43

## 2024-01-01 RX ADMIN — LORAZEPAM 1 MG: 2 INJECTION INTRAMUSCULAR; INTRAVENOUS at 17:04

## 2024-01-01 RX ADMIN — FAMOTIDINE 20 MG: 20 TABLET ORAL at 07:38

## 2024-01-01 RX ADMIN — CALCIUM CHLORIDE, MAGNESIUM CHLORIDE, SODIUM CHLORIDE, SODIUM BICARBONATE, POTASSIUM CHLORIDE AND SODIUM PHOSPHATE DIBASIC DIHYDRATE 1500 ML/HR: 3.68; 3.05; 6.34; 3.09; .314; .187 INJECTION INTRAVENOUS at 08:29

## 2024-01-01 RX ADMIN — PIPERACILLIN AND TAZOBACTAM 4.5 G: 4; .5 INJECTION, POWDER, FOR SOLUTION INTRAVENOUS at 15:01

## 2024-01-01 RX ADMIN — Medication 30 ML: at 08:13

## 2024-01-01 RX ADMIN — METOCLOPRAMIDE 5 MG: 5 INJECTION, SOLUTION INTRAMUSCULAR; INTRAVENOUS at 21:53

## 2024-01-01 RX ADMIN — PROPOFOL 50 MCG/KG/MIN: 10 INJECTION, EMULSION INTRAVENOUS at 01:05

## 2024-01-01 RX ADMIN — CALCIUM CHLORIDE, MAGNESIUM CHLORIDE, SODIUM CHLORIDE, SODIUM BICARBONATE, POTASSIUM CHLORIDE AND SODIUM PHOSPHATE DIBASIC DIHYDRATE 1000 ML/HR: 3.68; 3.05; 6.34; 3.09; .314; .187 INJECTION INTRAVENOUS at 17:09

## 2024-01-01 RX ADMIN — FAMOTIDINE 20 MG: 10 INJECTION, SOLUTION INTRAVENOUS at 08:51

## 2024-01-01 RX ADMIN — Medication 300 MCG/HR: at 09:34

## 2024-01-01 RX ADMIN — BUDESONIDE 0.5 MG: 0.5 INHALANT ORAL at 19:10

## 2024-01-01 RX ADMIN — CHLORHEXIDINE GLUCONATE 15 ML: 1.2 SOLUTION ORAL at 00:48

## 2024-01-01 RX ADMIN — CHLORHEXIDINE GLUCONATE 15 ML: 1.2 SOLUTION ORAL at 21:57

## 2024-01-01 RX ADMIN — Medication 10 ML: at 20:04

## 2024-01-01 RX ADMIN — CALCIUM CHLORIDE, MAGNESIUM CHLORIDE, SODIUM CHLORIDE, SODIUM BICARBONATE, POTASSIUM CHLORIDE AND SODIUM PHOSPHATE DIBASIC DIHYDRATE 1500 ML/HR: 3.68; 3.05; 6.34; 3.09; .314; .187 INJECTION INTRAVENOUS at 04:03

## 2024-01-01 RX ADMIN — CALCIUM CHLORIDE, MAGNESIUM CHLORIDE, SODIUM CHLORIDE, SODIUM BICARBONATE, POTASSIUM CHLORIDE AND SODIUM PHOSPHATE DIBASIC DIHYDRATE 1500 ML/HR: 3.68; 3.05; 6.34; 3.09; .314; .187 INJECTION INTRAVENOUS at 21:50

## 2024-01-01 RX ADMIN — DEXTROSE MONOHYDRATE 50 ML/HR: 50 INJECTION, SOLUTION INTRAVENOUS at 16:54

## 2024-01-01 RX ADMIN — IPRATROPIUM BROMIDE AND ALBUTEROL SULFATE 3 ML: 2.5; .5 SOLUTION RESPIRATORY (INHALATION) at 13:27

## 2024-01-01 RX ADMIN — SERTRALINE 200 MG: 100 TABLET, FILM COATED ORAL at 08:48

## 2024-01-01 RX ADMIN — CALCIUM CHLORIDE, MAGNESIUM CHLORIDE, SODIUM CHLORIDE, SODIUM BICARBONATE, POTASSIUM CHLORIDE AND SODIUM PHOSPHATE DIBASIC DIHYDRATE 1000 ML/HR: 3.68; 3.05; 6.34; 3.09; .314; .187 INJECTION INTRAVENOUS at 15:02

## 2024-01-01 RX ADMIN — DEXTROSE MONOHYDRATE 50 ML/HR: 50 INJECTION, SOLUTION INTRAVENOUS at 12:19

## 2024-01-01 RX ADMIN — HEPARIN SODIUM 5000 UNITS: 5000 INJECTION INTRAVENOUS; SUBCUTANEOUS at 21:01

## 2024-01-01 RX ADMIN — Medication 10 ML: at 08:21

## 2024-01-01 RX ADMIN — CALCIUM CHLORIDE, MAGNESIUM CHLORIDE, SODIUM CHLORIDE, SODIUM BICARBONATE, POTASSIUM CHLORIDE AND SODIUM PHOSPHATE DIBASIC DIHYDRATE 1500 ML/HR: 3.68; 3.05; 6.34; 3.09; .314; .187 INJECTION INTRAVENOUS at 11:49

## 2024-01-01 RX ADMIN — CALCIUM CHLORIDE, MAGNESIUM CHLORIDE, SODIUM CHLORIDE, SODIUM BICARBONATE, POTASSIUM CHLORIDE AND SODIUM PHOSPHATE DIBASIC DIHYDRATE 1500 ML/HR: 3.68; 3.05; 6.34; 3.09; .314; .187 INJECTION INTRAVENOUS at 23:54

## 2024-01-01 RX ADMIN — FAMOTIDINE 20 MG: 10 INJECTION, SOLUTION INTRAVENOUS at 08:49

## 2024-01-01 RX ADMIN — IOPAMIDOL 100 ML: 612 INJECTION, SOLUTION INTRAVENOUS at 11:27

## 2024-01-01 RX ADMIN — Medication 10 ML: at 08:02

## 2024-01-01 RX ADMIN — THIAMINE HYDROCHLORIDE 500 MG: 100 INJECTION, SOLUTION INTRAMUSCULAR; INTRAVENOUS at 23:12

## 2024-01-01 RX ADMIN — FAMOTIDINE 20 MG: 10 INJECTION, SOLUTION INTRAVENOUS at 08:20

## 2024-01-01 RX ADMIN — HEPARIN SODIUM 1000 UNITS: 1000 INJECTION INTRAVENOUS; SUBCUTANEOUS at 15:45

## 2024-01-01 RX ADMIN — CHLORHEXIDINE GLUCONATE 15 ML: 1.2 SOLUTION ORAL at 21:05

## 2024-01-01 RX ADMIN — TRAZODONE HYDROCHLORIDE 100 MG: 100 TABLET ORAL at 22:21

## 2024-01-01 RX ADMIN — LEVOTHYROXINE SODIUM 100 MCG: 0.1 TABLET ORAL at 06:10

## 2024-01-01 RX ADMIN — DEXTROSE AND SODIUM CHLORIDE 75 ML/HR: 5; 450 INJECTION, SOLUTION INTRAVENOUS at 14:26

## 2024-01-01 RX ADMIN — METOCLOPRAMIDE 5 MG: 5 INJECTION, SOLUTION INTRAMUSCULAR; INTRAVENOUS at 14:05

## 2024-01-01 RX ADMIN — IPRATROPIUM BROMIDE AND ALBUTEROL SULFATE 3 ML: 2.5; .5 SOLUTION RESPIRATORY (INHALATION) at 12:35

## 2024-01-01 RX ADMIN — Medication 10 ML: at 08:09

## 2024-01-01 RX ADMIN — PROPOFOL 50 MCG/KG/MIN: 10 INJECTION, EMULSION INTRAVENOUS at 14:26

## 2024-01-01 RX ADMIN — BISOPROLOL FUMARATE 2.5 MG: 5 TABLET ORAL at 08:49

## 2024-01-01 RX ADMIN — IPRATROPIUM BROMIDE AND ALBUTEROL SULFATE 3 ML: 2.5; .5 SOLUTION RESPIRATORY (INHALATION) at 07:07

## 2024-01-01 RX ADMIN — MIDAZOLAM HYDROCHLORIDE 1 MG/HR: 1 INJECTION, SOLUTION INTRAVENOUS at 03:25

## 2024-01-01 RX ADMIN — AMLODIPINE BESYLATE 10 MG: 10 TABLET ORAL at 08:49

## 2024-01-01 RX ADMIN — IPRATROPIUM BROMIDE AND ALBUTEROL SULFATE 3 ML: 2.5; .5 SOLUTION RESPIRATORY (INHALATION) at 07:56

## 2024-01-01 RX ADMIN — CHLORHEXIDINE GLUCONATE 15 ML: 1.2 SOLUTION ORAL at 08:51

## 2024-01-01 RX ADMIN — ALBUTEROL SULFATE 10 MG: 2.5 SOLUTION RESPIRATORY (INHALATION) at 10:48

## 2024-01-01 RX ADMIN — BUDESONIDE 0.5 MG: 0.5 INHALANT ORAL at 07:07

## 2024-01-01 RX ADMIN — FAMOTIDINE 20 MG: 10 INJECTION, SOLUTION INTRAVENOUS at 08:09

## 2024-01-01 RX ADMIN — ASPIRIN 325 MG: 325 TABLET ORAL at 08:20

## 2024-01-01 RX ADMIN — CALCIUM CHLORIDE, MAGNESIUM CHLORIDE, SODIUM CHLORIDE, SODIUM BICARBONATE, POTASSIUM CHLORIDE AND SODIUM PHOSPHATE DIBASIC DIHYDRATE 1500 ML/HR: 3.68; 3.05; 6.34; 3.09; .314; .187 INJECTION INTRAVENOUS at 18:30

## 2024-01-01 RX ADMIN — HUMAN INSULIN 2 UNITS: 100 INJECTION, SOLUTION SUBCUTANEOUS at 23:46

## 2024-01-01 RX ADMIN — POLYETHYLENE GLYCOL 3350 17 G: 17 POWDER, FOR SOLUTION ORAL at 08:02

## 2024-01-01 RX ADMIN — HYDROCODONE BITARTRATE AND ACETAMINOPHEN 1 TABLET: 5; 325 TABLET ORAL at 20:11

## 2024-01-01 RX ADMIN — FUROSEMIDE 80 MG: 10 INJECTION, SOLUTION INTRAMUSCULAR; INTRAVENOUS at 01:21

## 2024-01-01 RX ADMIN — IPRATROPIUM BROMIDE AND ALBUTEROL SULFATE 3 ML: 2.5; .5 SOLUTION RESPIRATORY (INHALATION) at 08:00

## 2024-01-01 RX ADMIN — IPRATROPIUM BROMIDE AND ALBUTEROL SULFATE 3 ML: 2.5; .5 SOLUTION RESPIRATORY (INHALATION) at 01:22

## 2024-01-01 RX ADMIN — BUDESONIDE 0.5 MG: 0.5 INHALANT ORAL at 07:14

## 2024-01-01 RX ADMIN — PROPOFOL 40 MCG/KG/MIN: 10 INJECTION, EMULSION INTRAVENOUS at 01:00

## 2024-01-01 RX ADMIN — Medication 10 ML: at 21:03

## 2024-01-01 RX ADMIN — MIDAZOLAM HYDROCHLORIDE 4 MG: 1 INJECTION, SOLUTION INTRAMUSCULAR; INTRAVENOUS at 12:32

## 2024-01-01 RX ADMIN — SODIUM BICARBONATE 25 MEQ: 84 INJECTION INTRAVENOUS at 09:40

## 2024-01-01 RX ADMIN — CALCIUM CHLORIDE, MAGNESIUM CHLORIDE, SODIUM CHLORIDE, SODIUM BICARBONATE, POTASSIUM CHLORIDE AND SODIUM PHOSPHATE DIBASIC DIHYDRATE 1000 ML/HR: 3.68; 3.05; 6.34; 3.09; .314; .187 INJECTION INTRAVENOUS at 03:47

## 2024-01-01 RX ADMIN — CALCIUM CHLORIDE, MAGNESIUM CHLORIDE, SODIUM CHLORIDE, SODIUM BICARBONATE, POTASSIUM CHLORIDE AND SODIUM PHOSPHATE DIBASIC DIHYDRATE 1500 ML/HR: 3.68; 3.05; 6.34; 3.09; .314; .187 INJECTION INTRAVENOUS at 17:49

## 2024-01-01 RX ADMIN — MIDAZOLAM HYDROCHLORIDE 1 MG: 1 INJECTION, SOLUTION INTRAMUSCULAR; INTRAVENOUS at 07:48

## 2024-01-01 RX ADMIN — AMLODIPINE BESYLATE 10 MG: 10 TABLET ORAL at 12:40

## 2024-01-01 RX ADMIN — SERTRALINE 200 MG: 100 TABLET, FILM COATED ORAL at 08:51

## 2024-01-01 RX ADMIN — BUDESONIDE 0.5 MG: 0.5 INHALANT ORAL at 18:56

## 2024-01-01 RX ADMIN — HYDROCODONE BITARTRATE AND ACETAMINOPHEN 1 TABLET: 5; 325 TABLET ORAL at 04:24

## 2024-01-01 RX ADMIN — DEXTROSE MONOHYDRATE 50 ML/HR: 50 INJECTION, SOLUTION INTRAVENOUS at 05:52

## 2024-01-01 RX ADMIN — LEVOTHYROXINE SODIUM 100 MCG: 0.1 TABLET ORAL at 05:59

## 2024-01-01 RX ADMIN — PIPERACILLIN AND TAZOBACTAM 4.5 G: 4; .5 INJECTION, POWDER, FOR SOLUTION INTRAVENOUS at 21:50

## 2024-01-01 RX ADMIN — HEPARIN SODIUM 5000 UNITS: 5000 INJECTION INTRAVENOUS; SUBCUTANEOUS at 21:50

## 2024-01-01 RX ADMIN — ALBUMIN, HUMAN INJ 5% 250 ML: 5 SOLUTION at 12:51

## 2024-01-01 RX ADMIN — METOCLOPRAMIDE 5 MG: 5 INJECTION, SOLUTION INTRAMUSCULAR; INTRAVENOUS at 05:59

## 2024-01-01 RX ADMIN — POTASSIUM CHLORIDE 20 MEQ: 400 INJECTION, SOLUTION INTRAVENOUS at 22:59

## 2024-01-01 RX ADMIN — CALCIUM CHLORIDE, MAGNESIUM CHLORIDE, SODIUM CHLORIDE, SODIUM BICARBONATE, POTASSIUM CHLORIDE AND SODIUM PHOSPHATE DIBASIC DIHYDRATE 3000 ML/HR: 3.68; 3.05; 6.34; 3.09; .314; .187 INJECTION INTRAVENOUS at 10:33

## 2024-01-01 RX ADMIN — FUROSEMIDE 20 MG: 10 INJECTION, SOLUTION INTRAMUSCULAR; INTRAVENOUS at 10:44

## 2024-01-01 RX ADMIN — GLYCOPYRROLATE 0.4 MG: 0.2 INJECTION INTRAMUSCULAR; INTRAVENOUS at 16:13

## 2024-01-01 RX ADMIN — HEPARIN SODIUM 5000 UNITS: 5000 INJECTION INTRAVENOUS; SUBCUTANEOUS at 15:02

## 2024-01-01 RX ADMIN — IPRATROPIUM BROMIDE AND ALBUTEROL SULFATE 3 ML: 2.5; .5 SOLUTION RESPIRATORY (INHALATION) at 01:16

## 2024-01-01 RX ADMIN — METOLAZONE 10 MG: 5 TABLET ORAL at 01:21

## 2024-01-01 RX ADMIN — PROPOFOL 50 MCG/KG/MIN: 10 INJECTION, EMULSION INTRAVENOUS at 19:43

## 2024-01-01 RX ADMIN — ALBUMIN (HUMAN) 25 G: 0.25 INJECTION, SOLUTION INTRAVENOUS at 13:39

## 2024-01-01 RX ADMIN — Medication 10 ML: at 22:06

## 2024-01-01 RX ADMIN — Medication 30 ML: at 20:29

## 2024-01-01 RX ADMIN — AMLODIPINE BESYLATE 10 MG: 10 TABLET ORAL at 08:09

## 2024-01-01 RX ADMIN — POLYETHYLENE GLYCOL 3350 17 G: 17 POWDER, FOR SOLUTION ORAL at 08:21

## 2024-01-01 RX ADMIN — NICARDIPINE HYDROCHLORIDE 5 MG/HR: 25 INJECTION, SOLUTION INTRAVENOUS at 18:08

## 2024-01-01 RX ADMIN — IPRATROPIUM BROMIDE AND ALBUTEROL SULFATE 3 ML: 2.5; .5 SOLUTION RESPIRATORY (INHALATION) at 07:45

## 2024-01-01 RX ADMIN — METOCLOPRAMIDE 5 MG: 5 INJECTION, SOLUTION INTRAMUSCULAR; INTRAVENOUS at 21:01

## 2024-01-01 RX ADMIN — CALCIUM CHLORIDE, MAGNESIUM CHLORIDE, SODIUM CHLORIDE, SODIUM BICARBONATE, POTASSIUM CHLORIDE AND SODIUM PHOSPHATE DIBASIC DIHYDRATE 3000 ML/HR: 3.68; 3.05; 6.34; 3.09; .314; .187 INJECTION INTRAVENOUS at 12:16

## 2024-01-01 RX ADMIN — MIDAZOLAM HYDROCHLORIDE 2 MG: 1 INJECTION, SOLUTION INTRAMUSCULAR; INTRAVENOUS at 12:14

## 2024-01-01 RX ADMIN — BUDESONIDE 0.5 MG: 0.5 INHALANT ORAL at 18:22

## 2024-01-01 RX ADMIN — HEPARIN SODIUM 5000 UNITS: 5000 INJECTION INTRAVENOUS; SUBCUTANEOUS at 21:53

## 2024-01-01 RX ADMIN — Medication 10 ML: at 08:10

## 2024-01-01 RX ADMIN — MIDAZOLAM HYDROCHLORIDE 2 MG: 1 INJECTION, SOLUTION INTRAMUSCULAR; INTRAVENOUS at 04:35

## 2024-01-01 RX ADMIN — Medication 30 ML: at 08:22

## 2024-01-01 RX ADMIN — NICARDIPINE HYDROCHLORIDE 5 MG/HR: 25 INJECTION, SOLUTION INTRAVENOUS at 08:25

## 2024-01-01 RX ADMIN — Medication 275 MCG/HR: at 02:08

## 2024-01-01 RX ADMIN — SODIUM CHLORIDE 9 ML/HR: 9 INJECTION, SOLUTION INTRAVENOUS at 07:51

## 2024-01-01 RX ADMIN — FAMOTIDINE 20 MG: 10 INJECTION, SOLUTION INTRAVENOUS at 08:12

## 2024-01-01 RX ADMIN — DEXTROSE MONOHYDRATE 50 ML: 25 INJECTION, SOLUTION INTRAVENOUS at 23:15

## 2024-01-01 RX ADMIN — HEPARIN SODIUM 5000 UNITS: 5000 INJECTION INTRAVENOUS; SUBCUTANEOUS at 05:36

## 2024-01-01 RX ADMIN — METOCLOPRAMIDE 5 MG: 5 INJECTION, SOLUTION INTRAMUSCULAR; INTRAVENOUS at 15:01

## 2024-01-01 RX ADMIN — METOCLOPRAMIDE 5 MG: 5 INJECTION, SOLUTION INTRAMUSCULAR; INTRAVENOUS at 14:08

## 2024-01-01 RX ADMIN — CALCIUM CHLORIDE, MAGNESIUM CHLORIDE, SODIUM CHLORIDE, SODIUM BICARBONATE, POTASSIUM CHLORIDE AND SODIUM PHOSPHATE DIBASIC DIHYDRATE 1500 ML/HR: 3.68; 3.05; 6.34; 3.09; .314; .187 INJECTION INTRAVENOUS at 14:24

## 2024-01-01 RX ADMIN — FENTANYL CITRATE 50 MCG: 50 INJECTION, SOLUTION INTRAMUSCULAR; INTRAVENOUS at 17:42

## 2024-01-01 RX ADMIN — PROPOFOL 40 MCG/KG/MIN: 10 INJECTION, EMULSION INTRAVENOUS at 14:39

## 2024-01-01 RX ADMIN — DEXTROSE MONOHYDRATE 50 ML/HR: 50 INJECTION, SOLUTION INTRAVENOUS at 21:50

## 2024-01-01 RX ADMIN — MIDAZOLAM HYDROCHLORIDE 2 MG: 1 INJECTION, SOLUTION INTRAMUSCULAR; INTRAVENOUS at 02:10

## 2024-01-01 RX ADMIN — SODIUM CHLORIDE, POTASSIUM CHLORIDE, SODIUM LACTATE AND CALCIUM CHLORIDE 500 ML: 600; 310; 30; 20 INJECTION, SOLUTION INTRAVENOUS at 12:06

## 2024-01-01 RX ADMIN — IPRATROPIUM BROMIDE AND ALBUTEROL SULFATE 3 ML: 2.5; .5 SOLUTION RESPIRATORY (INHALATION) at 18:52

## 2024-01-01 RX ADMIN — BUMETANIDE 2 MG: 0.25 INJECTION, SOLUTION INTRAMUSCULAR; INTRAVENOUS at 11:14

## 2024-01-01 RX ADMIN — Medication 300 MCG/HR: at 01:55

## 2024-01-01 RX ADMIN — PROPOFOL 35 MCG/KG/MIN: 10 INJECTION, EMULSION INTRAVENOUS at 00:23

## 2024-01-01 RX ADMIN — CALCIUM CHLORIDE, MAGNESIUM CHLORIDE, SODIUM CHLORIDE, SODIUM BICARBONATE, POTASSIUM CHLORIDE AND SODIUM PHOSPHATE DIBASIC DIHYDRATE 1500 ML/HR: 3.68; 3.05; 6.34; 3.09; .314; .187 INJECTION INTRAVENOUS at 15:07

## 2024-01-01 RX ADMIN — Medication 1 APPLICATION: at 20:04

## 2024-01-01 RX ADMIN — TRAZODONE HYDROCHLORIDE 100 MG: 100 TABLET ORAL at 20:11

## 2024-01-01 RX ADMIN — METHYLPREDNISOLONE SODIUM SUCCINATE 60 MG: 125 INJECTION, POWDER, FOR SOLUTION INTRAMUSCULAR; INTRAVENOUS at 03:12

## 2024-01-01 RX ADMIN — Medication 10 ML: at 21:13

## 2024-01-01 RX ADMIN — IPRATROPIUM BROMIDE AND ALBUTEROL SULFATE 3 ML: 2.5; .5 SOLUTION RESPIRATORY (INHALATION) at 00:52

## 2024-01-01 RX ADMIN — IPRATROPIUM BROMIDE AND ALBUTEROL SULFATE 3 ML: 2.5; .5 SOLUTION RESPIRATORY (INHALATION) at 00:39

## 2024-01-01 RX ADMIN — MIDAZOLAM HYDROCHLORIDE 2 MG: 1 INJECTION, SOLUTION INTRAMUSCULAR; INTRAVENOUS at 10:57

## 2024-01-01 RX ADMIN — HYDROCODONE BITARTRATE AND ACETAMINOPHEN 1 TABLET: 5; 325 TABLET ORAL at 15:33

## 2024-01-01 RX ADMIN — METHYLPREDNISOLONE SODIUM SUCCINATE 60 MG: 125 INJECTION, POWDER, FOR SOLUTION INTRAMUSCULAR; INTRAVENOUS at 18:09

## 2024-01-01 RX ADMIN — Medication 300 MCG/HR: at 09:47

## 2024-01-01 RX ADMIN — Medication 50 MCG/HR: at 05:59

## 2024-01-01 RX ADMIN — PIPERACILLIN AND TAZOBACTAM 4.5 G: 4; .5 INJECTION, POWDER, FOR SOLUTION INTRAVENOUS at 05:02

## 2024-01-01 RX ADMIN — IPRATROPIUM BROMIDE AND ALBUTEROL SULFATE 3 ML: 2.5; .5 SOLUTION RESPIRATORY (INHALATION) at 16:56

## 2024-01-01 RX ADMIN — PROPOFOL 50 MCG/KG/MIN: 10 INJECTION, EMULSION INTRAVENOUS at 09:56

## 2024-01-01 RX ADMIN — CHLORHEXIDINE GLUCONATE 15 ML: 1.2 SOLUTION ORAL at 08:26

## 2024-01-01 RX ADMIN — IPRATROPIUM BROMIDE AND ALBUTEROL SULFATE 3 ML: 2.5; .5 SOLUTION RESPIRATORY (INHALATION) at 19:03

## 2024-01-01 RX ADMIN — Medication 125 MCG/HR: at 06:17

## 2024-01-01 RX ADMIN — BISOPROLOL FUMARATE 2.5 MG: 5 TABLET ORAL at 08:20

## 2024-01-01 RX ADMIN — BUDESONIDE 0.5 MG: 0.5 INHALANT ORAL at 07:55

## 2024-01-01 RX ADMIN — ALBUMIN, HUMAN INJ 5% 250 ML: 5 SOLUTION at 12:17

## 2024-01-01 RX ADMIN — FAMOTIDINE 20 MG: 10 INJECTION, SOLUTION INTRAVENOUS at 08:26

## 2024-01-01 RX ADMIN — Medication 30 ML: at 13:53

## 2024-01-01 RX ADMIN — BUDESONIDE 0.5 MG: 0.5 INHALANT ORAL at 07:45

## 2024-01-01 RX ADMIN — INSULIN HUMAN 3.5 UNITS/HR: 1 INJECTION, SOLUTION INTRAVENOUS at 22:55

## 2024-01-01 RX ADMIN — Medication 1 APPLICATION: at 21:03

## 2024-01-01 RX ADMIN — ALBUMIN (HUMAN) 250 ML: 12.5 INJECTION, SOLUTION INTRAVENOUS at 08:48

## 2024-01-01 RX ADMIN — IPRATROPIUM BROMIDE AND ALBUTEROL SULFATE 3 ML: 2.5; .5 SOLUTION RESPIRATORY (INHALATION) at 13:54

## 2024-01-01 RX ADMIN — DEXTROSE AND SODIUM CHLORIDE 100 ML/HR: 5; 450 INJECTION, SOLUTION INTRAVENOUS at 06:53

## 2024-01-01 RX ADMIN — Medication 300 MCG/HR: at 10:27

## 2024-01-01 RX ADMIN — PROPOFOL 45 MCG/KG/MIN: 10 INJECTION, EMULSION INTRAVENOUS at 11:14

## 2024-01-01 RX ADMIN — CHLORHEXIDINE GLUCONATE 15 ML: 1.2 SOLUTION ORAL at 20:04

## 2024-01-01 RX ADMIN — HUMAN INSULIN 2 UNITS: 100 INJECTION, SOLUTION SUBCUTANEOUS at 17:38

## 2024-01-01 RX ADMIN — IPRATROPIUM BROMIDE AND ALBUTEROL SULFATE 3 ML: 2.5; .5 SOLUTION RESPIRATORY (INHALATION) at 00:24

## 2024-01-01 RX ADMIN — IPRATROPIUM BROMIDE AND ALBUTEROL SULFATE 3 ML: 2.5; .5 SOLUTION RESPIRATORY (INHALATION) at 13:51

## 2024-01-01 RX ADMIN — FAMOTIDINE 20 MG: 10 INJECTION, SOLUTION INTRAVENOUS at 08:10

## 2024-01-01 RX ADMIN — Medication 300 MCG/HR: at 00:56

## 2024-01-01 RX ADMIN — IPRATROPIUM BROMIDE AND ALBUTEROL SULFATE 3 ML: 2.5; .5 SOLUTION RESPIRATORY (INHALATION) at 18:22

## 2024-01-01 RX ADMIN — CHLORHEXIDINE GLUCONATE 15 ML: 1.2 SOLUTION ORAL at 08:48

## 2024-01-01 RX ADMIN — HEPARIN SODIUM 5000 UNITS: 5000 INJECTION INTRAVENOUS; SUBCUTANEOUS at 11:00

## 2024-01-23 NOTE — PROGRESS NOTES
Subjective:     Encounter Date:01/24/2024    Primary Care Physician: Frannie Couch MD      Patient ID: Dalila Velez is a 73 y.o. female.    Chief Complaint:Coronary Artery Disease      PROBLEM LIST:  Coronary artery disease:  History of catheter-based interventions; incomplete database.   Normal Regadenoson myocardial perfusion study, January 2015And 2/17 11/2022 normal MPS  Renal artery stenosis:  Status post bilateral renal artery stenting by Dr. Landis in 2012. Right renal artery stented with a 4 mm stent. Left renal was treated with a 5.0 x 15 mm bare-metal stent.   Carotid disease:  Left carotid endarterectomy by Dr. Ramirez, 2006.   11/17.  MRA 70% left ICA, 30% right ICA.  2019 carotid duplex 50 to 69% left ICA, less than 50% right.  1/5/2023 bilateral 50 to 69% ICA disease.  AAA  3.4 cm 1/2020 by CTA  Essential Hypertension.  Dyslipidemia.  Ongoing tobacco abuse.  Peptic ulcer disease.  GERD.  Hypothyroidism with nodule.  Cholecystectomy.   Anxiety/depression.  Left hip replacement 2017  Back surgery      Allergies   Allergen Reactions    Codeine Nausea And Vomiting         Current Outpatient Medications:     aspirin  MG tablet, Take 1 tablet by mouth Daily., Disp: 30 tablet, Rfl: 0    bisoprolol (ZEBeta) 5 MG tablet, Take 1 tablet by mouth Daily., Disp: , Rfl:     diazePAM (VALIUM) 5 MG tablet, Take 1 tablet by mouth Daily As Needed for Anxiety., Disp: , Rfl:     levothyroxine (SYNTHROID, LEVOTHROID) 100 MCG tablet, Take 1 tablet by mouth Daily., Disp: , Rfl:     NIFEdipine CC (ADALAT CC) 30 MG 24 hr tablet, Take 1 tablet by mouth Daily., Disp: , Rfl:     omeprazole (priLOSEC) 40 MG capsule, Take 1 capsule by mouth Daily., Disp: , Rfl:     rosuvastatin (CRESTOR) 40 MG tablet, Take 1 tablet by mouth Every Night., Disp: 90 tablet, Rfl: 3    sertraline (ZOLOFT) 100 MG tablet, Take 2 tablets by mouth Daily., Disp: , Rfl:     traZODone (DESYREL) 150 MG tablet, Take 1 tablet by mouth Every Night.,  "Disp: , Rfl:     nitroglycerin (NITROSTAT) 0.4 MG SL tablet, 1 under the tongue as needed for angina, may repeat q5mins for up three doses (Patient not taking: Reported on 1/24/2024), Disp: 100 tablet, Rfl: 3        History of Present Illness    Patient is a 73-year-old  female who presents today for annual follow-up of coronary disease and peripheral disease.  Since last being seen she notes overall doing well.  She continues to smoke and has no plans for cessation.  Complains of lack of energy but thinks that this is related to her back.  Denies any chest pain, pressure, tightness.  Denies any increasing shortness of breath.  No reported syncope, presyncope, or edema.  Notes that her previous aneurysm has not been checked in 4 years and would like to have this followed up on.  Denies any stroke symptoms.    The following portions of the patient's history were reviewed and updated as appropriate: allergies, current medications, past family history, past medical history, past social history, past surgical history and problem list.      Social History     Tobacco Use    Smoking status: Every Day     Packs/day: 1.00     Years: 40.00     Additional pack years: 0.00     Total pack years: 40.00     Types: Cigarettes    Smokeless tobacco: Never   Substance Use Topics    Alcohol use: No    Drug use: No         ROS       Objective:   /73   Pulse 59   Ht 170.2 cm (67\")   Wt 67.2 kg (148 lb 3.2 oz)   SpO2 97%   BMI 23.21 kg/m²         Vitals reviewed.   Constitutional:       Appearance: Well-developed and not in distress.   Neck:      Vascular: No JVD.      Trachea: No tracheal deviation.   Pulmonary:      Effort: Pulmonary effort is normal.      Breath sounds: Normal breath sounds.   Cardiovascular:      Normal rate. Regular rhythm.      Murmurs: There is no murmur.   Edema:     Peripheral edema absent.   Musculoskeletal:         General: No deformity. Skin:     General: Skin is warm and dry. "   Neurological:      Mental Status: Alert and oriented to person, place, and time.         Procedures          Assessment:   Assessment & Plan      Diagnoses and all orders for this visit:    1. Coronary artery disease involving native coronary artery of native heart without angina pectoris (Primary), stable.  No active angina.  On aspirin.    2. Abdominal aortic aneurysm (AAA) without rupture, unspecified part, no evaluation since 2020.  Still with ongoing tobacco abuse.    3. Bilateral carotid artery disease, unspecified type, stable by carotid last year.  On statin.    4. Essential hypertension, controlled.  On nifedipine.      Plan:  Patient is overall stable from cardiac standpoint.  Check CTA of abdomen and pelvis at patient's convenience for follow-up of aneurysm.  Continue current cardiac medications.  Check carotid duplex prior to next year's appointment.  Follow-up in 1 years time or sooner if abnormal testing.     MORIAH Santos     Advance Care Planning   ACP discussion was held with the patient during this visit. Patient does not have an advance directive, declines further assistance.        Dictated utilizing Dragon dictation

## 2024-01-24 ENCOUNTER — OFFICE VISIT (OUTPATIENT)
Dept: CARDIOLOGY | Facility: CLINIC | Age: 74
End: 2024-01-24
Payer: MEDICARE

## 2024-01-24 VITALS
DIASTOLIC BLOOD PRESSURE: 73 MMHG | SYSTOLIC BLOOD PRESSURE: 121 MMHG | HEIGHT: 67 IN | BODY MASS INDEX: 23.26 KG/M2 | WEIGHT: 148.2 LBS | HEART RATE: 59 BPM | OXYGEN SATURATION: 97 %

## 2024-01-24 DIAGNOSIS — I65.23 CAROTID ARTERY CALCIFICATION, BILATERAL: ICD-10-CM

## 2024-01-24 DIAGNOSIS — I10 ESSENTIAL HYPERTENSION: ICD-10-CM

## 2024-01-24 DIAGNOSIS — I77.9 BILATERAL CAROTID ARTERY DISEASE, UNSPECIFIED TYPE: ICD-10-CM

## 2024-01-24 DIAGNOSIS — I71.40 ABDOMINAL AORTIC ANEURYSM (AAA) WITHOUT RUPTURE, UNSPECIFIED PART: ICD-10-CM

## 2024-01-24 DIAGNOSIS — I25.10 CORONARY ARTERY DISEASE INVOLVING NATIVE CORONARY ARTERY OF NATIVE HEART WITHOUT ANGINA PECTORIS: Primary | ICD-10-CM

## 2024-01-24 PROCEDURE — 1159F MED LIST DOCD IN RCRD: CPT | Performed by: NURSE PRACTITIONER

## 2024-01-24 PROCEDURE — 1160F RVW MEDS BY RX/DR IN RCRD: CPT | Performed by: NURSE PRACTITIONER

## 2024-01-24 PROCEDURE — 3074F SYST BP LT 130 MM HG: CPT | Performed by: NURSE PRACTITIONER

## 2024-01-24 PROCEDURE — 99214 OFFICE O/P EST MOD 30 MIN: CPT | Performed by: NURSE PRACTITIONER

## 2024-01-24 PROCEDURE — 3078F DIAST BP <80 MM HG: CPT | Performed by: NURSE PRACTITIONER

## 2024-01-24 RX ORDER — SERTRALINE HYDROCHLORIDE 100 MG/1
200 TABLET, FILM COATED ORAL DAILY
COMMUNITY
Start: 2023-08-30

## 2024-01-24 NOTE — LETTER
January 24, 2024       No Recipients    Patient: Dalila Velez   YOB: 1950   Date of Visit: 1/24/2024     Dear Frannie Couch MD:       Thank you for referring Dalila Velez to me for evaluation. Below are the relevant portions of my assessment and plan of care.    If you have questions, please do not hesitate to call me. I look forward to following Dalila along with you.         Sincerely,        MORIAH Santos        CC:   No Recipients    Ursula Wilson APRN  01/24/24 1357  Sign when Signing Visit  Subjective:     Encounter Date:01/24/2024    Primary Care Physician: Frannie Couch MD      Patient ID: Dalila Velez is a 73 y.o. female.    Chief Complaint:Coronary Artery Disease      PROBLEM LIST:  Coronary artery disease:  History of catheter-based interventions; incomplete database.   Normal Regadenoson myocardial perfusion study, January 2015And 2/17 11/2022 normal MPS  Renal artery stenosis:  Status post bilateral renal artery stenting by Dr. Landis in 2012. Right renal artery stented with a 4 mm stent. Left renal was treated with a 5.0 x 15 mm bare-metal stent.   Carotid disease:  Left carotid endarterectomy by Dr. Ramirez, 2006.   11/17.  MRA 70% left ICA, 30% right ICA.  2019 carotid duplex 50 to 69% left ICA, less than 50% right.  1/5/2023 bilateral 50 to 69% ICA disease.  AAA  3.4 cm 1/2020 by CTA  Essential Hypertension.  Dyslipidemia.  Ongoing tobacco abuse.  Peptic ulcer disease.  GERD.  Hypothyroidism with nodule.  Cholecystectomy.   Anxiety/depression.  Left hip replacement 2017  Back surgery      Allergies   Allergen Reactions   • Codeine Nausea And Vomiting         Current Outpatient Medications:   •  aspirin  MG tablet, Take 1 tablet by mouth Daily., Disp: 30 tablet, Rfl: 0  •  bisoprolol (ZEBeta) 5 MG tablet, Take 1 tablet by mouth Daily., Disp: , Rfl:   •  diazePAM (VALIUM) 5 MG tablet, Take 1 tablet by mouth Daily As Needed for Anxiety., Disp: , Rfl:   •   "levothyroxine (SYNTHROID, LEVOTHROID) 100 MCG tablet, Take 1 tablet by mouth Daily., Disp: , Rfl:   •  NIFEdipine CC (ADALAT CC) 30 MG 24 hr tablet, Take 1 tablet by mouth Daily., Disp: , Rfl:   •  omeprazole (priLOSEC) 40 MG capsule, Take 1 capsule by mouth Daily., Disp: , Rfl:   •  rosuvastatin (CRESTOR) 40 MG tablet, Take 1 tablet by mouth Every Night., Disp: 90 tablet, Rfl: 3  •  sertraline (ZOLOFT) 100 MG tablet, Take 2 tablets by mouth Daily., Disp: , Rfl:   •  traZODone (DESYREL) 150 MG tablet, Take 1 tablet by mouth Every Night., Disp: , Rfl:   •  nitroglycerin (NITROSTAT) 0.4 MG SL tablet, 1 under the tongue as needed for angina, may repeat q5mins for up three doses (Patient not taking: Reported on 1/24/2024), Disp: 100 tablet, Rfl: 3        History of Present Illness    Patient is a 73-year-old  female who presents today for annual follow-up of coronary disease and peripheral disease.  Since last being seen she notes overall doing well.  She continues to smoke and has no plans for cessation.  Complains of lack of energy but thinks that this is related to her back.  Denies any chest pain, pressure, tightness.  Denies any increasing shortness of breath.  No reported syncope, presyncope, or edema.  Notes that her previous aneurysm has not been checked in 4 years and would like to have this followed up on.  Denies any stroke symptoms.    The following portions of the patient's history were reviewed and updated as appropriate: allergies, current medications, past family history, past medical history, past social history, past surgical history and problem list.      Social History     Tobacco Use   • Smoking status: Every Day     Packs/day: 1.00     Years: 40.00     Additional pack years: 0.00     Total pack years: 40.00     Types: Cigarettes   • Smokeless tobacco: Never   Substance Use Topics   • Alcohol use: No   • Drug use: No         ROS       Objective:   /73   Pulse 59   Ht 170.2 cm (67\")  "  Wt 67.2 kg (148 lb 3.2 oz)   SpO2 97%   BMI 23.21 kg/m²         Vitals reviewed.   Constitutional:       Appearance: Well-developed and not in distress.   Neck:      Vascular: No JVD.      Trachea: No tracheal deviation.   Pulmonary:      Effort: Pulmonary effort is normal.      Breath sounds: Normal breath sounds.   Cardiovascular:      Normal rate. Regular rhythm.      Murmurs: There is no murmur.   Edema:     Peripheral edema absent.   Musculoskeletal:         General: No deformity. Skin:     General: Skin is warm and dry.   Neurological:      Mental Status: Alert and oriented to person, place, and time.         Procedures          Assessment:   Assessment & Plan     Diagnoses and all orders for this visit:    1. Coronary artery disease involving native coronary artery of native heart without angina pectoris (Primary), stable.  No active angina.  On aspirin.    2. Abdominal aortic aneurysm (AAA) without rupture, unspecified part, no evaluation since 2020.  Still with ongoing tobacco abuse.    3. Bilateral carotid artery disease, unspecified type, stable by carotid last year.  On statin.    4. Essential hypertension, controlled.  On nifedipine.      Plan:  Patient is overall stable from cardiac standpoint.  Check CTA of abdomen and pelvis at patient's convenience for follow-up of aneurysm.  Continue current cardiac medications.  Check carotid duplex prior to next year's appointment.  Follow-up in 1 years time or sooner if abnormal testing.     MORIAH Santos     Advance Care Planning  ACP discussion was held with the patient during this visit. Patient does not have an advance directive, declines further assistance.        Dictated utilizing Dragon dictation

## 2024-03-12 ENCOUNTER — TELEPHONE (OUTPATIENT)
Dept: CARDIOLOGY | Facility: CLINIC | Age: 74
End: 2024-03-12
Payer: MEDICARE

## 2024-03-12 NOTE — TELEPHONE ENCOUNTER
Caller: Dalila Velez    Relationship: Self    Best call back number: [phone]     What orders are you requesting (i.e. lab or imaging): IMAGING     In what timeframe would the patient need to come in: ASAP    Where will you receive your lab/imaging services: [facility]     Additional notes: PATIENT CALLED IN TO REQUEST AN ORDER BE SENT OVER TO [facility] TO HAVE HER AORTIC ANEURYSM CHECKED.

## 2024-03-14 NOTE — TELEPHONE ENCOUNTER
Caller: Dalila Velez    Relationship: Self    Best call back number: [phone]    What is the best time to reach you: ANYTIME     Who are you requesting to speak with (clinical staff, provider,  specific staff member): ANYONE     What was the call regarding: PATIENT WOULD LIKE A CALL BACK WITH AN UPDATE ON THE ORDER FOR HER AORTIC ANEURYSM.     Is it okay if the provider responds through MyChart: NO

## 2024-04-10 ENCOUNTER — HOSPITAL ENCOUNTER (OUTPATIENT)
Dept: CT IMAGING | Facility: HOSPITAL | Age: 74
Discharge: HOME | End: 2024-04-10
Admitting: NURSE PRACTITIONER
Payer: MEDICARE

## 2024-04-10 DIAGNOSIS — I71.40 ABDOMINAL AORTIC ANEURYSM (AAA) WITHOUT RUPTURE, UNSPECIFIED PART: ICD-10-CM

## 2024-04-10 LAB — CREAT BLDA-MCNC: 1.5 MG/DL (ref 0.6–1.3)

## 2024-04-10 PROCEDURE — 25510000001 IOPAMIDOL PER 1 ML: Performed by: NURSE PRACTITIONER

## 2024-04-10 PROCEDURE — 74174 CTA ABD&PLVS W/CONTRAST: CPT

## 2024-04-10 PROCEDURE — 82565 ASSAY OF CREATININE: CPT

## 2024-04-10 RX ADMIN — IOPAMIDOL 95 ML: 755 INJECTION, SOLUTION INTRAVENOUS at 13:39

## 2024-04-12 ENCOUNTER — TELEPHONE (OUTPATIENT)
Dept: CARDIOLOGY | Facility: HOSPITAL | Age: 74
End: 2024-04-12
Payer: MEDICARE

## 2024-04-12 NOTE — TELEPHONE ENCOUNTER
Returned call of patient. Advised patient that formal result of CT scan last week has not been received. Informed patient that we will call her as soon as result was evaluated by Dr. Nixon. Verbalized understanding.

## 2024-04-16 ENCOUNTER — TELEPHONE (OUTPATIENT)
Dept: CARDIOLOGY | Facility: CLINIC | Age: 74
End: 2024-04-16
Payer: MEDICARE

## 2024-04-16 NOTE — TELEPHONE ENCOUNTER
Spoke with patient, advised of below.  She is agreeable to plan.  ----- Message from MORIAH Santos sent at 4/15/2024  4:46 PM EDT -----  Patient has enlargement of her abdominal aortic aneurysm.  Now requires surgical evaluation.  Would refer to vascular surgery.  If she does not have a preference would recommend Dr. Reyes.

## 2024-05-29 ENCOUNTER — PRE-ADMISSION TESTING (OUTPATIENT)
Dept: PREADMISSION TESTING | Facility: HOSPITAL | Age: 74
End: 2024-05-29
Payer: MEDICARE

## 2024-05-29 VITALS — WEIGHT: 144.07 LBS | BODY MASS INDEX: 22.61 KG/M2 | HEIGHT: 67 IN

## 2024-05-29 LAB
ABO GROUP BLD: NORMAL
ANION GAP SERPL CALCULATED.3IONS-SCNC: 11 MMOL/L (ref 5–15)
BASOPHILS # BLD AUTO: 0.03 10*3/MM3 (ref 0–0.2)
BASOPHILS NFR BLD AUTO: 0.4 % (ref 0–1.5)
BUN SERPL-MCNC: 21 MG/DL (ref 8–23)
BUN/CREAT SERPL: 15 (ref 7–25)
CALCIUM SPEC-SCNC: 8.9 MG/DL (ref 8.6–10.5)
CHLORIDE SERPL-SCNC: 108 MMOL/L (ref 98–107)
CO2 SERPL-SCNC: 23 MMOL/L (ref 22–29)
CREAT SERPL-MCNC: 1.4 MG/DL (ref 0.57–1)
DEPRECATED RDW RBC AUTO: 50.3 FL (ref 37–54)
EGFRCR SERPLBLD CKD-EPI 2021: 39.8 ML/MIN/1.73
EOSINOPHIL # BLD AUTO: 0.1 10*3/MM3 (ref 0–0.4)
EOSINOPHIL NFR BLD AUTO: 1.5 % (ref 0.3–6.2)
ERYTHROCYTE [DISTWIDTH] IN BLOOD BY AUTOMATED COUNT: 15.5 % (ref 12.3–15.4)
GLUCOSE SERPL-MCNC: 112 MG/DL (ref 65–99)
HBA1C MFR BLD: 5.5 % (ref 4.8–5.6)
HCT VFR BLD AUTO: 41.3 % (ref 34–46.6)
HGB BLD-MCNC: 12.9 G/DL (ref 12–15.9)
IMM GRANULOCYTES # BLD AUTO: 0.02 10*3/MM3 (ref 0–0.05)
IMM GRANULOCYTES NFR BLD AUTO: 0.3 % (ref 0–0.5)
LYMPHOCYTES # BLD AUTO: 1.38 10*3/MM3 (ref 0.7–3.1)
LYMPHOCYTES NFR BLD AUTO: 20.3 % (ref 19.6–45.3)
MCH RBC QN AUTO: 27.6 PG (ref 26.6–33)
MCHC RBC AUTO-ENTMCNC: 31.2 G/DL (ref 31.5–35.7)
MCV RBC AUTO: 88.4 FL (ref 79–97)
MONOCYTES # BLD AUTO: 0.34 10*3/MM3 (ref 0.1–0.9)
MONOCYTES NFR BLD AUTO: 5 % (ref 5–12)
NEUTROPHILS NFR BLD AUTO: 4.93 10*3/MM3 (ref 1.7–7)
NEUTROPHILS NFR BLD AUTO: 72.5 % (ref 42.7–76)
NRBC BLD AUTO-RTO: 0 /100 WBC (ref 0–0.2)
PLATELET # BLD AUTO: 220 10*3/MM3 (ref 140–450)
PMV BLD AUTO: 11.1 FL (ref 6–12)
POTASSIUM SERPL-SCNC: 4.2 MMOL/L (ref 3.5–5.2)
RBC # BLD AUTO: 4.67 10*6/MM3 (ref 3.77–5.28)
RH BLD: NEGATIVE
SODIUM SERPL-SCNC: 142 MMOL/L (ref 136–145)
WBC NRBC COR # BLD AUTO: 6.8 10*3/MM3 (ref 3.4–10.8)

## 2024-05-29 PROCEDURE — 86900 BLOOD TYPING SEROLOGIC ABO: CPT

## 2024-05-29 PROCEDURE — 86901 BLOOD TYPING SEROLOGIC RH(D): CPT

## 2024-05-29 PROCEDURE — 83036 HEMOGLOBIN GLYCOSYLATED A1C: CPT

## 2024-05-29 PROCEDURE — 36415 COLL VENOUS BLD VENIPUNCTURE: CPT

## 2024-05-29 PROCEDURE — 93010 ELECTROCARDIOGRAM REPORT: CPT | Performed by: INTERNAL MEDICINE

## 2024-05-29 PROCEDURE — 85025 COMPLETE CBC W/AUTO DIFF WBC: CPT

## 2024-05-29 PROCEDURE — 93005 ELECTROCARDIOGRAM TRACING: CPT

## 2024-05-29 PROCEDURE — 80048 BASIC METABOLIC PNL TOTAL CA: CPT

## 2024-05-29 RX ORDER — CYCLOPENTOLATE HYDROCHLORIDE 10 MG/ML
1 SOLUTION/ DROPS OPHTHALMIC 3 TIMES DAILY
Status: ON HOLD | COMMUNITY
End: 2024-06-05

## 2024-05-29 RX ORDER — TRIAMCINOLONE ACETONIDE 1 MG/G
1 CREAM TOPICAL 2 TIMES DAILY
Status: ON HOLD | COMMUNITY
End: 2024-06-05

## 2024-05-29 RX ORDER — POLYETHYLENE GLYCOL 3350 17 G/17G
17 POWDER, FOR SOLUTION ORAL DAILY
Status: ON HOLD | COMMUNITY

## 2024-05-29 RX ORDER — ERGOCALCIFEROL 1.25 MG/1
1 CAPSULE ORAL WEEKLY
Status: ON HOLD | COMMUNITY

## 2024-05-29 NOTE — PAT
An arrival time for procedure was not provided during PAT visit. If patient had any questions or concerns about their arrival time, they were instructed to contact their surgeon/physician.  Additionally, if the patient referred to an arrival time that was acquired from their my chart account, patient was encouraged to verify that time with their surgeon/physician. Arrival times are NOT provided in Pre Admission Testing Department.    Patient to apply Chlorhexadine wipes  to surgical area (as instructed) the night before procedure and the AM of procedure. Wipes provided.    One-on-one Pre Admission Testing general education provided during PAT visit.  Copies of PAT general education handouts (Incentive Spirometry, Meds to Beds Program, Patient Belongings, Pre-op skin preparation instructions, Blood Glucose testing, Visitor policy, Surgery FAQ, Code H) distributed to patient. Encouraged patient/family to read PAT general education handouts thoroughly and notify PAT staff with any questions or concerns. Patient instructed to bring PAT pass and completed skin prep sheet (if applicable) on the day of procedure. Patient verbalized understanding of all information and priority content.     No order for procedure consent.  Please obtain procedure consent on the day of procedure.    Cardiac clearance in chart from 1/24/24.

## 2024-05-30 LAB
QT INTERVAL: 452 MS
QTC INTERVAL: 408 MS

## 2024-06-04 ENCOUNTER — ANESTHESIA EVENT (OUTPATIENT)
Dept: PERIOP | Facility: HOSPITAL | Age: 74
End: 2024-06-04
Payer: MEDICARE

## 2024-06-05 ENCOUNTER — ANESTHESIA (OUTPATIENT)
Dept: PERIOP | Facility: HOSPITAL | Age: 74
End: 2024-06-05
Payer: MEDICARE

## 2024-06-05 ENCOUNTER — ANESTHESIA EVENT (OUTPATIENT)
Dept: PERIOP | Facility: HOSPITAL | Age: 74
End: 2024-06-05
Payer: MEDICARE

## 2024-06-05 PROBLEM — D62 ACUTE BLOOD LOSS ANEMIA: Status: ACTIVE | Noted: 2024-06-05

## 2024-06-05 PROBLEM — I71.42 JUXTARENAL ABDOMINAL AORTIC ANEURYSM (AAA) WITHOUT RUPTURE: Status: ACTIVE | Noted: 2024-06-05

## 2024-06-05 PROCEDURE — 25810000003 SODIUM CHLORIDE 0.9 % SOLUTION 250 ML FLEX CONT: Performed by: NURSE ANESTHETIST, CERTIFIED REGISTERED

## 2024-06-05 PROCEDURE — P9041 ALBUMIN (HUMAN),5%, 50ML: HCPCS | Performed by: NURSE ANESTHETIST, CERTIFIED REGISTERED

## 2024-06-05 PROCEDURE — 25010000002 FENTANYL CITRATE (PF) 100 MCG/2ML SOLUTION: Performed by: NURSE ANESTHETIST, CERTIFIED REGISTERED

## 2024-06-05 PROCEDURE — 25810000003 SODIUM CHLORIDE 0.9 % SOLUTION: Performed by: ANESTHESIOLOGY

## 2024-06-05 PROCEDURE — 25010000002 PHENYLEPHRINE 10 MG/ML SOLUTION 1 ML VIAL: Performed by: NURSE ANESTHETIST, CERTIFIED REGISTERED

## 2024-06-05 PROCEDURE — 25010000002 NICARDIPINE 2.5 MG/ML SOLUTION 10 ML VIAL: Performed by: NURSE ANESTHETIST, CERTIFIED REGISTERED

## 2024-06-05 PROCEDURE — 25010000002 PROPOFOL 10 MG/ML EMULSION: Performed by: NURSE ANESTHETIST, CERTIFIED REGISTERED

## 2024-06-05 PROCEDURE — 25010000002 ALBUMIN HUMAN 5% PER 50 ML: Performed by: NURSE ANESTHETIST, CERTIFIED REGISTERED

## 2024-06-05 PROCEDURE — 25010000002 SUCCINYLCHOLINE PER 20 MG: Performed by: ANESTHESIOLOGY

## 2024-06-05 PROCEDURE — 25010000002 CEFAZOLIN PER 500 MG: Performed by: ANESTHESIOLOGY

## 2024-06-05 PROCEDURE — 25010000002 HEPARIN (PORCINE) PER 1000 UNITS: Performed by: NURSE ANESTHETIST, CERTIFIED REGISTERED

## 2024-06-05 PROCEDURE — 25010000002 HEPARIN (PORCINE) PER 1000 UNITS: Performed by: ANESTHESIOLOGY

## 2024-06-05 PROCEDURE — 25010000002 CEFAZOLIN PER 500 MG: Performed by: SURGERY

## 2024-06-05 PROCEDURE — 25010000002 PROTAMINE SULFATE PER 10 MG: Performed by: NURSE ANESTHETIST, CERTIFIED REGISTERED

## 2024-06-05 RX ORDER — PROTAMINE SULFATE 10 MG/ML
INJECTION, SOLUTION INTRAVENOUS AS NEEDED
Status: DISCONTINUED | OUTPATIENT
Start: 2024-06-05 | End: 2024-06-05 | Stop reason: SURG

## 2024-06-05 RX ORDER — PROPOFOL 10 MG/ML
VIAL (ML) INTRAVENOUS AS NEEDED
Status: DISCONTINUED | OUTPATIENT
Start: 2024-06-05 | End: 2024-06-05 | Stop reason: SURG

## 2024-06-05 RX ORDER — ETOMIDATE 2 MG/ML
INJECTION INTRAVENOUS AS NEEDED
Status: DISCONTINUED | OUTPATIENT
Start: 2024-06-05 | End: 2024-06-05 | Stop reason: SURG

## 2024-06-05 RX ORDER — CEFAZOLIN SODIUM 1 G/3ML
INJECTION, POWDER, FOR SOLUTION INTRAMUSCULAR; INTRAVENOUS AS NEEDED
Status: DISCONTINUED | OUTPATIENT
Start: 2024-06-05 | End: 2024-06-05 | Stop reason: SURG

## 2024-06-05 RX ORDER — ALBUMIN, HUMAN INJ 5% 5 %
SOLUTION INTRAVENOUS CONTINUOUS PRN
Status: DISCONTINUED | OUTPATIENT
Start: 2024-06-05 | End: 2024-06-05 | Stop reason: SURG

## 2024-06-05 RX ORDER — CALCIUM CHLORIDE 100 MG/ML
INJECTION INTRAVENOUS; INTRAVENTRICULAR AS NEEDED
Status: DISCONTINUED | OUTPATIENT
Start: 2024-06-05 | End: 2024-06-05 | Stop reason: SURG

## 2024-06-05 RX ORDER — LIDOCAINE HYDROCHLORIDE 10 MG/ML
INJECTION, SOLUTION EPIDURAL; INFILTRATION; INTRACAUDAL; PERINEURAL AS NEEDED
Status: DISCONTINUED | OUTPATIENT
Start: 2024-06-05 | End: 2024-06-05 | Stop reason: SURG

## 2024-06-05 RX ORDER — FENTANYL CITRATE 50 UG/ML
INJECTION, SOLUTION INTRAMUSCULAR; INTRAVENOUS AS NEEDED
Status: DISCONTINUED | OUTPATIENT
Start: 2024-06-05 | End: 2024-06-05 | Stop reason: SURG

## 2024-06-05 RX ORDER — HEPARIN SODIUM 1000 [USP'U]/ML
INJECTION, SOLUTION INTRAVENOUS; SUBCUTANEOUS AS NEEDED
Status: DISCONTINUED | OUTPATIENT
Start: 2024-06-05 | End: 2024-06-05 | Stop reason: SURG

## 2024-06-05 RX ORDER — SODIUM CHLORIDE 9 MG/ML
INJECTION, SOLUTION INTRAVENOUS CONTINUOUS PRN
Status: DISCONTINUED | OUTPATIENT
Start: 2024-06-05 | End: 2024-06-05 | Stop reason: SURG

## 2024-06-05 RX ORDER — ROCURONIUM BROMIDE 10 MG/ML
INJECTION, SOLUTION INTRAVENOUS AS NEEDED
Status: DISCONTINUED | OUTPATIENT
Start: 2024-06-05 | End: 2024-06-05 | Stop reason: SURG

## 2024-06-05 RX ORDER — SUCCINYLCHOLINE CHLORIDE 20 MG/ML
INJECTION INTRAMUSCULAR; INTRAVENOUS AS NEEDED
Status: DISCONTINUED | OUTPATIENT
Start: 2024-06-05 | End: 2024-06-05 | Stop reason: SURG

## 2024-06-05 RX ADMIN — ROCURONIUM BROMIDE 30 MG: 10 SOLUTION INTRAVENOUS at 16:02

## 2024-06-05 RX ADMIN — SUCCINYLCHOLINE CHLORIDE 100 MG: 20 INJECTION, SOLUTION INTRAMUSCULAR; INTRAVENOUS at 14:03

## 2024-06-05 RX ADMIN — HEPARIN SODIUM 5000 UNITS: 1000 INJECTION INTRAVENOUS; SUBCUTANEOUS at 15:19

## 2024-06-05 RX ADMIN — SODIUM BICARBONATE 50 MEQ: 84 INJECTION INTRAVENOUS at 15:30

## 2024-06-05 RX ADMIN — PROTAMINE SULFATE 50 MG: 10 INJECTION, SOLUTION INTRAVENOUS at 09:44

## 2024-06-05 RX ADMIN — NICARDIPINE HYDROCHLORIDE 5 MG/HR: 25 INJECTION, SOLUTION INTRAVENOUS at 08:27

## 2024-06-05 RX ADMIN — PHENYLEPHRINE HYDROCHLORIDE 1 MCG/KG/MIN: 10 INJECTION INTRAVENOUS at 13:55

## 2024-06-05 RX ADMIN — HEPARIN SODIUM 5000 UNITS: 1000 INJECTION INTRAVENOUS; SUBCUTANEOUS at 14:18

## 2024-06-05 RX ADMIN — PROPOFOL 60 MG: 10 INJECTION, EMULSION INTRAVENOUS at 08:02

## 2024-06-05 RX ADMIN — SODIUM BICARBONATE 50 MEQ: 84 INJECTION INTRAVENOUS at 14:52

## 2024-06-05 RX ADMIN — ROCURONIUM BROMIDE 50 MG: 10 SOLUTION INTRAVENOUS at 14:09

## 2024-06-05 RX ADMIN — SODIUM BICARBONATE 50 MEQ: 84 INJECTION INTRAVENOUS at 14:49

## 2024-06-05 RX ADMIN — PROPOFOL 100 MCG/KG/MIN: 10 INJECTION, EMULSION INTRAVENOUS at 08:03

## 2024-06-05 RX ADMIN — LIDOCAINE HYDROCHLORIDE 50 MG: 10 INJECTION, SOLUTION EPIDURAL; INFILTRATION; INTRACAUDAL; PERINEURAL at 08:02

## 2024-06-05 RX ADMIN — ROCURONIUM BROMIDE 50 MG: 10 SOLUTION INTRAVENOUS at 16:18

## 2024-06-05 RX ADMIN — ALBUMIN (HUMAN): 12.5 INJECTION, SOLUTION INTRAVENOUS at 14:57

## 2024-06-05 RX ADMIN — ETOMIDATE 20 MG: 20 INJECTION, SOLUTION INTRAVENOUS at 14:03

## 2024-06-05 RX ADMIN — SODIUM CHLORIDE: 9 INJECTION, SOLUTION INTRAVENOUS at 16:10

## 2024-06-05 RX ADMIN — CALCIUM CHLORIDE 0.25 G: 100 INJECTION INTRAVENOUS; INTRAVENTRICULAR at 14:48

## 2024-06-05 RX ADMIN — HEPARIN SODIUM 6000 UNITS: 1000 INJECTION INTRAVENOUS; SUBCUTANEOUS at 08:17

## 2024-06-05 RX ADMIN — SODIUM BICARBONATE 50 MEQ: 84 INJECTION INTRAVENOUS at 15:25

## 2024-06-05 RX ADMIN — HEPARIN SODIUM 3000 UNITS: 1000 INJECTION INTRAVENOUS; SUBCUTANEOUS at 09:19

## 2024-06-05 RX ADMIN — FENTANYL CITRATE 100 MCG: 50 INJECTION, SOLUTION INTRAMUSCULAR; INTRAVENOUS at 08:02

## 2024-06-05 RX ADMIN — ALBUMIN (HUMAN): 12.5 INJECTION, SOLUTION INTRAVENOUS at 14:50

## 2024-06-05 RX ADMIN — CALCIUM CHLORIDE 0.25 G: 100 INJECTION INTRAVENOUS; INTRAVENTRICULAR at 15:15

## 2024-06-05 RX ADMIN — PROTAMINE SULFATE 50 MG: 10 INJECTION, SOLUTION INTRAVENOUS at 16:18

## 2024-06-05 RX ADMIN — SODIUM BICARBONATE 50 MEQ: 84 INJECTION INTRAVENOUS at 14:48

## 2024-06-05 RX ADMIN — ROCURONIUM BROMIDE 20 MG: 10 SOLUTION INTRAVENOUS at 14:57

## 2024-06-05 RX ADMIN — CEFAZOLIN 2 G: 1 INJECTION, POWDER, FOR SOLUTION INTRAMUSCULAR; INTRAVENOUS at 14:05

## 2024-06-05 RX ADMIN — SODIUM CHLORIDE 2000 MG: 900 INJECTION INTRAVENOUS at 08:02

## 2024-06-05 RX ADMIN — SODIUM BICARBONATE 50 MEQ: 84 INJECTION INTRAVENOUS at 14:55

## 2024-06-05 RX ADMIN — CALCIUM CHLORIDE 0.5 G: 100 INJECTION INTRAVENOUS; INTRAVENTRICULAR at 15:42

## 2024-06-05 RX ADMIN — SODIUM CHLORIDE: 9 INJECTION, SOLUTION INTRAVENOUS at 13:55

## 2024-06-05 NOTE — ANESTHESIA PREPROCEDURE EVALUATION
Anesthesia Evaluation     Patient summary reviewed and Nursing notes reviewed   no history of anesthetic complications:   NPO Solid Status: Waived due to emergency  NPO Liquid Status: Waived due to emergency           Airway   Mallampati: II  TM distance: >3 FB  Neck ROM: full  No difficulty expected  Dental - normal exam     Pulmonary - negative pulmonary ROS and normal exam    breath sounds clear to auscultation  Cardiovascular - normal exam    ECG reviewed  Rhythm: regular  Rate: normal    (+) hypertension, CAD, PVD (Post-endo AAA bleed),  carotid artery disease      Neuro/Psych- negative ROS  GI/Hepatic/Renal/Endo    (+) GERD, renal disease-    Musculoskeletal     Abdominal    Substance History      OB/GYN          Other - negative ROS                   Anesthesia Plan    ASA 4 - emergent     general   Rapid sequence  intravenous induction     Anesthetic plan, risks, benefits, and alternatives have been provided, discussed and informed consent has been obtained with: patient.    Plan discussed with CRNA.    CODE STATUS:

## 2024-06-05 NOTE — ANESTHESIA PROCEDURE NOTES
Airway  Urgency: elective    Date/Time: 6/5/2024 2:04 PM  Airway not difficult    General Information and Staff    Patient location during procedure: OR  Anesthesiologist: Huy Sargent MD    Indications and Patient Condition  Indications for airway management: airway protection    Preoxygenated: yes  MILS not maintained throughout  Mask difficulty assessment: 1 - vent by mask    Final Airway Details  Final airway type: endotracheal airway      Successful airway: ETT  Cuffed: yes   Successful intubation technique: direct laryngoscopy  Endotracheal tube insertion site: oral  Blade: Keshav  Blade size: 3  ETT size (mm): 7.0  Cormack-Lehane Classification: grade IIb - view of arytenoids or posterior of glottis only  Placement verified by: chest auscultation and capnometry   Measured from: lips  ETT/EBT  to lips (cm): 20  Number of attempts at approach: 1  Assessment: lips, teeth, and gum same as pre-op and atraumatic intubation    Additional Comments  Negative epigastric sounds, Breath sound equal bilaterally with symmetric chest rise and fall

## 2024-06-05 NOTE — PROGRESS NOTES
"Intensive Care Follow-up     Hospital:  LOS: 0 days   Ms. Dalila Velez, 74 y.o. 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  Initial surgery was completed without complication however the patient began to become hemodynamically unstable and was found to have a hemoglobin of 6.6 down from 12.9 g preoperatively.  She was taken emergently back to the operating room and a leak from the renal artery was repaired.  She has received a total of 7 units of packed red blood cells as well as associated plasma and cryo.  She is brought to the intensive care unit in an intubated state for recovery.    The patient's past medical, surgical and social history were reviewed and updated in Epic as appropriate.        Objective     Infusions:  niCARdipine, 5-15 mg/hr, Last Rate: Stopped (06/05/24 1118)  sodium chloride, 9 mL/hr, Last Rate: 9 mL/hr (06/05/24 0305)      Medications:  phenylephrine, , ,         Vital Sign Min/Max for last 24 hours  Temp  Min: 98.2 °F (36.8 °C)  Max: 98.7 °F (37.1 °C)   BP  Min: 72/25  Max: 153/66   Pulse  Min: 48  Max: 98   Resp  Min: 10  Max: 16   SpO2  Min: 88 %  Max: 100 %   Flow (L/min)  Min: 6  Max: 6       Input/Output for last 24 hour shift  No intake/output data recorded.   S RR:  [9-14] 12  Objective:  General Appearance:  Uncomfortable and ill-appearing (Sedated on the ventilator.).    Vital signs: (most recent): Blood pressure 115/87, pulse 80, temperature 98.7 °F (37.1 °C), temperature source Temporal, resp. rate 10, height 168.9 cm (66.5\"), weight 65.4 kg (144 lb 1.1 oz), SpO2 93%.  " "  HEENT: (Endotracheal tube in place.)    Lungs:  Normal effort and normal respiratory rate.  Breath sounds clear to auscultation.    Heart: Normal rate.  Regular rhythm.  S1 normal and S2 normal.    Abdomen: (Abdomen somewhat distended though not tight.  No pulsatile masses felt.  Scant if any bowel sounds heard.).    Extremities: There is no dependent edema.    Neurological: (Currently sedated).    Pupils:  Pupils are equal, round, and reactive to light.  Pupils are equal.   Skin:  Warm and pale.                Results from last 7 days   Lab Units 06/05/24  1236   HEMOGLOBIN g/dL 6.6*           Invalid input(s): \"CHLORIDE\"  Estimated Creatinine Clearance: 36.9 mL/min (A) (by C-G formula based on SCr of 1.4 mg/dL (H)).              I reviewed the patient's results and images.     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  Chest x-ray will be performed to verify placement of endotracheal tube.  Maintain sedation with propofol.  Will extubate when patient is a bit more stable.  Pressor support as necessary.  Follow-up labs later on this evening.    Plan of care and goals reviewed with mulitdisciplinary/antibiotic stewardship team during rounds.   I discussed the patient's findings and my recommendations with nursing staff     High level of risk due to:  drug(s) requiring intensive monitoring for toxicity, parenteral controlled substances, and decision regarding escalation of level of hospital care.      Saúl Cao MD, [facility]  Pulmonology and Critical Care Medicine     "

## 2024-06-05 NOTE — ANESTHESIA POSTPROCEDURE EVALUATION
Patient: Dalila Velez    Procedure Summary       Date: 06/05/24 Room / Location: [facility] OR 02 / [facility] HYBRID OR    Anesthesia Start: 0755 Anesthesia Stop: 1001    Procedure: ENDOVASCULAR REPAIR OF AORTIC ANEURYSM (Abdomen) Diagnosis:     Surgeons: Theodore Reyes MD Provider: Jose Mckeon MD    Anesthesia Type: general, Sydnee ASA Status: 3            Anesthesia Type: general, Raymond    Vitals  Vitals Value Taken Time   /56 06/05/24 0956   Temp     Pulse 68 06/05/24 0959   Resp     SpO2 83 % 06/05/24 0959   Vitals shown include unfiled device data.        Post Anesthesia Care and Evaluation    Patient location during evaluation: PACU  Patient participation: complete - patient participated  Level of consciousness: awake  Pain score: 0  Pain management: adequate    Airway patency: patent  Anesthetic complications: No anesthetic complications  PONV Status: none  Cardiovascular status: acceptable and stable  Respiratory status: nasal cannula, unassisted, acceptable and spontaneous ventilation  Hydration status: acceptable

## 2024-06-05 NOTE — ANESTHESIA PREPROCEDURE EVALUATION
Anesthesia Evaluation     Patient summary reviewed and Nursing notes reviewed   NPO Solid Status: > 8 hours  NPO Liquid Status: > 8 hours           Airway   Mallampati: II  TM distance: >3 FB  Neck ROM: full  No difficulty expected  Dental - normal exam     Pulmonary - normal exam   (+) a smoker Current,  Cardiovascular - normal exam    (+) hypertension, CAD, PVD,  carotid artery disease      Neuro/Psych  (+) psychiatric history Anxiety and Depression  GI/Hepatic/Renal/Endo    (+) GERD, PUD, renal disease (RENAL ARTERY STENT)-, thyroid problem hypothyroidism    Musculoskeletal (-) negative ROS    Abdominal  - normal exam    Bowel sounds: normal.   Substance History - negative use     OB/GYN negative ob/gyn ROS         Other                    Anesthesia Plan    ASA 3     general and [place]     (PROPOFOL)  intravenous induction     Anesthetic plan, risks, benefits, and alternatives have been provided, discussed and informed consent has been obtained with: patient.    Plan discussed with CRNA.    CODE STATUS:

## 2024-06-05 NOTE — OP NOTE
ABDOMINAL AORTIC ANEURYSM REPAIR  Procedure Report    Patient Name:  Dalila Velez  YOB: 1950    Date of Surgery:  6/5/2024     Indications: This is a 74-year-old female who has undergone a juxtarenal endovascular abdominal aortic aneurysm repair earlier today.  Postoperatively she had slowly drifted her blood pressure and was hypotensive.  She was also complaining of back pain.  CT angiogram was performed immediately which demonstrated active extravasation from the left kidney.  I had contacted the patient's daughter at that time and taken emergently to the operating room for coil embolization.  This is most likely a wire perforation in the process of repairing her aortic aneurysm.    Pre-op Diagnosis:   Hemorrhagic shock from left renal artery hemorrhage       Post-Op Diagnosis Codes:  Left renal artery hemorrhage    Procedure/CPT® Codes:      Procedure(s):  1.  Left renal distal inferior artery embolization for hemorrhage  2.  Graftmaster covered stent placement for distal renal artery thrombus versus dissection    Staff:  Surgeon(s):  Theodore Reyes MD    Circulator: Jaime Corey RN  Radiology Technologist: Lico Rosario; Renetta Rios RT  Scrub Person: Priti Park             Anesthesia: Monitored Anesthesia Care    Estimated Blood Loss:  Massive blood transfusion protocol.  Estimated blood loss is retroperitoneal    Implants:    Implant Name Type Inv. Item Serial No.  Lot No. LRB No. Used Action   STNT GRAFTMASTER RX 6F 4X19MM - [id number] Stent STNT GRAFTMASTER RX 6F 4X19MM  [employer] [id number] Left 1 Implanted   STENTGR ENDOPROSTH VIABAHN VBX BALN/EXP HEP 6F 5X19MM 135CM - [id number] - [id number] Implant STENTGR ENDOPROSTH VIABAHN VBX BALN/EXP HEP 6F 5X19MM 135CM [id number] [employer]  Left 1 Implanted   COIL CONCERTO NYL HELIX DETACH SYS 2MM 6CM - [id number] Implant COIL CONCERTO NYL HELIX DETACH SYS 2MM 6CM  EV3  A [employer] [id number] Left 1 Implanted    COIL CONCERTO NYL HELIX DETACH SYS 2MM 6CM - [id number] Implant COIL CONCERTO NYL HELIX DETACH SYS 2MM 6CM  EV3  A [employer] [id number] Left 1 Implanted   COIL CONCERTO NYL HELIX DETACH SYS 2MM 6CM - [id number] Implant COIL CONCERTO NYL HELIX DETACH SYS 2MM 6CM  EV3  A [employer] [id number] Left 1 Implanted   COIL CONCERTO NYL HELIX DETACH SYS 3MM 8CM - [id number] Implant COIL CONCERTO NYL HELIX DETACH SYS 3MM 8CM  EV3  A [employer] [id number] Left 1 Implanted       Specimen:          None        Findings:    1.  Left renal artery was extremely challenging to access through the aortic endograft through the stented ostium.  There was either stenosis or thrombus or dissection of the distal main renal artery prior to the trifurcation point.  Although the identified bleeding was through the inferior renal artery this was extremely difficult to access.  2.  4 mm Graftmaster covered coronary stent was placed within the distal renal artery to correct the underlying thrombus or dissection to allow for access of the inferior renal artery for coil embolization  3.  5 x 19 mm [place] VBX covered stent was deployed across the origin of the left renal artery.  This was intended to bridge the previous stent onto the Graftmaster however it would not advance all the way.  4.  Ultimate successful coil embolization of the identified area of hemorrhage from the inferior left renal artery.  Thrombin was also directly injected through a microcatheter    Complications: Left renal artery hemorrhage from recent juxtarenal abdominal aortic aneurysm repair    Description of Procedure: Patient was taken back to the operating room emergently.  She was placed in the supine position.  I contacted the patient's daughter with explanation of this emergent operation prior to entering.  Timeout was taken with identification of the patient and procedure.  Right femoral artery was accessed initially with a 7 [place] sheath placed.  A conformable 6-1/2  [place] sheath was advanced.  Due to the aortic anatomy of the bifurcated device it was very challenging to access the left renal artery from the right side.  The conformable catheter was not ideal.  A LIMA guide sheath was next used.  The left renal artery was ultimately selected.  A 1 4 wire would not traverse immediately past the distal left renal artery stent.  Angiography demonstrated very sluggish flow the distal left renal artery.  This could represent either a dissection or thrombus.  This was also seen on CT angiography.  Although the inferior left renal artery was identified as a source of hemorrhage with active extravasation despite multiple attempts initially could not access it.  I was able to advance a 4 mm balloon to angioplasty this distal left renal artery.  I also used this as balloon occlusion to try to cannulate the inferior pole at the same time.  Multiple attempts were made with different wires.  Ultimately I was unsuccessful in accessing the inferior pole during this approach.  The balloon occlusion was left in place to maintain some hemostasis.  I decided to proceed with a Graftmaster covered stent within the distal main renal artery.  This was in hopes of trying to cage off the ostium of the inferior renal artery and prevent the ongoing extravasation.  4 mm Graftmaster was advanced however due to the severe tortuosity of the catheters and the left renal artery it would not advance beyond the ostium of the left renal artery.  I could not deliver it distally.    The left femoral artery was next accessed and a 7 Mac[place] sheath was placed.  A conformable sheath was placed through this approach.  This allowed for better angulation through the bifurcated aortic endograft to access the left renal artery.  This was again extremely challenging but ultimately successful.  I was able to deliver the Graftmaster through this conformable sheath in the left femoral access.  This was deployed at the very  distal renal artery at the level of the trifurcation.  Unfortunately this did not stop the hemorrhage in the inferior renal artery still had active extravasation.  I then attempted to deliver a 5 x 19 [place] VBX stent to bridge the left renal artery pre-existing stent from the endograft into the Graftmaster if this was the source of hemorrhage.  The [place] stent would not deliver past the pre-existing stent in the endograft.  I had to deployed in this location as it was not coming out either.  4 mm balloon occlusion again was performed of the left renal artery and attempts to get new wires and new microcatheters.  Ultimately the inferior renal artery was successfully selected.  The microcatheter was able to be advanced in the very distal inferior renal artery and the extravasation source was identified.  2 mm coil was initially deployed followed with an additional 2 x 4 mm detachable coil.  Thrombin was directly administered in this location in hopes of causing thrombosis of the retroperitoneal blood as well.  2 additional 3 mm detachable coils were deployed within the inferior pole.  This was confirmed to have hemostasis.  There was flow seen within the left kidney through directing injection of the ostium.  No further hemorrhage was identified.  Patient at this point had removal of both femoral sheaths and Angio-Seal closure device.  She was taken to the intensive care unit in critical condition.      Theodore Reyes MD     Date: 6/5/2024  Time: 16:23 EDT

## 2024-06-05 NOTE — OP NOTE
ABDOMINAL AORTIC ANEURYSM REPAIR  Procedure Report    Patient Name:  Dalila Velez  YOB: 1950    Date of Surgery:  6/5/2024     Indications: This is a pleasant 74-year-old female with a juxtarenal abdominal aortic aneurysm.  This extends above the bilateral renal arteries to the superior mesenteric artery.  She has history of chronic renal disease in both renal arteries are already stented as well.  This is a very complex aortic anatomy and she has been offered laser fenestration of an aortic graft for compassionate use for repair if she is not a good candidate for open repair.      Pre-op Diagnosis:   Juxtarenal abdominal aortic aneurysm         Post-Op Diagnosis Codes:  Occluded right renal artery and juxtarenal aortic aneurysm    Procedure/CPT® Codes:      Procedure(s):  1.ENDOVASCULAR REPAIR OF AORTIC ANEURYSM with fenestration of the left renal artery    2.  Large-bore bilateral femoral sheath placement for endovascular graft deployment.  Right 22 Maltese and left 12 Maltese    3.  Bifurcated endovascular repair of aortic aneurysm extending into the right external iliac artery and left common iliac artery      Staff:  Surgeon(s):  Theodore Reyes MD    Circulator: Jaime Corey RN  Radiology Technologist: Lico Rosario  Scrub Person: Angela Bautista  Nursing Assistant: Fouzia Ferrera             Anesthesia: Monitored Anesthesia Care    Estimated Blood Loss: 100 mL    Implants:    Implant Name Type Inv. Item Serial No.  Lot No. LRB No. Used Action   STENTGR CONFRM THOR GORETAG 22F [id number] 10CM - [id number] - [id number] Implant STENTGR CONFRM THOR GORETAG 22F [id number] 10CM [id number] [employer]ILL[employer] AND   N/A 1 Implanted   STNT BILI EXPRSS SD [id number] 150CM - [id number] Stent STNT BILI EXPRSS SD [id number] 150CM  [employer] [id number] Left 1 Implanted   STENTGR ENDOPROSTH VIABAHN VBX BALN/EXP HEP 6F 5X19MM 135CM - [id number] - [id number] Implant STENTGR ENDOPROSTH VIABAHN VBX BALN/EXP  HEP 6F 5X19MM 135CM [id number] [employer]  Left 1 Implanted   GRFT EXCLDR C3 TRNK I/LAT 35X14.1ZF75RF - [id number] - [id number] Implant GRFT EXCLDR C3 TRNK I/LAT 35X14.1NW56WI [id number] [employer]  Left 1 Implanted   GRFT EXCLDR CONTRALAT [id number] - [id number] - [id number] Implant GRFT EXCLDR CONTRALAT [id number] [employer]   1 Implanted   GRFT EXCLDR CONTRALAT W/ACT/CONTRL/SYS 12MM 14CM - [id number] - [id number] Implant GRFT EXCLDR CONTRALAT W/ACT/CONTRL/SYS 12MM 14CM [id number] [employer]  N/A 1 Implanted   HEMOST ABS SURGICEL SNOW 1X2IN - [id number] Implant HEMOST ABS SURGICEL SNOW 1X2IN  [employer]  DIV [employer] J AND J [id number] N/A 1 Implanted       Specimen:          None        Findings:    1.  Circumferential calcification involving the bilateral femoral and external iliac arteries making sheath placement challenging.  Right 22 [place] sheath and left 12 British sheath were placed  2.  Juxtarenal abdominal aortic aneurysm was repaired with a [place] thoracic aortic endograft placed at the level of the superior mesenteric artery with coverage of bilateral renal arteries.  The right renal artery was occluded and despite attempts at catheterization there was no flow seen prior to aneurysm repair.  3.  Fenestration of the left renal artery through thoracic aortic graft and placement of a 5 [place]abahn VBX  4.  Right internal iliac artery was intentionally covered due to a very short seal zone requiring extension into the external iliac.  5.  [place] prosthesis 34 x 10 cTag, [place] excluder 35 x 14x14, right iliac extension into the external iliac of 12 x 14 and left iliac extension into the common iliac of 12 x 10      Complications: None      Description of Procedure:   Patient was taken back to the operating placed in supine position on operating table.  Following IV sedation bilateral groins and abdomen were widely prepped and draped in sinistral fashion.  A timeout was taken with identification of  the patient and procedure.  Ultrasound-guided access of bilateral femoral arteries were obtained.  She had heavily diseased and rather small common femoral arteries bilaterally.  6 [place] sheaths were placed and heparin was initially administered.  3 Perclose devices were attempted on the left side but only 1 was successful and entry.  The left femoral was a larger diameter vessel.  The right femoral which was more diseased and smaller had successful placement of 2 Perclose devices.  For this reason the 22 [place]ian was chosen to be placed in the right side.  There was difficulties advancing his 22 [place] sheath due to a rather small external iliac artery and heavy calcification present throughout.  I was initially only able to advance it within the mid external iliac artery.  Through the left side a Omni Flush catheter was placed and abdominal aortography was performed.  No flow was seen within the right renal artery.  Left renal was also stented but there was flow seen in it.    Attempts to advance the [place] thoracic device to the right 22 [place] sheath was initially unsuccessful.  This required additional pressure but the 22 [place] sheath was able to be advanced into the abdominal aorta safely.  The [place] thoracic device was advanced and following location of the superior mesenteric artery partial deployment up to 50% was performed.  Through the partially deployed thoracic device a conformable sheath was entered through the left femoral artery.  This was used to create a laser fenestration through the thoracic device at the level of the left renal artery.  An 018 wire was advanced.  Attempts to create a larger opening was very challenging.  I was ultimately successful with 2 mm balloon angioplasty of the lesion fenestration upsized to a 4 mm and placement of a 5 mm 018 renal stent.  This allowed for delivery of a [place] VBX 5 x 19 covered stent which was then postdilated with 6 mm high-pressure balloon.  The remainder  of the thoracic device was then deployed.    In order to sealed the distal aorta a 35 mm bifurcated excluder graft was chosen.  This was deployed below the left renal stent.  Contralateral gate was successfully cannulated.  The left internal iliac artery was identified.  A 12 x 10 limb was deployed in the left common iliac artery.  There was not enough seal zone in the right common iliac artery for the initial bifurcated device.  Decision was made to extend this down into the external iliac and also cover the external iliac in case of iatrogenic injury.  This was performed with a 12 x 14 limb extender.  An aortic molding balloon was used to angioplasty the proximal distal fixation points.  Completion angiography demonstrated a proximal seal and patency of the left renal artery.  Also the superior mesenteric artery had normal antegrade flow.  Sheath shots were then used to confirm flow within the external iliac and left internal ankle artery.  Sheaths were removed and Perclose was successful in achieving hemostasis.  Patient was then taken back to recovery in stable condition following reversal of heparin with protamine      Theodore Reyes MD     Date: 6/5/2024  Time: 09:55 EDT

## 2024-06-05 NOTE — ANESTHESIA POSTPROCEDURE EVALUATION
Patient: Dalila Velez    Procedure Summary       Date: 06/05/24 Room / Location: [facility] OR 02 / [facility] HYBRID OR    Anesthesia Start: 1355 Anesthesia Stop: 1652    Procedure: 1.  Left renal distal inferior artery embolization for hemorrhage 2.  Graftmaster covered stent placement for distal renal artery thrombus versus dissection (Abdomen) Diagnosis:     Surgeons: Theodore Reyes MD Provider: Huy Sargent MD    Anesthesia Type: general ASA Status: 4 - Emergent            Anesthesia Type: No value filed.    Vitals  Vitals Value Taken Time   /66 06/05/24 1647   Temp     Pulse 80 06/05/24 1650   Resp     SpO2 100 % 06/05/24 1650   Vitals shown include unfiled device data.        Post Anesthesia Care and Evaluation    Patient location during evaluation: PACU  Patient participation: complete - patient participated  Level of consciousness: obtunded/minimal responses  Pain score: 0  Pain management: adequate    Airway patency: patent  Anesthetic complications: No anesthetic complications  PONV Status: none  Cardiovascular status: acceptable and stable  Respiratory status: acceptable, ETT, intubated and ventilator  Hydration status: acceptable

## 2024-06-05 NOTE — H&P
New Onbase, [place]  Physician     H&P      Signed     Creation Time: 05/22/24 1559   Related encounter: Pre-Admission Testing from 5/29/2024 in [facility] PREADMISSION T     Associated Documents  Scan on 5/22/2024 1543 by New Onbase, [place]: H&P [facility], 05/16/2024         Last signed by: New Onbase, [place] at 05/22/24 1559       [facility] Pre-op    Full history and physical note from office is attached above.  + Cardiac clearance in chart from 1/24/2024.    BP:149/64  HR: 49  RR: 18  SPO2: 95% on RA  Temp: 98.2    Immunizations:  Influenza:  Yes  Pneumococcal:  Yes  Tetanus:  No  Covid : x3    Review of Systems:  Constitutional-- No fever, chills or sweats. No fatigue.  CV-- No chest pain, palpitation or syncope  Resp-- No SOB, cough, hemoptysis  Skin--No rashes or lesions    LAB Results:  Lab Results   Component Value Date    WBC 6.80 05/29/2024    HGB 12.9 05/29/2024    HCT 41.3 05/29/2024    MCV 88.4 05/29/2024     05/29/2024    NEUTROABS 4.93 05/29/2024    GLUCOSE 112 (H) 05/29/2024    BUN 21 05/29/2024    CREATININE 1.40 (H) 05/29/2024    EGFRIFNONA 38 (L) 11/02/2017     05/29/2024    K 4.2 05/29/2024     (H) 05/29/2024    CO2 23.0 05/29/2024    MG 2.1 11/02/2017    CALCIUM 8.9 05/29/2024    ALBUMIN 5.00 (H) 11/02/2017    AST 26 11/02/2017    ALT 20 11/02/2017    BILITOT 0.6 11/02/2017       Cancer Staging (if applicable)  Cancer Patient: __ yes _x_no __unknown__N/A; If yes, clinical stage T:__ N:__M:__, stage group or __N/A      Impression: Abdominal aortic aneurysm      Plan: ENDOVASCULAR REPAIR OF AORTIC ANEURYSM       Junito Monet, APRN   6/5/2024   06:57 EDT

## 2024-06-06 PROBLEM — N17.9 ACUTE KIDNEY INJURY: Status: ACTIVE | Noted: 2024-06-06

## 2024-06-06 NOTE — PROGRESS NOTES
"   LOS: 1 day   Patient Care Team:  Frannie Couch MD as PCP - General      Subjective       Subjective  POD#1 s/p EVAR, followed by emergent left renal artery embolization with stent placement for hemorrhage (6/5/24, Eric). Patient remains intubated and sedated. Hgb stable today. Per nursing at bedside, abdomen has remained tight but abdominal pressure was 6 this morning. Patient has passed multiple Bms overnight     History taken from: chart RN    Objective     Vital Signs  Temp:  [87.8 °F (31 °C)-100.2 °F (37.9 °C)] 100 °F (37.8 °C)  Heart Rate:  [55-98] 68  Resp:  [15-18] 17  BP: ()/(25-99) 117/54  FiO2 (%):  [35 %-100 %] 35 %    Objective:  Vital signs: (most recent): Blood pressure 117/54, pulse 68, temperature 100 °F (37.8 °C), temperature source Bladder, resp. rate 17, height 168.9 cm (66.5\"), weight 65.4 kg (144 lb 1.1 oz), SpO2 (!) 87%.            Physical Exam:  Present at bedside: nursing   General: no acute distress, intubated and sedated   Respiratory: ETT tube secured, oral gastric tube in place  Abdomen: tight and distended  Back: mottled appearance to lower back  Bilateral Groins: access sites c/d/I, no surrounding ecchymosis, no evidence of hematoma  Extremities: warm and pink without edema  Vascular: palpable LEFT DP pulse, audible RIGHT DP signal      Results Review:     I reviewed the patient's new clinical results.  CBC    Results from last 7 days   Lab Units 06/06/24  0514 06/06/24  0034 06/05/24  1643 06/05/24  1538 06/05/24  1522 06/05/24  1447 06/05/24  1236   WBC 10*3/mm3 8.98 9.78 10.53  --   --   --   --    HEMOGLOBIN g/dL 10.0* 10.1* 10.0*  --   --   --  6.6*   HEMOGLOBIN, POC g/dL  --   --   --  6.8* 5.8* 7.8*  --    PLATELETS 10*3/mm3 146 146 125*  --   --   --   --      BMP   Results from last 7 days   Lab Units 06/06/24  0514 06/06/24  0034 06/05/24  1933 06/05/24  1643   SODIUM mmol/L 148* 148* 151* 150*   POTASSIUM mmol/L 4.1 3.6 3.0* 3.0*   CHLORIDE mmol/L 114* 114* " 111* 111*   CO2 mmol/L 25.0 23.0 18.0* 19.0*   BUN mg/dL 31* 30* 27* 27*   CREATININE mg/dL 2.41* 2.20* 1.83* 1.91*   GLUCOSE mg/dL 73 176* 256* 259*   MAGNESIUM mg/dL  --  1.6  --   --      CMP   Results from last 7 days   Lab Units 06/06/24  0514 06/06/24  0034 06/05/24  1933 06/05/24  1643   SODIUM mmol/L 148* 148* 151* 150*   POTASSIUM mmol/L 4.1 3.6 3.0* 3.0*   CHLORIDE mmol/L 114* 114* 111* 111*   CO2 mmol/L 25.0 23.0 18.0* 19.0*   BUN mg/dL 31* 30* 27* 27*   CREATININE mg/dL 2.41* 2.20* 1.83* 1.91*   GLUCOSE mg/dL 73 176* 256* 259*     ABG    Results from last 7 days   Lab Units 06/06/24  0330 06/05/24  2152 06/05/24  1738 06/05/24  1538 06/05/24  1522 06/05/24  1447   PH, ARTERIAL pH units 7.466* 7.404 7.323* 7.31* 7.21* 7.02*   PCO2, ARTERIAL mm Hg 35.8 32.1* 35.4  --   --   --    PO2 ART mm Hg 72.5* 68.9* 298.0*  --   --   --    BASE EXCESS ART mmol/L 2.1* -3.9* -7.0*  --   --   --      UA           Current Facility-Administered Medications:     aspirin tablet 325 mg, 325 mg, Nasogastric, Daily, Saúl Cao MD    [START ON 6/7/2024] bisoprolol (ZEBeta) tablet 2.5 mg, 2.5 mg, Nasogastric, Daily, Saúl Cao MD    Calcium Replacement - Follow Nurse / BPA Driven Protocol, , Does not apply, PRN, Michell Ugarte MD    ceFAZolin 2000 mg IVPB in 100 mL NS (MBP), 2,000 mg, Intravenous, Q8H, Theodore Reyes MD    chlorhexidine (PERIDEX) 0.12 % solution 15 mL, 15 mL, Mouth/Throat, Q12H, Michell Ugarte MD, 15 mL at 06/06/24 0826    dextrose (D50W) (25 g/50 mL) IV injection 10-50 mL, 10-50 mL, Intravenous, Q15 Min PRN, Karan Munroe APRN    dextrose 5 % and sodium chloride 0.45 % infusion, 75 mL/hr, Intravenous, Continuous, Saúl Cao MD, Last Rate: 100 mL/hr at 06/06/24 0653, 100 mL/hr at 06/06/24 0653    diazePAM (VALIUM) tablet 5 mg, 5 mg, Nasogastric, Daily PRN, Saúl Cao MD    Enoxaparin Sodium (LOVENOX) syringe 30 mg, 30 mg, Subcutaneous, Daily, Eric,  MD Theodore    famotidine (PEPCID) injection 20 mg, 20 mg, Intravenous, Daily, Michell Ugarte MD, 20 mg at 06/06/24 0826    furosemide (LASIX) injection 20 mg, 20 mg, Intravenous, Once, Theodore Reyes MD    glucagon (GLUCAGEN) injection 1 mg, 1 mg, Intramuscular, Q15 Min PRN, Karan Munroe, MORIAH    HYDROcodone-acetaminophen (NORCO) 5-325 MG per tablet 1 tablet, 1 tablet, Nasogastric, Q4H PRN, Saúl Cao MD    insulin regular (humuLIN R,novoLIN R) injection 2-9 Units, 2-9 Units, Subcutaneous, Q6H, Karan Munroe, MORIAH    [START ON 6/7/2024] levothyroxine (SYNTHROID, LEVOTHROID) tablet 100 mcg, 100 mcg, Nasogastric, Q AM, Saúl Cao MD    Magnesium Low Dose Replacement - Follow Nurse / BPA Driven Protocol, , Does not apply, PRN, Michell Ugarte MD    morphine injection 1 mg, 1 mg, Intravenous, Q4H PRN **AND** naloxone (NARCAN) injection 0.4 mg, 0.4 mg, Intravenous, Q5 Min PRN, Saúl Cao MD    niCARdipine (CARDENE) 25mg in 250mL NS infusion, 5-15 mg/hr, Intravenous, Titrated, Theodore Reyes MD    NIFEdipine XL (PROCARDIA XL) 24 hr tablet 90 mg, 90 mg, Oral, Daily, Theodore Reyes MD    nitroglycerin (NITROSTAT) SL tablet 0.4 mg, 0.4 mg, Sublingual, Q5 Min PRN, Theodore Reyes MD    ondansetron (ZOFRAN) injection 4 mg, 4 mg, Intravenous, Q6H PRN, Michell Ugarte MD    Phosphorus Replacement - Follow Nurse / BPA Driven Protocol, , Does not apply, PRN, Michell Ugarte MD    polyethylene glycol (MIRALAX) packet 17 g, 17 g, Oral, Daily, Theodore Reyes MD    polyvinyl alcohol (LIQUIFILM) 1.4 % ophthalmic solution 2 drop, 2 drop, Both Eyes, Q1H PRN, Michell Ugarte MD    Potassium Replacement - Follow Nurse / BPA Driven Protocol, , Does not apply, PRN, Michell Ugarte MD    propofol (DIPRIVAN) infusion 10 mg/mL 100 mL, 5-50 mcg/kg/min, Intravenous, Titrated, Saúl Cao MD, Last Rate: 19.62 mL/hr at 06/06/24 0956, 50 mcg/kg/min at 06/06/24 0956     [START ON 6/7/2024] sertraline (ZOLOFT) tablet 200 mg, 200 mg, Nasogastric, Daily, Saúl Cao MD    sodium chloride 0.9 % flush 10 mL, 10 mL, Intravenous, Q12H, Michell Ugarte MD, 10 mL at 06/06/24 0851    sodium chloride 0.9 % flush 10 mL, 10 mL, Intravenous, PRN, Michell Ugarte MD    sodium chloride 0.9 % infusion 40 mL, 40 mL, Intravenous, PRN, Michell Ugarte MD    traZODone (DESYREL) tablet 100 mg, 100 mg, Nasogastric, Nightly, Saúl Cao MD     Assessment & Plan       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  Large-bore bilateral femoral sheath placement for endovascular graft deployment.  Right 22 Syrian and left 12 Syrian  3.  Bifurcated endovascular repair of aortic aneurysm extending into the right external iliac artery and left common iliac artery  - 6/5/24 (Eric):  1.  Left renal distal inferior artery embolization for hemorrhage  2.  Graftmaster covered stent placement for distal renal artery thrombus versus dissection  - Labs reviewed: Hgb 10.0, stable, received 7units pRBC 6/5, will continue to trend   - CXR reviewed: left basilar atelectasis and effusion appear mildly increased  - SBP <160  - on cardene gtt  - continue aspirin, statin, lovenox ppx  - continue IV fluids  - continue to monitor abdominal pressures, 6 this morning   - will begin gentle diuresis, Lasix 20mg ordered   - Pulmonology following, appreciate assistance     Patient's case was reviewed in detail with Dr. Reyes who directed the above plan of care. Plan reviewed with nursing at bedside who verbalized understanding and agreement.     Ani Bergeron PA-C  06/06/24  10:14 EDT

## 2024-06-06 NOTE — PLAN OF CARE
Goal Outcome Evaluation:  Plan of Care Reviewed With: patient                  Arrived from OR. PRBC's transfusing. Two units of cryo given after PRBC's , Labs sent post transfusions (~1900). SBP ~160. Temp ~31 via bladder. Lower ABD and flanks firm. Pt. Sedated, minimally responsive. Vent settings per RT. Sats 95%+. NSR. -160. Cardene started to maintain SBP less than 160 per Eric. +PP. UOP ~150 since arrival. No BM, tinkling BS. Temp improving with COCOON. Currently temp is 32.6 via bladder.  IAP on arrival was 18, most recent pressure was 10, MD aware. Incisions CDI. No overt s/s of pain,

## 2024-06-06 NOTE — PROCEDURES
"Insert Central Line At Bedside    Date/Time: 6/5/2024 9:55 PM    Performed by: Karan Munroe APRN  Authorized by: Karan Munroe APRN  Consent: Verbal consent obtained. Written consent obtained.  Risks and benefits: risks, benefits and alternatives were discussed  Consent given by: spouse  Patient understanding: patient states understanding of the procedure being performed  Patient consent: the patient's understanding of the procedure matches consent given  Procedure consent: procedure consent matches procedure scheduled  Relevant documents: relevant documents present and verified  Test results: test results available and properly labeled  Site marked: the operative site was marked  Required items: required blood products, implants, devices, and special equipment available  Patient identity confirmed: verbally with patient, arm band and hospital-assigned identification number  Time out: Immediately prior to procedure a \"time out\" was called to verify the correct patient, procedure, equipment, support staff and site/side marked as required.  Indications: vascular access  Anesthesia: local infiltration    Anesthesia:  Local Anesthetic: lidocaine 2% without epinephrine  Anesthetic total: 5 mL    Sedation:  Patient sedated: no    Preparation: skin prepped with 2% chlorhexidine  Skin prep agent dried: skin prep agent completely dried prior to procedure  Sterile barriers: all five maximum sterile barriers used - cap, mask, sterile gown, sterile gloves, and large sterile sheet  Hand hygiene: hand hygiene performed prior to central venous catheter insertion  Location details: right internal jugular  Patient position: flat  Catheter type: triple lumen  Pre-procedure: landmarks identified  Ultrasound guidance: yes  Sterile ultrasound techniques: sterile gel and sterile probe covers were used  Number of attempts: 1  Successful placement: yes  Post-procedure: line sutured and dressing applied  Assessment: blood " return through all ports, free fluid flow and placement verified by x-ray  Patient tolerance: Patient tolerated the procedure well with no immediate complications

## 2024-06-06 NOTE — PLAN OF CARE
Goal Outcome Evaluation:  Plan of Care Reviewed With: patient        Progress: no change          VSS. H/H stable. Remains sedated and intubated. Marked increase is SBP when weaning sedation. SBP ~130-150 while sedated. Vent settings per RT. SpO2 ~88-93%. APRN aware vent settings adjusted. Lungs sounds diminished. L-lung wheezy.  +PP. Abd less firm this evening. Left UQ and flank  remain rigid.  Max IAP was 13 today. +BM. UOP- 260 ml, with lasix. Incisions remain CDI. Minimal overt s/s of pain, primarily with repositioning and inline suctioning.

## 2024-06-06 NOTE — PROGRESS NOTES
Intensive Care Follow-up     Hospital:  LOS: 1 day   Ms. Dalila Velez, 74 y.o. 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 Initial surgery was completed without complication however the patient began to become hemodynamically unstable and was found to have a hemoglobin of 6.6 down from 12.9 g preoperatively. She was taken emergently back to the operating room and a leak from the renal artery was repaired. She has received a total of 7 units of packed red blood cells as well as associated plasma and cryo. She is brought to the intensive care unit in an intubated state for recovery.   Interval History:  The chart has been reviewed.  The patient has intermittently required Cardene.  She is ventilating well.  Abdominal pressure when last measured was 6.  She has been oliguric.  Creatinine clearance has worsened.  Hemoglobin however has remained stable.  She had little response to 20 mg IV Lasix.    The patient's past medical, surgical and social history were reviewed and updated in Epic as appropriate.        Objective     Infusions:  dextrose 5 % and sodium chloride 0.45 %, 75 mL/hr, Last Rate: 75 mL/hr (06/06/24 1050)  niCARdipine, 5-15 mg/hr, Last Rate: Stopped (06/06/24 1202)  propofol, 5-50 mcg/kg/min, Last Rate: 50 mcg/kg/min (06/06/24 0956)      Medications:  amLODIPine, 10 mg, Nasogastric, Q24H  aspirin, 325 mg, Nasogastric, Daily  bisoprolol, 2.5 mg, Nasogastric, Daily  ceFAZolin, 2,000 mg, Intravenous, Q8H  chlorhexidine, 15 mL, Mouth/Throat, Q12H  enoxaparin, 30 mg, Subcutaneous, Daily  famotidine, 20 mg,  "Intravenous, Daily  insulin regular, 2-9 Units, Subcutaneous, Q6H  [START ON 6/7/2024] levothyroxine, 100 mcg, Nasogastric, Q AM  polyethylene glycol, 17 g, Nasogastric, Daily  sertraline, 200 mg, Nasogastric, Daily  sodium chloride, 10 mL, Intravenous, Q12H  traZODone, 100 mg, Nasogastric, Nightly        Vital Sign Min/Max for last 24 hours  Temp  Min: 87.8 °F (31 °C)  Max: 100.2 °F (37.9 °C)   BP  Min: 108/52  Max: 157/67   Pulse  Min: 55  Max: 82   Resp  Min: 15  Max: 18   SpO2  Min: 87 %  Max: 100 %   Flow (L/min)  Min: 6  Max: 6       Input/Output for last 24 hour shift  06/05 0701 - 06/06 0700  In: 8209.6 [I.V.:4652.6]  Out: 520 [Urine:370]   Mode: VC+/AC  FiO2 (%):  [35 %-100 %] 35 %  S RR:  [9-14] 14  S VT:  [440 mL] 440 mL  PEEP/CPAP (cm H2O):  [5 cm H20] 5 cm H20  MAP (cm H2O):  [9.3-13] 10  Objective:  General Appearance:  Uncomfortable and ill-appearing (Sedated on the ventilator.).    Vital signs: (most recent): Blood pressure 139/46, pulse 68, temperature 98.7 °F (37.1 °C), temperature source Axillary, resp. rate 15, height 168.9 cm (66.5\"), weight 65.4 kg (144 lb 1.1 oz), SpO2 91%.    HEENT: (Endotracheal tube in place.)    Lungs:  Normal effort and normal respiratory rate.  Breath sounds clear to auscultation.    Heart: Normal rate.  Regular rhythm.  S1 normal and S2 normal.    Abdomen: (Abdomen distended and tight.  No pulsatile masses felt.  Scant if any bowel sounds heard.).  Hypoactive bowel sounds.     Extremities: There is dependent edema.    Neurological: (Currently sedated).    Pupils:  Pupils are equal, round, and reactive to light.  Pupils are equal. (Scleral edema).    Skin:  Warm and pale.                Results from last 7 days   Lab Units 06/06/24  1240 06/06/24  0514 06/06/24  0034   WBC 10*3/mm3 10.98* 8.98 9.78   HEMOGLOBIN g/dL 9.2* 10.0* 10.1*   PLATELETS 10*3/mm3 118* 146 146     Results from last 7 days   Lab Units 06/06/24  1240 06/06/24  0514 06/06/24  0034   SODIUM mmol/L 146* " 148* 148*   POTASSIUM mmol/L 4.7 4.1 3.6   CO2 mmol/L 23.0 25.0 23.0   BUN mg/dL 34* 31* 30*   CREATININE mg/dL 2.67* 2.41* 2.20*   MAGNESIUM mg/dL  --   --  1.6   GLUCOSE mg/dL 143* 73 176*     Estimated Creatinine Clearance: 19.4 mL/min (A) (by C-G formula based on SCr of 2.67 mg/dL (H)).    Results from last 7 days   Lab Units 06/06/24  0330   PH, ARTERIAL pH units 7.466*   PCO2, ARTERIAL mm Hg 35.8   PO2 ART mm Hg 72.5*         I reviewed the patient's results and images.     Assessment & Plan   Impression        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    Renal artery stenosis    Hypertension    Hypothyroidism    Acute blood loss anemia    Acute kidney injury       Plan        I will leave go ahead and give 1 further dose of 40 mg IV Lasix now and then we will monitor.  Repeat labs tomorrow morning.  Blood pressure control as before.  Maintain current ventilator support as I do not believe that she is stable enough for attempts at extubation today.  If her renal function were to continue to worsen, I will involve the nephrology service.  No current evidence of abdominal compartment syndrome.  We will go ahead and continue to check abdominal pressures every 6-8 hours and as needed.  The patient does remain at very high risk of worsening.      Plan of care and goals reviewed with mulitdisciplinary/antibiotic stewardship team during rounds.   I discussed the patient's findings and my recommendations with nursing staff and primary care team     High level of risk due to:  drug(s) requiring intensive monitoring for toxicity and parenteral controlled substances.        Saúl Cao MD, [facility]  Pulmonology and Critical Care Medicine

## 2024-06-06 NOTE — CASE MANAGEMENT/SOCIAL WORK
Discharge Planning Assessment  [facility]     Patient Name: aDlila Velez  MRN: [id number]  Today's Date: 6/6/2024    Admit Date: 6/5/2024    Plan: TBD   Discharge Needs Assessment       Row Name 06/06/24 0819       Living Environment    People in Home alone    Current Living Arrangements home    Potentially Unsafe Housing Conditions none    Primary Care Provided by self    Provides Primary Care For no one    Family Caregiver if Needed child(marianne), adult    Family Caregiver Names Irma Borrego (daughter) [phone]    Able to Return to Prior Arrangements yes       Resource/Environmental Concerns    Resource/Environmental Concerns none    Transportation Concerns none       Transition Planning    Patient/Family Anticipates Transition to home    Patient/Family Anticipated Services at Transition none    Transportation Anticipated family or friend will provide       Discharge Needs Assessment    Readmission Within the Last 30 Days no previous admission in last 30 days    Equipment Currently Used at Home cane, quad    Concerns to be Addressed denies needs/concerns at this time    Anticipated Changes Related to Illness none    Equipment Needed After Discharge none                   Discharge Plan       Row Name 06/06/24 0820       Plan    Plan TBD    Patient/Family in Agreement with Plan yes    Plan Comments Spoke with daughter by phone. Lives alone in [facility]. Contact is Irma Borrego (daughter) [phone]. Is independent with ADL's. No problems with [employer] or medications. Uses a quad cane. No advanced directives. PCP is Frannie Couch MD. Plan is To be determined. Patient is on a vent. CM will continue to follow.    Final Discharge Disposition Code 30 - still a patient                  Continued Care and Services - Admitted Since 6/5/2024    No active coordination exists for this encounter.          Demographic Summary       Row Name 06/06/24 0818       General Information    Admission Type inpatient     Arrived From PACU/recovery room    Referral Source admission list    Reason for Consult discharge planning    Preferred Language English       Contact Information    Permission Granted to Share Info With     Contact Information Obtained for                    Functional Status       Row Name 06/06/24 0818       Functional Status    Usual Activity Tolerance good    Current Activity Tolerance good       Functional Status, IADL    Medications independent    Meal Preparation independent    Housekeeping independent    Laundry independent    Shopping independent       Mental Status    General Appearance WDL WDL       Mental Status Summary    Recent Changes in Mental Status/Cognitive Functioning no changes       Employment/    Employment Status retired                   Psychosocial    No documentation.                  Abuse/Neglect    No documentation.                  Legal    No documentation.                  Substance Abuse    No documentation.                  Patient Forms    No documentation.                     Vivek Simeon RN

## 2024-06-06 NOTE — PLAN OF CARE
Goal Outcome Evaluation:      Patient stable through night. TLDL placed, potassium replaced, insulin gtt started. Insulin gtt stopped around 0600. Propofol continued for sedation. UOP <300ml for shift. NG repositioned. IAP monitoring ranged from 9-15. Lactic trending down. Patient remains in deep sedative state. Cardene to maintain BP <160 systolic. BMx2. H/H stable. Remains intubated with fio2 40%. Family updated via telephone.

## 2024-06-07 NOTE — CASE MANAGEMENT/SOCIAL WORK
Continued Stay Note  [facility]     Patient Name: Dalila Velez  MRN: [id number]  Today's Date: 6/7/2024    Admit Date: 6/5/2024    Plan: ongoing   Discharge Plan       Row Name 06/07/24 1237       Plan    Plan ongoing    Patient/Family in Agreement with Plan other (see comments)    Plan Comments Discussed in MDR, to ICU S/P AAA repair on 6/5. Patient is currently intubated and sedated. Nephrology consult today due to worsening renal function. D/C plan TBD @ this time. CM will continue to follow.    Final Discharge Disposition Code 30 - still a patient                   Discharge Codes    No documentation.                 Expected Discharge Date and Time       Expected Discharge Date Expected Discharge Time    Jun 14, 2024               Jame Villarreal RN

## 2024-06-07 NOTE — PROGRESS NOTES
Intensive Care Follow-up     Hospital:  LOS: 2 days   Ms. Dalila Velez, 74 y.o. 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 Initial surgery was completed without complication however the patient began to become hemodynamically unstable and was found to have a hemoglobin of 6.6 down from 12.9 g preoperatively. She was taken emergently back to the operating room and a leak from the renal artery was repaired. She has received a total of 7 units of packed red blood cells as well as associated plasma and cryo. She is brought to the intensive care unit in an intubated state for recovery.   Interval History:  The chart has been reviewed.  The patient has remained afebrile.  She has had some increased secretions as well as some increased ventilator demand.  Urinary outflow has remained poor and a consult has been placed for nephrology overnight.  She has been having a lot of liquid bowel movements.  Hemodynamics have remained relatively stable.    The patient's past medical, surgical and social history were reviewed and updated in Epic as appropriate.        Objective     Infusions:  niCARdipine, 5-15 mg/hr  propofol, 5-50 mcg/kg/min, Last Rate: 45 mcg/kg/min (06/07/24 1114)      Medications:  amLODIPine, 10 mg, Nasogastric, Q24H  aspirin, 325 mg, Nasogastric, Daily  bisoprolol, 2.5 mg, Nasogastric, Daily  budesonide, 0.5 mg, Nebulization, BID - RT  chlorhexidine, 15 mL, Mouth/Throat, Q12H  enoxaparin, 30 mg, Subcutaneous, Daily  famotidine, 20 mg, Intravenous, Daily  insulin regular, 2-9 Units, Subcutaneous,  "Q6H  ipratropium-albuterol, 3 mL, Nebulization, Q6H - RT  levothyroxine, 100 mcg, Nasogastric, Q AM  polyethylene glycol, 17 g, Nasogastric, Daily  sertraline, 200 mg, Nasogastric, Daily  sodium chloride, 10 mL, Intravenous, Q12H  traZODone, 100 mg, Nasogastric, Nightly        Vital Sign Min/Max for last 24 hours  Temp  Min: 97.9 °F (36.6 °C)  Max: 99.7 °F (37.6 °C)   BP  Min: 100/37  Max: 142/55   Pulse  Min: 60  Max: 102   Resp  Min: 14  Max: 23   SpO2  Min: 88 %  Max: 98 %   No data recorded       Input/Output for last 24 hour shift  06/06 0701 - 06/07 0700  In: 2188.5 [I.V.:1548.5]  Out: 260 [Urine:260]   Mode: VC+/AC  FiO2 (%):  [50 %-100 %] 50 %  S RR:  [12-16] 16  S VT:  [410 mL-480 mL] 410 mL  PEEP/CPAP (cm H2O):  [5 cm H20-12 cm H20] 10 cm H20  MAP (cm H2O):  [10-20] 15  Objective:  General Appearance:  Ill-appearing, in no acute distress and comfortable (Sedated on the ventilator.).    Vital signs: (most recent): Blood pressure (!) 100/37, pulse 72, temperature 97.9 °F (36.6 °C), temperature source Bladder, resp. rate 16, height 168.9 cm (66.5\"), weight 80.9 kg (178 lb 5.6 oz), SpO2 93%.    HEENT: (Endotracheal tube in place.)    Lungs:  Normal effort and normal respiratory rate.  There are rales and decreased breath sounds.  No rhonchi.    Heart: Normal rate.  Regular rhythm.  S1 normal and S2 normal.    Abdomen: (Abdomen distended and tight.  ).  Hypoactive bowel sounds.     Extremities: There is dependent edema.  There is no deformity.    Neurological: (Currently sedated).    Pupils:  Pupils are equal, round, and reactive to light.  Pupils are equal. (Scleral edema).    Skin:  Warm and pale.                Results from last 7 days   Lab Units 06/07/24  1232 06/07/24  0502 06/07/24  0038   WBC 10*3/mm3 10.83* 10.76 8.31   HEMOGLOBIN g/dL 8.1* 7.9* 8.0*   PLATELETS 10*3/mm3 89* 82* 86*     Results from last 7 days   Lab Units 06/07/24  1232 06/07/24  0502 06/07/24  0038 06/06/24  0514 06/06/24  0034 "   SODIUM mmol/L 140 143 143   < > 148*   POTASSIUM mmol/L 4.6 4.4 4.0   < > 3.6   CO2 mmol/L 21.0* 21.0* 20.0*   < > 23.0   BUN mg/dL 42* 39* 36*   < > 30*   CREATININE mg/dL 3.48* 3.70* 3.32*   < > 2.20*   MAGNESIUM mg/dL  --   --   --   --  1.6   GLUCOSE mg/dL 114* 108* 106*   < > 176*    < > = values in this interval not displayed.     Estimated Creatinine Clearance: 15.6 mL/min (A) (by C-G formula based on SCr of 3.48 mg/dL (H)).    Results from last 7 days   Lab Units 06/07/24  0146   PH, ARTERIAL pH units 7.387   PCO2, ARTERIAL mm Hg 35.6   PO2 ART mm Hg 66.0*         I reviewed the patient's results and images.     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  I appreciate the help of nephrology.  We will monitor her response to diuretics and if necessary we can place access for replacement therapy.  Check sputum for now.  Chest x-ray primarily does show atelectasis and effusions.  Will maintain intubation for now until she is a bit more stable.  She does remain at very high risk of worsening.    Plan of care and goals reviewed with mulitdisciplinary/antibiotic stewardship team during rounds.   I discussed the patient's findings and my recommendations with nursing staff and consulting provider     High level of risk due to:  drug(s) requiring intensive monitoring for toxicity and parenteral controlled substances.        Saúl Cao MD, [facility]  Pulmonology and Critical Care Medicine

## 2024-06-07 NOTE — PLAN OF CARE
Goal Outcome Evaluation:  Able to wean FiO2 to 50%, PEEP decreased to 10. Respiratory culture obtained.   IAP 12-15. Abdomen taut.  Bumex administered.  mL. Plan to start dialysis tomorrow. Consent obtained for catheter insertion.  -Propofol @ 45 mcg/kg/min  Cardene off.  Withdraws from pain intermittently. Not following commands.   PRN norco administered x1.   X2 liquid BM.  Family updated at bedside.

## 2024-06-07 NOTE — PAYOR COMM NOTE
"Nena Michel (74 y.o. Female)       Date of Birth   1950    Social Security Number       Address   [address] [place] [address]    Home Phone   [phone]    MRN   [id number]       [facility]    Marital Status                               Admission Date   6/5/24    Admission Type   Elective    Admitting Provider   Theodore Reyes MD    Attending Provider   Theodore Reyes MD    Department, Room/Bed   [facility] 2A ICU, N202/1       Discharge Date       Discharge Disposition       Discharge Destination                                 Attending Provider: Theodore Reyes MD    Allergies: Codeine    Isolation: None   Infection: None   Code Status: CPR    Ht: 168.9 cm (66.5\")   Wt: 80.9 kg (178 lb 5.6 oz)    Admission Cmt: None   Principal Problem: 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 [I71.42]                   Active Insurance as of 6/5/2024       Primary Coverage       Payor Plan Insurance Group Employer/Plan Group    ANTHEM MEDICARE REPLACEMENT ANTHEM MEDICARE ADVANTAGE [id number]       Payor Plan Address Payor Plan Phone Number Payor Plan Fax Number Effective Dates    [address] [phone]  1/1/2024 - None Entered    [facility] [address]         Subscriber Name Subscriber Birth Date Member ID       NENA MICHEL 1950 [id number]                     Emergency Contacts        (Rel.) Home Phone Work Phone Mobile Phone    Irma Borrego (Daughter) -- -- [phone]    Kathy Mcclendon (Daughter) -- -- [phone]              [place]: NPI [id number] Tax ID [id number]  Insurance Information                  ANTHEM MEDICARE REPLACEMENT/ANTHEM MEDICARE ADVANTAGE Phone: [phone]    Subscriber: Nena Michel Subscriber#: [id number]    Group#: [id number] Precert#: --          Current Facility-Administered Medications   Medication Dose Route Frequency Provider Last Rate Last Admin    amLODIPine (NORVASC) tablet 10 mg  10 mg " Nasogastric Q24H Saúl Cao MD   10 mg at 06/07/24 0820    aspirin tablet 325 mg  325 mg Nasogastric Daily Saúl Cao MD   325 mg at 06/07/24 0820    bisoprolol (ZEBeta) tablet 2.5 mg  2.5 mg Nasogastric Daily Saúl Cao MD   2.5 mg at 06/07/24 0820    budesonide (PULMICORT) nebulizer solution 0.5 mg  0.5 mg Nebulization BID - RT Karan Munroe APRN   0.5 mg at 06/07/24 0714    Calcium Replacement - Follow Nurse / BPA Driven Protocol   Does not apply PRN Michell Ugarte MD        chlorhexidine (PERIDEX) 0.12 % solution 15 mL  15 mL Mouth/Throat Q12H Michell Ugarte MD   15 mL at 06/07/24 0820    dextrose (D50W) (25 g/50 mL) IV injection 10-50 mL  10-50 mL Intravenous Q15 Min PRN Karan Munroe APRN        diazePAM (VALIUM) tablet 5 mg  5 mg Nasogastric Daily PRN Saúl Cao MD        Enoxaparin Sodium (LOVENOX) syringe 30 mg  30 mg Subcutaneous Daily Theodore Reyes MD   30 mg at 06/07/24 0820    famotidine (PEPCID) injection 20 mg  20 mg Intravenous Daily Michell Ugarte MD   20 mg at 06/07/24 0812    glucagon (GLUCAGEN) injection 1 mg  1 mg Intramuscular Q15 Min PRN Karan Munroe APRN        HYDROcodone-acetaminophen (NORCO) 5-325 MG per tablet 1 tablet  1 tablet Nasogastric Q4H PRN Saúl Cao MD   1 tablet at 06/06/24 2221    insulin regular (humuLIN R,novoLIN R) injection 2-9 Units  2-9 Units Subcutaneous Q6H Karan Munroe APRN   2 Units at 06/06/24 1738    ipratropium-albuterol (DUO-NEB) nebulizer solution 3 mL  3 mL Nebulization Q4H PRN Yesenia Osorio APRN   3 mL at 06/06/24 1656    ipratropium-albuterol (DUO-NEB) nebulizer solution 3 mL  3 mL Nebulization Q6H - RT Karan Munroe APRN   3 mL at 06/07/24 1327    levothyroxine (SYNTHROID, LEVOTHROID) tablet 100 mcg  100 mcg Nasogastric Q AM Saúl Cao MD   100 mcg at 06/07/24 0610    Magnesium Low Dose Replacement - Follow Nurse / BPA Driven Protocol   Does  not apply PRN Michell Ugarte MD        morphine injection 1 mg  1 mg Intravenous Q4H PRN Saúl Cao MD        And    naloxone (NARCAN) injection 0.4 mg  0.4 mg Intravenous Q5 Min PRN Saúl Cao MD        niCARdipine (CARDENE) 50 mg in sodium chloride 0.9 % 250 mL IVPB  5-15 mg/hr Intravenous Titrated Karan Munroe APRN        nitroglycerin (NITROSTAT) SL tablet 0.4 mg  0.4 mg Sublingual Q5 Min PRN Theodore Reyes MD        ondansetron (ZOFRAN) injection 4 mg  4 mg Intravenous Q6H PRN Michell Ugarte MD        Phosphorus Replacement - Follow Nurse / BPA Driven Protocol   Does not apply PRN Michell Ugarte MD        polyethylene glycol (MIRALAX) packet 17 g  17 g Nasogastric Daily Saúl Cao MD        polyvinyl alcohol (LIQUIFILM) 1.4 % ophthalmic solution 2 drop  2 drop Both Eyes Q1H PRN Michell Ugarte MD        Potassium Replacement - Follow Nurse / BPA Driven Protocol   Does not apply PRN Michell Ugarte MD        propofol (DIPRIVAN) infusion 10 mg/mL 100 mL  5-50 mcg/kg/min Intravenous Titrated Saúl Cao MD 21.8 mL/hr at 06/07/24 1114 45 mcg/kg/min at 06/07/24 1114    sertraline (ZOLOFT) tablet 200 mg  200 mg Nasogastric Daily Saúl Cao MD   200 mg at 06/07/24 0820    sodium chloride 0.9 % flush 10 mL  10 mL Intravenous Q12H Michell Ugarte MD   10 mL at 06/07/24 0821    sodium chloride 0.9 % flush 10 mL  10 mL Intravenous PRN Michell Ugarte MD        sodium chloride 0.9 % infusion 40 mL  40 mL Intravenous PRN Michell Ugarte MD        traZODone (DESYREL) tablet 100 mg  100 mg Nasogastric Nightly Saúl Cao MD   100 mg at 06/06/24 2229     Lab Results (last 24 hours)       Procedure Component Value Units Date/Time    Respiratory Culture - Sputum, ET Suction [[id number]] Collected: 06/07/24 1224    Specimen: Sputum from ET Suction Updated: 06/07/24 1349     Gram Stain Few (2+) WBCs per low  power field      Rare (1+) Epithelial cells per low power field      No organisms seen    Basic Metabolic Panel [[id number]]  (Abnormal) Collected: 06/07/24 1232    Specimen: Blood Updated: 06/07/24 1304     Glucose 114 mg/dL      BUN 42 mg/dL      Creatinine 3.48 mg/dL      Sodium 140 mmol/L      Potassium 4.6 mmol/L      Comment: Specimen hemolyzed.  Result may be falsely elevated.        Chloride 106 mmol/L      CO2 21.0 mmol/L      Calcium 7.2 mg/dL      BUN/Creatinine Ratio 12.1     Anion Gap 13.0 mmol/L      eGFR 13.3 mL/min/1.73      Comment: <15 Indicative of kidney failure       Narrative:      GFR Normal >60  Chronic Kidney Disease <60  Kidney Failure <15    The GFR formula is only valid for adults with stable renal function between ages 18 and 70.    Lactic Acid, Plasma [[id number]]  (Normal) Collected: 06/07/24 1232    Specimen: Blood Updated: 06/07/24 1300     Lactate 1.8 mmol/L      Comment: Falsely depressed results may occur on samples drawn from patients receiving N-Acetylcysteine (NAC) or Metamizole.       CBC (No Diff) [[id number]]  (Abnormal) Collected: 06/07/24 1232    Specimen: Blood Updated: 06/07/24 1248     WBC 10.83 10*3/mm3      RBC 2.54 10*6/mm3      Hemoglobin 8.1 g/dL      Hematocrit 21.6 %      MCV 85.0 fL      MCH 31.9 pg      MCHC 37.5 g/dL      RDW 17.3 %      RDW-SD 53.4 fl      MPV 11.3 fL      Platelets 89 10*3/mm3     POC Glucose Once [[id number]]  (Abnormal) Collected: 06/07/24 1134    Specimen: Blood Updated: 06/07/24 1136     Glucose 137 mg/dL     Basic Metabolic Panel [[id number]]  (Abnormal) Collected: 06/07/24 0502    Specimen: Blood Updated: 06/07/24 0602     Glucose 108 mg/dL      BUN 39 mg/dL      Creatinine 3.70 mg/dL      Sodium 143 mmol/L      Potassium 4.4 mmol/L      Comment: Specimen hemolyzed.  Result may be falsely elevated.        Chloride 110 mmol/L      CO2 21.0 mmol/L      Calcium 7.4 mg/dL      BUN/Creatinine Ratio 10.5     Anion Gap 12.0 mmol/L      eGFR 12.4  mL/min/1.73      Comment: <15 Indicative of kidney failure       Narrative:      GFR Normal >60  Chronic Kidney Disease <60  Kidney Failure <15    The GFR formula is only valid for adults with stable renal function between ages 18 and 70.    POC Glucose Once [[id number]]  (Normal) Collected: 06/07/24 0554    Specimen: Blood Updated: 06/07/24 0555     Glucose 119 mg/dL     Lactic Acid, Plasma [[id number]]  (Normal) Collected: 06/07/24 0502    Specimen: Blood Updated: 06/07/24 0552     Lactate 1.3 mmol/L      Comment: Falsely depressed results may occur on samples drawn from patients receiving N-Acetylcysteine (NAC) or Metamizole.       CBC (No Diff) [[id number]]  (Abnormal) Collected: 06/07/24 0502    Specimen: Blood Updated: 06/07/24 0547     WBC 10.76 10*3/mm3      RBC 2.57 10*6/mm3      Hemoglobin 7.9 g/dL      Hematocrit 21.6 %      MCV 84.0 fL      MCH 30.7 pg      MCHC 36.6 g/dL      RDW 17.1 %      RDW-SD 51.8 fl      MPV 12.1 fL      Platelets 82 10*3/mm3     Blood Gas, Arterial With Co-Ox [[id number]]  (Abnormal) Collected: 06/07/24 0146    Specimen: Arterial Blood Updated: 06/07/24 0146     Site Arterial Line     Jaxson's Test N/A     pH, Arterial 7.387 pH units      pCO2, Arterial 35.6 mm Hg      pO2, Arterial 66.0 mm Hg      Comment: 84 Value below reference range        HCO3, Arterial 21.4 mmol/L      Base Excess, Arterial -3.3 mmol/L      Hemoglobin, Blood Gas 8.1 g/dL      Comment: 84 Value below reference range        Hematocrit, Blood Gas 24.7 %      Oxyhemoglobin 91.2 %      Comment: 84 Value below reference range        Methemoglobin 1.10 %      Carboxyhemoglobin 1.1 %      CO2 Content 22.5 mmol/L      Temperature 37.0     Barometric Pressure for Blood Gas --     Comment: N/A        Modality Ventilator     FIO2 100 %      Ventilator Mode VC+/AC     Set Tidal Volume 0.41     Rate 16 Breaths/minute      PEEP 12.0     PIP 0 cmH2O      Comment: Meter: [id number]     :  [id number]        IPAP 0      EPAP 0     pH, Temp Corrected 7.387 pH Units      pCO2, Temperature Corrected 35.6 mm Hg      pO2, Temperature Corrected 66.0 mm Hg     CBC (No Diff) [[id number]]  (Abnormal) Collected: 06/07/24 0038    Specimen: Blood Updated: 06/07/24 0135     WBC 8.31 10*3/mm3      RBC 2.58 10*6/mm3      Hemoglobin 8.0 g/dL      Hematocrit 21.4 %      MCV 82.9 fL      MCH 31.0 pg      MCHC 37.4 g/dL      RDW 16.8 %      RDW-SD 50.2 fl      MPV 12.1 fL      Platelets 86 10*3/mm3     Basic Metabolic Panel [[id number]]  (Abnormal) Collected: 06/07/24 0038    Specimen: Blood Updated: 06/07/24 0118     Glucose 106 mg/dL      BUN 36 mg/dL      Creatinine 3.32 mg/dL      Sodium 143 mmol/L      Potassium 4.0 mmol/L      Chloride 107 mmol/L      CO2 20.0 mmol/L      Calcium 7.1 mg/dL      BUN/Creatinine Ratio 10.8     Anion Gap 16.0 mmol/L      eGFR 14.1 mL/min/1.73      Comment: <15 Indicative of kidney failure       Narrative:      GFR Normal >60  Chronic Kidney Disease <60  Kidney Failure <15    The GFR formula is only valid for adults with stable renal function between ages 18 and 70.    Lactic Acid, Plasma [[id number]]  (Normal) Collected: 06/07/24 0038    Specimen: Blood Updated: 06/07/24 0112     Lactate 2.0 mmol/L      Comment: Falsely depressed results may occur on samples drawn from patients receiving N-Acetylcysteine (NAC) or Metamizole.       POC Glucose Once [[id number]]  (Normal) Collected: 06/06/24 2336    Specimen: Blood Updated: 06/06/24 2338     Glucose 126 mg/dL     Blood Gas, Arterial With Co-Ox [[id number]]  (Abnormal) Collected: 06/06/24 2004    Specimen: Arterial Blood Updated: 06/06/24 2004     Site Arterial Line     Jaxson's Test N/A     pH, Arterial 7.435 pH units      pCO2, Arterial 32.0 mm Hg      Comment: 84 Value below reference range        pO2, Arterial 48.9 mm Hg      Comment: 84 Value below reference range        HCO3, Arterial 21.5 mmol/L      Base Excess, Arterial -2.4 mmol/L      Hemoglobin, Blood Gas  8.0 g/dL      Comment: 84 Value below reference range        Hematocrit, Blood Gas 24.5 %      Oxyhemoglobin 85.4 %      Comment: 84 Value below reference range        Methemoglobin 0.80 %      Carboxyhemoglobin 1.4 %      CO2 Content 22.5 mmol/L      Temperature 37.0     Barometric Pressure for Blood Gas --     Comment: N/A        Modality Ventilator     FIO2 55 %      Ventilator Mode VC+/AC     Set Tidal Volume 0.48     Rate 18 Breaths/minute      PEEP 5.0     PIP 0 cmH2O      Comment: Meter: [id number]     :  [id number]        IPAP 0     EPAP 0     pH, Temp Corrected 7.435 pH Units      pCO2, Temperature Corrected 32.0 mm Hg      pO2, Temperature Corrected 48.9 mm Hg     Basic Metabolic Panel [[id number]]  (Abnormal) Collected: 06/06/24 1731    Specimen: Blood Updated: 06/06/24 1809     Glucose 136 mg/dL      BUN 35 mg/dL      Creatinine 2.17 mg/dL      Sodium 146 mmol/L      Potassium 4.8 mmol/L      Comment: Slight hemolysis detected by analyzer. Result may be falsely elevated.        Chloride 112 mmol/L      CO2 23.0 mmol/L      Calcium 7.6 mg/dL      BUN/Creatinine Ratio 16.1     Anion Gap 11.0 mmol/L      eGFR 23.5 mL/min/1.73     Narrative:      GFR Normal >60  Chronic Kidney Disease <60  Kidney Failure <15    The GFR formula is only valid for adults with stable renal function between ages 18 and 70.    Lactic Acid, Plasma [[id number]]  (Normal) Collected: 06/06/24 1731    Specimen: Blood Updated: 06/06/24 1805     Lactate 1.6 mmol/L      Comment: Falsely depressed results may occur on samples drawn from patients receiving N-Acetylcysteine (NAC) or Metamizole.       CBC (No Diff) [[id number]]  (Abnormal) Collected: 06/06/24 1731    Specimen: Blood Updated: 06/06/24 1751     WBC 9.84 10*3/mm3      RBC 2.79 10*6/mm3      Hemoglobin 8.3 g/dL      Hematocrit 22.8 %      MCV 81.7 fL      MCH 29.7 pg      MCHC 36.4 g/dL      RDW 16.4 %      RDW-SD 48.6 fl      MPV 11.9 fL      Platelets 99 10*3/mm3      POC Glucose Once [[id number]]  (Abnormal) Collected: 06/06/24 1738    Specimen: Blood Updated: 06/06/24 1738     Glucose 159 mg/dL           Imaging Results (Last 24 Hours)       Procedure Component Value Units Date/Time    XR Chest 1 View [[id number]] Collected: 06/07/24 0105     Updated: 06/07/24 0109    Narrative:      XR CHEST 1 VW    Date of Exam: 6/7/2024 12:46 AM EDT    Indication: Intubated Patient    Comparison: 6/6/2024.    Findings:  Endotracheal tube present with the tip in the proximal to mid trachea. Enteric tube is present within the stomach. The heart appears enlarged. There is a right internal jugular CVC catheter with the tip in the distal SVC. Small to moderate-sized   bilateral pleural effusions are present, left greater than right, progressed on the right as compared to the previous study. Bibasilar airspace disease present likely related to atelectasis. Pulmonary vasculature appears unremarkable. Heart appears   mildly enlarged. No pneumothorax.      Impression:      Impression:  Worsening small right-sided pleural effusion with probable overlying atelectasis. Mild retraction of the endotracheal tube with the tip in the proximal to mid trachea. Otherwise, stable exam.      Electronically Signed: Florencia Michaud MD    6/7/2024 1:06 AM EDT    Workstation ID: [id number]          Orders (last 24 hrs)        Start     Ordered    06/08/24 0600  Blood Gas, Arterial -With Co-Ox Panel: Yes  Daily      Comments: While On Ventilator      06/07/24 0131    06/08/24 0600  Triglycerides  Morning Draw         06/07/24 0943    06/07/24 1401  Ventilator - Vent Mode: AC/VC+; Rate: Other; Rate: 16; FiO2: Titrate Per SpO2; Titrate Oxygen for SpO2: 88% or Greater; PEEP: 10; Tidal Volume: mL; TV: 410  Continuous         06/07/24 1400    06/07/24 1200  bumetanide (BUMEX) injection 2 mg  Once         06/07/24 1108    06/07/24 1137  POC Glucose Once  PROCEDURE ONCE        Comments: Complete no more than 45 minutes prior to  patient eating      06/07/24 1134    06/07/24 1100  propofol (DIPRIVAN) infusion 10 mg/mL 100 mL  Titrated         06/07/24 1013    06/07/24 0943  Respiratory Culture - Sputum, ET Suction  Once         06/07/24 0942    06/07/24 0702  Inpatient Nephrology Consult  IN AM        Specialty:  Nephrology  Provider:  Serafin Junior MD    06/07/24 0129    06/07/24 0700  budesonide (PULMICORT) nebulizer solution 0.5 mg  2 Times Daily - RT         06/07/24 0128    06/07/24 0600  levothyroxine (SYNTHROID, LEVOTHROID) tablet 100 mcg  Every Early Morning         06/06/24 1014    06/07/24 0556  POC Glucose Once  PROCEDURE ONCE        Comments: Complete no more than 45 minutes prior to patient eating      06/07/24 0554    06/07/24 0230  niCARdipine (CARDENE) 50 mg in sodium chloride 0.9 % 250 mL IVPB  Titrated        Note to Pharmacy: Keep sbp less than 150    06/07/24 0130    06/07/24 0200  albuterol sulfate HFA (PROVENTIL HFA;VENTOLIN HFA;PROAIR HFA) inhaler 2 puff  Every 6 Hours,   Status:  Discontinued         06/07/24 0108    06/07/24 0200  ipratropium-albuterol (DUO-NEB) nebulizer solution 3 mL  Every 6 Hours - RT         06/07/24 0109    06/07/24 0200  furosemide (LASIX) injection 80 mg  Once         06/07/24 0110    06/07/24 0200  metOLazone (ZAROXOLYN) tablet 10 mg  Once         06/07/24 0110    06/07/24 0200  Blood Gas, Arterial -With Co-Ox Panel: Yes  Once         06/07/24 0131    06/07/24 0147  Blood Gas, Arterial With Co-Ox  PROCEDURE ONCE         06/07/24 0146    06/07/24 0119  Ventilator - Vent Mode: AC/VC+; Rate: Other; Rate: 16; FiO2: Titrate Per SpO2; Titrate Oxygen for SpO2: 92% or Greater; PEEP: 12; Tidal Volume: mL; TV: 410  Continuous,   Status:  Canceled         06/07/24 0119    06/07/24 0116  Chest Physiotherapy (PD & P)  2 Times Daily - RT       06/07/24 0115    06/07/24 0042  Ventilator - Vent Mode: AC/VC+; Rate: 12; FiO2: Titrate Per SpO2; Titrate Oxygen for SpO2: 92% or Greater; PEEP: 8; Tidal  Volume: mL/kg PBW; mL/k  Continuous,   Status:  Canceled         24 0042    24 2339  POC Glucose Once  PROCEDURE ONCE        Comments: Complete no more than 45 minutes prior to patient eating      24 2336    24  traZODone (DESYREL) tablet 100 mg  Nightly,   Status:  Discontinued         24 0924  rosuvastatin (CRESTOR) tablet 40 mg  Nightly,   Status:  Discontinued         24 0924  traZODone (DESYREL) tablet 100 mg  Nightly         24 1014    24  Blood Gas, Arterial With Co-Ox  PROCEDURE ONCE         24  Blood Gas, Arterial -With Co-Ox Panel: Yes  Timed         24 173  POC Glucose Once  PROCEDURE ONCE        Comments: Complete no more than 45 minutes prior to patient eating      24 171  Ventilator - Vent Mode: AC/VC+; Rate: 12; FiO2: Titrate Per SpO2; Titrate Oxygen for SpO2: 92% or Greater; PEEP: 5; Tidal Volume: mL/kg PBW; mL/k  Continuous,   Status:  Canceled         24 1712    24 1616  Ventilator - Vent Mode: SIMV/VC+; Rate: 12; FiO2: Titrate Per SpO2; Titrate Oxygen for SpO2: 92% or Greater; PEEP: 5; Tidal Volume: mL/kg PBW; mL/k; Pressure Support: 10  Continuous,   Status:  Canceled         24 1615    24 1615  ipratropium-albuterol (DUO-NEB) nebulizer solution 3 mL  Every 4 Hours PRN         24 1615    24 1600  Strict Intake and Output  Every 8 Hours         24 0902    24 1200  POC Glucose Q6H  Every 6 Hours      Comments: Complete no more than 45 minutes prior to patient eating      24 0630    24 1200  polyethylene glycol (MIRALAX) packet 17 g  Daily         24 1041    24 1115  amLODIPine (NORVASC) tablet 10 mg  Every 24 Hours Scheduled         24 1029    24 1100  aspirin tablet 325 mg  Daily         24 1014    24 1100  bisoprolol (ZEBeta)  "tablet 2.5 mg  Daily         06/06/24 1014    06/06/24 1100  sertraline (ZOLOFT) tablet 200 mg  Daily         06/06/24 1014    06/06/24 1013  morphine injection 1 mg  Every 4 Hours PRN        Placed in \"And\" Linked Group    06/06/24 1014    06/06/24 1013  naloxone (NARCAN) injection 0.4 mg  Every 5 Minutes PRN        Placed in \"And\" Linked Group    06/06/24 1014    06/06/24 1013  HYDROcodone-acetaminophen (NORCO) 5-325 MG per tablet 1 tablet  Every 4 Hours PRN         06/06/24 1014    06/06/24 1013  diazePAM (VALIUM) tablet 5 mg  Daily PRN         06/06/24 1014    06/06/24 1000  Incentive Spirometry  Every 2 Hours While Awake       06/06/24 0902    06/06/24 1000  Enoxaparin Sodium (LOVENOX) syringe 30 mg  Daily         06/06/24 0902    06/06/24 1000  niCARdipine (CARDENE) 25mg in 250mL NS infusion  Titrated,   Status:  Discontinued        Note to Pharmacy: Keep sbp less than 150    06/06/24 0902    06/06/24 0902  nitroglycerin (NITROSTAT) SL tablet 0.4 mg  Every 5 Minutes PRN         06/06/24 0902    06/06/24 0902  Arterial Line Monitoring  Every Shift       06/06/24 0902    06/06/24 0902  Monitor Drain Output  Every Shift       06/06/24 0902    06/06/24 0902  ceFAZolin 2000 mg IVPB in 100 mL NS (MBP)  Every 8 Hours         06/06/24 0902    06/06/24 0900  famotidine (PEPCID) injection 20 mg  Daily         06/05/24 1702 06/06/24 0730  dextrose 5 % and sodium chloride 0.45 % infusion  Continuous,   Status:  Discontinued         06/06/24 0630 06/06/24 0730  insulin regular (humuLIN R,novoLIN R) injection 2-9 Units  Every 6 Hours Scheduled         06/06/24 0630 06/06/24 0600  Blood Gas, Arterial -With Co-Ox Panel: Yes  Daily,   Status:  Canceled      Comments: While On Ventilator      06/05/24 1702 06/06/24 0600  XR Chest 1 View  Daily       06/05/24 1702 06/05/24 2100  chlorhexidine (PERIDEX) 0.12 % solution 15 mL  Every 12 Hours Scheduled         06/05/24 1702 06/05/24 2100  sodium chloride 0.9 % " flush 10 mL  Every 12 Hours Scheduled         06/05/24 1707 06/05/24 2040  dextrose (D50W) (25 g/50 mL) IV injection 10-50 mL  Every 15 Minutes PRN         06/05/24 2041 06/05/24 2040  glucagon (GLUCAGEN) injection 1 mg  Every 15 Minutes PRN         06/05/24 2041 06/05/24 2000  Monitor Intra-Abdominal Pressure  Every 4 Hours       06/05/24 1700 06/05/24 2000  Oral Care & Teeth Brushing - Intubated Patient  Every 4 Hours      Comments: [place] Teeth at Least 2x/day    06/05/24 1702 06/05/24 1800  CBC (No Diff)  Every 6 Hours       06/05/24 1703    06/05/24 1800  Lactic Acid, Plasma  Every 6 Hours       06/05/24 1703    06/05/24 1800  Basic Metabolic Panel  Every 6 Hours       06/05/24 1705 06/05/24 1800  Vital Signs Every Hour and Per Hospital Policy Based on Patient Condition  Every Hour       06/05/24 1707 06/05/24 1800  Intake & Output  Every Hour       06/05/24 1707 06/05/24 1745  propofol (DIPRIVAN) infusion 10 mg/mL 100 mL  Titrated,   Status:  Discontinued         06/05/24 1649    06/05/24 1708  Daily Weights  Daily       06/05/24 1707 06/05/24 1707  ondansetron (ZOFRAN) injection 4 mg  Every 6 Hours PRN         06/05/24 1707 06/05/24 1706  Potassium Replacement - Follow Nurse / BPA Driven Protocol  As Needed         06/05/24 1707    06/05/24 1706  Magnesium Low Dose Replacement - Follow Nurse / BPA Driven Protocol  As Needed         06/05/24 1707    06/05/24 1706  Phosphorus Replacement - Follow Nurse / BPA Driven Protocol  As Needed         06/05/24 1707    06/05/24 1706  Calcium Replacement - Follow Nurse / BPA Driven Protocol  As Needed         06/05/24 1707    06/05/24 1705  sodium chloride 0.9 % flush 10 mL  As Needed         06/05/24 1707    06/05/24 1705  sodium chloride 0.9 % infusion 40 mL  As Needed         06/05/24 1707    06/05/24 1702  polyvinyl alcohol (LIQUIFILM) 1.4 % ophthalmic solution 2 drop  Every 1 Hour PRN         06/05/24 1702    Unscheduled  Transfuse RBC, 2  Units Infuse Each Unit Over: 3.5H  Transfusion       06/06/24 0902    Unscheduled  Spontaneous Awakening Trial  Daily - SAT       06/05/24 1702    Unscheduled  Spontaneous Breathing Trial  Daily - SBT       06/05/24 1702    Unscheduled  Treat Hypoglycemia As Recommended By [employer]™ & Notify Provider of Treatment  As Needed      Comments: Follow Hypoglycemia Orders As Outlined in Process Instructions (Open Order Report to View Full Instructions)  Notify Provider Any Time Hypoglycemia Treatment is Administered    06/05/24 2041    Unscheduled  If Insulin Infusion is Paused - Follow [employer] Instructions  As Needed       06/05/24 2041    Signed and Held  famotidine (PEPCID) injection 20 mg  Once,   Status:  Canceled         Signed and Held                     Physician Progress Notes (last 24 hours)        Cherri Gutierrez PA-C at 06/07/24 0939       Summary:[facility]                    LOS: 2 days   Patient Care Team:  Frannie Couch MD as PCP - General      Subjective       Subjective  POD#2 s/p EVAR, followed by emergent left renal artery embolization with stent placement for hemorrhage (6/5/24, Eric). Patient remains intubated and sedated. Hgb with slight drop from yesterday. Daughter is at bedside. Per nursing at bedside, abdomen has remained tight and abdominal pressure has increased. Little to no urine output.    History taken from: chart RN    Objective     Vital Signs  Temp:  [98.4 °F (36.9 °C)-99.9 °F (37.7 °C)] 98.8 °F (37.1 °C)  Heart Rate:  [60-98] 98  Resp:  [14-23] 23  BP: (119-142)/(46-55) 142/55  FiO2 (%):  [35 %-100 %] 70 %    Physical Exam:  Present at bedside: nursing and daughter  General: no acute distress, intubated and sedated   Respiratory: ETT tube secured, oral gastric tube in place  Abdomen: tight and distended  Bilateral Groins: access sites c/d/I, no surrounding ecchymosis, no evidence of hematoma  Extremities: warm and pink without edema  Vascular: palpable RIGHT  DP pulse, audible LEFT DP signal      Results Review:     I reviewed the patient's new clinical results.  CBC    Results from last 7 days   Lab Units 06/07/24  0502 06/07/24 0038 06/06/24 1731 06/06/24  1240 06/06/24  0514 06/06/24  0034 06/05/24  1643   WBC 10*3/mm3 10.76 8.31 9.84 10.98* 8.98 9.78 10.53   HEMOGLOBIN g/dL 7.9* 8.0* 8.3* 9.2* 10.0* 10.1* 10.0*   PLATELETS 10*3/mm3 82* 86* 99* 118* 146 146 125*     BMP   Results from last 7 days   Lab Units 06/07/24  0502 06/07/24 0038 06/06/24 1731 06/06/24 1240 06/06/24  0514 06/06/24  0034 06/05/24  1933   SODIUM mmol/L 143 143 146* 146* 148* 148* 151*   POTASSIUM mmol/L 4.4 4.0 4.8 4.7 4.1 3.6 3.0*   CHLORIDE mmol/L 110* 107 112* 112* 114* 114* 111*   CO2 mmol/L 21.0* 20.0* 23.0 23.0 25.0 23.0 18.0*   BUN mg/dL 39* 36* 35* 34* 31* 30* 27*   CREATININE mg/dL 3.70* 3.32* 2.17* 2.67* 2.41* 2.20* 1.83*   GLUCOSE mg/dL 108* 106* 136* 143* 73 176* 256*   MAGNESIUM mg/dL  --   --   --   --   --  1.6  --      CMP   Results from last 7 days   Lab Units 06/07/24  0502 06/07/24 0038 06/06/24 1731 06/06/24  1240 06/06/24  0514 06/06/24  0034 06/05/24  1933   SODIUM mmol/L 143 143 146* 146* 148* 148* 151*   POTASSIUM mmol/L 4.4 4.0 4.8 4.7 4.1 3.6 3.0*   CHLORIDE mmol/L 110* 107 112* 112* 114* 114* 111*   CO2 mmol/L 21.0* 20.0* 23.0 23.0 25.0 23.0 18.0*   BUN mg/dL 39* 36* 35* 34* 31* 30* 27*   CREATININE mg/dL 3.70* 3.32* 2.17* 2.67* 2.41* 2.20* 1.83*   GLUCOSE mg/dL 108* 106* 136* 143* 73 176* 256*     ABG    Results from last 7 days   Lab Units 06/07/24  0146 06/06/24 2004 06/06/24  0330 06/05/24  2152 06/05/24  1738 06/05/24  1538 06/05/24  1522   PH, ARTERIAL pH units 7.387 7.435 7.466* 7.404 7.323* 7.31* 7.21*   PCO2, ARTERIAL mm Hg 35.6 32.0* 35.8 32.1* 35.4  --   --    PO2 ART mm Hg 66.0* 48.9* 72.5* 68.9* 298.0*  --   --    BASE EXCESS ART mmol/L -3.3* -2.4* 2.1* -3.9* -7.0*  --   --      UA           Current Facility-Administered Medications:     amLODIPine  (NORVASC) tablet 10 mg, 10 mg, Nasogastric, Q24H, Saúl Cao MD, 10 mg at 06/07/24 0820    aspirin tablet 325 mg, 325 mg, Nasogastric, Daily, Saúl Cao MD, 325 mg at 06/07/24 0820    bisoprolol (ZEBeta) tablet 2.5 mg, 2.5 mg, Nasogastric, Daily, Saúl Cao MD, 2.5 mg at 06/07/24 0820    budesonide (PULMICORT) nebulizer solution 0.5 mg, 0.5 mg, Nebulization, BID - RT, Karan Munroe APRN, 0.5 mg at 06/07/24 0714    Calcium Replacement - Follow Nurse / BPA Driven Protocol, , Does not apply, PRN, Michell Ugarte MD    chlorhexidine (PERIDEX) 0.12 % solution 15 mL, 15 mL, Mouth/Throat, Q12H, Michell Ugarte MD, 15 mL at 06/07/24 0820    dextrose (D50W) (25 g/50 mL) IV injection 10-50 mL, 10-50 mL, Intravenous, Q15 Min PRN, Karan Munroe APRN    diazePAM (VALIUM) tablet 5 mg, 5 mg, Nasogastric, Daily PRN, Saúl Cao MD    Enoxaparin Sodium (LOVENOX) syringe 30 mg, 30 mg, Subcutaneous, Daily, Theodore Reyes MD, 30 mg at 06/07/24 0820    famotidine (PEPCID) injection 20 mg, 20 mg, Intravenous, Daily, Michell Ugarte MD, 20 mg at 06/07/24 0812    glucagon (GLUCAGEN) injection 1 mg, 1 mg, Intramuscular, Q15 Min PRN, Karan Munroe APRN    HYDROcodone-acetaminophen (NORCO) 5-325 MG per tablet 1 tablet, 1 tablet, Nasogastric, Q4H PRN, Saúl Cao MD, 1 tablet at 06/06/24 2221    insulin regular (humuLIN R,novoLIN R) injection 2-9 Units, 2-9 Units, Subcutaneous, Q6H, Karan Munroe APRN, 2 Units at 06/06/24 1738    ipratropium-albuterol (DUO-NEB) nebulizer solution 3 mL, 3 mL, Nebulization, Q4H PRN, Yesenia Osorio APRN, 3 mL at 06/06/24 1656    ipratropium-albuterol (DUO-NEB) nebulizer solution 3 mL, 3 mL, Nebulization, Q6H - RT, Karan Munroe APRN, 3 mL at 06/07/24 0714    levothyroxine (SYNTHROID, LEVOTHROID) tablet 100 mcg, 100 mcg, Nasogastric, Q AM, Saúl Cao MD, 100 mcg at 06/07/24 0610    Magnesium Low Dose  Replacement - Follow Nurse / BPA Driven Protocol, , Does not apply, PRN, Michell Ugarte MD    morphine injection 1 mg, 1 mg, Intravenous, Q4H PRN **AND** naloxone (NARCAN) injection 0.4 mg, 0.4 mg, Intravenous, Q5 Min PRN, Saúl Cao MD    niCARdipine (CARDENE) 50 mg in sodium chloride 0.9 % 250 mL IVPB, 5-15 mg/hr, Intravenous, Titrated, Karan Munroe APRN    nitroglycerin (NITROSTAT) SL tablet 0.4 mg, 0.4 mg, Sublingual, Q5 Min PRN, Theodore Reyes MD    ondansetron (ZOFRAN) injection 4 mg, 4 mg, Intravenous, Q6H PRN, Michell Ugarte MD    Phosphorus Replacement - Follow Nurse / BPA Driven Protocol, , Does not apply, PRN, Michell Ugarte MD    polyethylene glycol (MIRALAX) packet 17 g, 17 g, Nasogastric, Daily, Saúl Cao MD    polyvinyl alcohol (LIQUIFILM) 1.4 % ophthalmic solution 2 drop, 2 drop, Both Eyes, Q1H PRN, Michell Ugarte MD    Potassium Replacement - Follow Nurse / BPA Driven Protocol, , Does not apply, PRN, Michell Ugarte MD    propofol (DIPRIVAN) infusion 10 mg/mL 100 mL, 5-50 mcg/kg/min, Intravenous, Titrated, Saúl Cao MD, Last Rate: 19.62 mL/hr at 06/07/24 0628, 50 mcg/kg/min at 06/07/24 0628    sertraline (ZOLOFT) tablet 200 mg, 200 mg, Nasogastric, Daily, Saúl Cao MD, 200 mg at 06/07/24 0820    sodium chloride 0.9 % flush 10 mL, 10 mL, Intravenous, Q12H, Michell Ugarte MD, 10 mL at 06/07/24 0821    sodium chloride 0.9 % flush 10 mL, 10 mL, Intravenous, PRN, Michell Ugarte MD    sodium chloride 0.9 % infusion 40 mL, 40 mL, Intravenous, PRN, Michell Ugarte MD    traZODone (DESYREL) tablet 100 mg, 100 mg, Nasogastric, Nightly, Saúl Cao MD, 100 mg at 06/06/24 2221     Assessment & Plan   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  Large-bore bilateral femoral sheath placement for  endovascular graft deployment.  Right 22 Nepali and left 12 Nepali  3.  Bifurcated endovascular repair of aortic aneurysm extending into the right external iliac artery and left common iliac artery  - 6/5/24 (Eric):  1.  Left renal distal inferior artery embolization for hemorrhage  2.  Graftmaster covered stent placement for distal renal artery thrombus versus dissection  - Labs reviewed: Hgb drop to 7.9, received 7units pRBC 6/5, will continue to trend   - CXR reviewed: worsening small right-sided pleural effusion with probable overlying atelectasis.  - SBP recommendations <160  - Cardene drip discontinued  - continue aspirin, lovenox ppx  - continue IV fluids  - continue to monitor abdominal pressures, doubled pressures since yesterday  - Ventilation settings increased with peak of 12  - Pulm/CC following, appreciate assistance   - nephrology consulted for worsening renal function    Patient's case was reviewed in detail with Dr. Herrera who helped direct the above plan of care. Plan reviewed with nursing and daughter at bedside who verbalized understanding and agreement.     Cherri Gutierrez PA-C  06/07/24  09:39 EDT           Electronically signed by Cherri Gutierrez PA-C at 06/07/24 0950       Consult Notes (last 24 hours)  Notes from 06/06/24 1426 through 06/07/24 1426   No notes of this type exist for this encounter.

## 2024-06-07 NOTE — CONSULTS
NAL Consult Note    Dalila Velez  1950  [id number]    Date of Admit:  6/5/2024  Date of Consult: 6/7/2024    Reason for Consultation: Acute kidney injury     History of present illness: [History is obtained from chart review]     Patient is a 74 y.o.  Yr old female with past medical history of CKD stage III (baseline creatinine 1.4-1.5), hypertension, peripheral vascular disease, coronary artery disease, hypothyroidism, renal artery stenosis,  who underwent repair of juxta renal abdominal aortic aneurysm repair on 6/5; postoperatively patient became hemodynamically unstable and was found to have hemoglobin drop from 12.9 > 6.6.  Patient was also complaining of back pain.  CT angiogram was performed immediately which demonstrated active extravasation from the left kidney.  Patient was emergently taken back to the OR and had embolization of left renal distal inferior artery.  During the hospital course; renal function continues to worse; today creatinine peaked to 3.70 mg/dL.  Nephrology is consulted for acute renal failure.       Past Medical History:   Diagnosis Date    Anxiety     Carotid artery disease     Coronary artery disease     Depression     Dyslipidemia     Eczema     GERD (gastroesophageal reflux disease)     Hypertension     Hypothyroidism     Hypothyroidism with nodule.    Peptic ulcer disease     Renal artery stenosis        Past Surgical History:   Procedure Laterality Date    ABDOMINAL AORTIC ANEURYSM REPAIR N/A 6/5/2024    Procedure: ENDOVASCULAR REPAIR OF AORTIC ANEURYSM with fenestration of the left renal artery, Large-bore bilateral femoral sheath placement for endovascular graft deployment.  Right 22 Kinyarwanda and left 12 Kinyarwanda, Bifurcated endovascular repair of aortic aneurysm extending into the right external iliac artery and left common iliac artery;  Surgeon: Theodore Reyes MD;  Location: [facility] OR;  Servi    CAROTID ENDARTERECTOMY Left 2006    Left carotid endarterectomy by   , 2006.     CATARACT EXTRACTION, BILATERAL      CERVICAL SPINE SURGERY      CHOLECYSTECTOMY      COLONOSCOPY      CORONARY ANGIOPLASTY      History of catheter-based interventions; incomplete database.     HYSTERECTOMY      RENAL ARTERY STENT Bilateral 2012    Status post bilateral renal artery stenting by Dr. Landis in 2012.  Right renal artery stented with a 4 mm stent.  Left renal was treated with a 5.0 x 15 mm bare-metal stent.      TOTAL HIP ARTHROPLASTY Left 06/2017       Social History     Socioeconomic History    Marital status:    Tobacco Use    Smoking status: Every Day     Current packs/day: 1.00     Average packs/day: 1 pack/day for 48.4 years (48.4 ttl pk-yrs)     Types: Cigarettes     Start date: 1976    Smokeless tobacco: Never   Vaping Use    Vaping status: Never Used   Substance and Sexual Activity    Alcohol use: No    Drug use: No    Sexual activity: Defer       family history includes Cancer in her mother; Coronary artery disease in her brother, brother, father, sister, sister, sister, and sister; Heart attack in her brother; Heart disease in her brother, brother, mother, and sister; Heart failure in her brother and mother; No Known Problems in her maternal grandfather, maternal grandmother, paternal grandfather, and paternal grandmother.    Allergies   Allergen Reactions    Codeine Nausea And Vomiting       Medication:    Current Facility-Administered Medications:     amLODIPine (NORVASC) tablet 10 mg, 10 mg, Nasogastric, Q24H, Saúl Cao MD, 10 mg at 06/06/24 1240    aspirin tablet 325 mg, 325 mg, Nasogastric, Daily, Saúl Cao MD, 325 mg at 06/06/24 1045    bisoprolol (ZEBeta) tablet 2.5 mg, 2.5 mg, Nasogastric, Daily, Saúl Cao MD, 2.5 mg at 06/06/24 1044    budesonide (PULMICORT) nebulizer solution 0.5 mg, 0.5 mg, Nebulization, BID - RT, Karan Munroe APRN, 0.5 mg at 06/07/24 0714    Calcium Replacement - Follow Nurse / BPA Driven Protocol, ,  Does not apply, PRN, Michell Ugarte MD    chlorhexidine (PERIDEX) 0.12 % solution 15 mL, 15 mL, Mouth/Throat, Q12H, Michell Ugarte MD, 15 mL at 06/07/24 0048    dextrose (D50W) (25 g/50 mL) IV injection 10-50 mL, 10-50 mL, Intravenous, Q15 Min PRN, Karan Munroe APRN    diazePAM (VALIUM) tablet 5 mg, 5 mg, Nasogastric, Daily PRN, Saúl Cao MD    Enoxaparin Sodium (LOVENOX) syringe 30 mg, 30 mg, Subcutaneous, Daily, Theodore Reyes MD, 30 mg at 06/06/24 1054    famotidine (PEPCID) injection 20 mg, 20 mg, Intravenous, Daily, Michell Ugarte MD, 20 mg at 06/06/24 0826    glucagon (GLUCAGEN) injection 1 mg, 1 mg, Intramuscular, Q15 Min PRN, Karan Munroe APRN    HYDROcodone-acetaminophen (NORCO) 5-325 MG per tablet 1 tablet, 1 tablet, Nasogastric, Q4H PRN, Saúl Cao MD, 1 tablet at 06/06/24 2221    insulin regular (humuLIN R,novoLIN R) injection 2-9 Units, 2-9 Units, Subcutaneous, Q6H, Karan Munroe APRN, 2 Units at 06/06/24 1738    ipratropium-albuterol (DUO-NEB) nebulizer solution 3 mL, 3 mL, Nebulization, Q4H PRN, Yesenia Osorio APRN, 3 mL at 06/06/24 1656    ipratropium-albuterol (DUO-NEB) nebulizer solution 3 mL, 3 mL, Nebulization, Q6H - RT, Karan Munroe APRN, 3 mL at 06/07/24 0714    levothyroxine (SYNTHROID, LEVOTHROID) tablet 100 mcg, 100 mcg, Nasogastric, Q AM, Saúl Cao MD, 100 mcg at 06/07/24 0610    Magnesium Low Dose Replacement - Follow Nurse / BPA Driven Protocol, , Does not apply, PRN, Michell Ugarte MD    morphine injection 1 mg, 1 mg, Intravenous, Q4H PRN **AND** naloxone (NARCAN) injection 0.4 mg, 0.4 mg, Intravenous, Q5 Min PRN, Saúl Cao MD    niCARdipine (CARDENE) 50 mg in sodium chloride 0.9 % 250 mL IVPB, 5-15 mg/hr, Intravenous, Titrated, Karan Munroe, APRN    nitroglycerin (NITROSTAT) SL tablet 0.4 mg, 0.4 mg, Sublingual, Q5 Min PRN, Theodore Reyes MD    ondansetron (ZOFRAN) injection 4 mg,  4 mg, Intravenous, Q6H PRN, Michell Ugarte MD    Phosphorus Replacement - Follow Nurse / BPA Driven Protocol, , Does not apply, PRN, Michell Ugarte MD    polyethylene glycol (MIRALAX) packet 17 g, 17 g, Nasogastric, Daily, Saúl Cao MD    polyvinyl alcohol (LIQUIFILM) 1.4 % ophthalmic solution 2 drop, 2 drop, Both Eyes, Q1H PRN, Michell Ugarte MD    Potassium Replacement - Follow Nurse / BPA Driven Protocol, , Does not apply, PRN, Michell Ugarte MD    propofol (DIPRIVAN) infusion 10 mg/mL 100 mL, 5-50 mcg/kg/min, Intravenous, Titrated, Saúl Cao MD, Last Rate: 19.62 mL/hr at 06/07/24 0628, 50 mcg/kg/min at 06/07/24 0628    sertraline (ZOLOFT) tablet 200 mg, 200 mg, Nasogastric, Daily, Saúl Cao MD, 200 mg at 06/06/24 1045    sodium chloride 0.9 % flush 10 mL, 10 mL, Intravenous, Q12H, Michell Ugarte MD, 10 mL at 06/06/24 0851    sodium chloride 0.9 % flush 10 mL, 10 mL, Intravenous, PRN, Michell Ugarte MD    sodium chloride 0.9 % infusion 40 mL, 40 mL, Intravenous, PRN, Michell Ugarte MD    traZODone (DESYREL) tablet 100 mg, 100 mg, Nasogastric, Nightly, Saúl Cao MD, 100 mg at 06/06/24 2221    Medications Prior to Admission   Medication Sig Dispense Refill Last Dose    aspirin  MG tablet Take 1 tablet by mouth Daily. 30 tablet 0 6/5/2024 at 0500    bisoprolol (ZEBeta) 5 MG tablet Take 0.5 tablets by mouth Daily.   6/4/2024 at 2300    diazePAM (VALIUM) 5 MG tablet Take 1 tablet by mouth Daily As Needed for Anxiety.   Past Week    levothyroxine (SYNTHROID, LEVOTHROID) 100 MCG tablet Take 1 tablet by mouth Daily.   6/4/2024 at 2300    NIFEdipine CC (ADALAT CC) 90 MG 24 hr tablet Take 1 tablet by mouth Daily.   6/5/2024 at 0500    omeprazole (priLOSEC) 40 MG capsule Take 1 capsule by mouth Daily.   6/5/2024 at 0500    polyethylene glycol (MiraLax) 17 GM/SCOOP powder Take 17 g by mouth Daily.   Past Month     "rosuvastatin (CRESTOR) 40 MG tablet Take 1 tablet by mouth Every Night. 90 tablet 3 6/4/2024 at 2300    sertraline (ZOLOFT) 100 MG tablet Take 2 tablets by mouth Daily.   6/5/2024 at 0500    traZODone (DESYREL) 100 MG tablet Take 2 tablets by mouth Every Night.   6/4/2024    nitroglycerin (NITROSTAT) 0.4 MG SL tablet 1 under the tongue as needed for angina, may repeat q5mins for up three doses 100 tablet 3     Vitamin D, Ergocalciferol, 12379 units capsule Take 1 capsule by mouth 1 (One) Time Per Week.          Review of Systems:  Unable to obtain review of system    Physical Exam:   Vital Signs   Blood pressure 122/55, pulse 89, temperature 99.5 °F (37.5 °C), temperature source Bladder, resp. rate 17, height 168.9 cm (66.5\"), weight 80.9 kg (178 lb 5.6 oz), SpO2 94%.     GENERAL: Intubated and sedated.   HEENT: Normocephalic, atraumatic.    NECK: Supple   HEART: RRR; No murmur, rubs, gallops. Trace edema  LUNGS: Mechanical breath sounds.   ABDOMEN: Soft, nontender, nondistended..  EXT:  Trace edema.  : + Rios   SKIN: Warm and dry without rash  NEURO: Unable to assess  PSYCHIATRIC: Unable to assess    Laboratory Data  Results from last 7 days   Lab Units 06/07/24  0502 06/07/24  0038 06/06/24  1731   HEMOGLOBIN g/dL 7.9* 8.0* 8.3*   HEMATOCRIT % 21.6* 21.4* 22.8*     Results from last 7 days   Lab Units 06/07/24  0502 06/07/24  0038 06/06/24  1731 06/06/24  1240 06/06/24  0514 06/06/24  0034   SODIUM mmol/L 143 143 146* 146*   < > 148*   POTASSIUM mmol/L 4.4 4.0 4.8 4.7   < > 3.6   CHLORIDE mmol/L 110* 107 112* 112*   < > 114*   CO2 mmol/L 21.0* 20.0* 23.0 23.0   < > 23.0   BUN mg/dL 39* 36* 35* 34*   < > 30*   CREATININE mg/dL 3.70* 3.32* 2.17* 2.67*   < > 2.20*   CALCIUM mg/dL 7.4* 7.1* 7.6* 7.5*   < > 8.0*   MAGNESIUM mg/dL  --   --   --   --   --  1.6    < > = values in this interval not displayed.     Results from last 7 days   Lab Units 06/07/24  0502   GLUCOSE mg/dL 108*             Estimated Creatinine " Clearance: 14.7 mL/min (A) (by C-G formula based on SCr of 3.7 mg/dL (H)).    Radiology:  Chest  Xray:   IMPRESSION:  Impression:  Worsening small right-sided pleural effusion with probable overlying atelectasis. Mild retraction of the endotracheal tube with the tip in the proximal to mid trachea    CT abdomen:   Impression:  1. Left renal artery stent with high-grade stenosis just distal to the stent, possibly from a dissection flap.  2. Large active extravasation from the mid pole left renal cortex with a large pararenal and retroperitoneal hematoma.   3. Left lower lobe atelectasis, which was noted at time of dictation. These findings were being communicated to the OR.  4. Abdominal aortic aneurysm status post endograft repair.    Impression:    Acute renal failure  Abdominal aortic aneurysm repair 6/5  Left renal hemorrhage s/p distal inferior branch embolization  Right atrophic kidney  Acute blood loss anemia  Hypertension   Dyslipidemia  Coronary artery disease    PLAN: Thank you for asking us to see Dalila Velez, I recommend the following:     Acute renal failure on CKD stage III:   - Baseline renal function 1.4-1.5.  Patient follows up with Dr. Brown.  - Right atrophic kidney.  Left renal hemorrhage s/p distal inferior branch embolization 6/5.   - Urine output minimal.  Will give a dose of Bumex 2 mg.  If no improvement in urine output.  High risk of needing dialysis in next 12 hours.  Extensively discussed with daughter at bedside and another daughter on the phone.  Answered their questions.   - Strict ROBERTO's.   - Dose meds to GFR   - Avoid nephrotoxins  - No emergent indication for dialysis right now; but would likely need in next 12 to 24 hours if no improvement in urine output.     Acute blood loss anemia:   -S/p 7 units of PRBC.  -Transfuse as needed to keep hemoglobin above 7.     Abdominal aortic aneurysm repair:   -Vascular following.     Hypertension:   -S/p Cardene drip.  Now off.  Blood pressure  control.    Thank you for the consult, will follow with you closely.     High risk and critically ill patient.     Serafin Junior MD  6/7/2024  08:10 EDT

## 2024-06-07 NOTE — PROGRESS NOTES
LOS: 2 days   Patient Care Team:  Frannie Couch MD as PCP - General      Subjective       Subjective  POD#2 s/p EVAR, followed by emergent left renal artery embolization with stent placement for hemorrhage (6/5/24, Eric). Patient remains intubated and sedated. Hgb with slight drop from yesterday. Daughter is at bedside. Per nursing at bedside, abdomen has remained tight and abdominal pressure has increased. Little to no urine output.    History taken from: chart RN    Objective     Vital Signs  Temp:  [98.4 °F (36.9 °C)-99.9 °F (37.7 °C)] 98.8 °F (37.1 °C)  Heart Rate:  [60-98] 98  Resp:  [14-23] 23  BP: (119-142)/(46-55) 142/55  FiO2 (%):  [35 %-100 %] 70 %    Physical Exam:  Present at bedside: nursing and daughter  General: no acute distress, intubated and sedated   Respiratory: ETT tube secured, oral gastric tube in place  Abdomen: tight and distended  Bilateral Groins: access sites c/d/I, no surrounding ecchymosis, no evidence of hematoma  Extremities: warm and pink without edema  Vascular: palpable RIGHT DP pulse, audible LEFT DP signal      Results Review:     I reviewed the patient's new clinical results.  CBC    Results from last 7 days   Lab Units 06/07/24  0502 06/07/24  0038 06/06/24  1731 06/06/24  1240 06/06/24  0514 06/06/24  0034 06/05/24  1643   WBC 10*3/mm3 10.76 8.31 9.84 10.98* 8.98 9.78 10.53   HEMOGLOBIN g/dL 7.9* 8.0* 8.3* 9.2* 10.0* 10.1* 10.0*   PLATELETS 10*3/mm3 82* 86* 99* 118* 146 146 125*     BMP   Results from last 7 days   Lab Units 06/07/24  0502 06/07/24  0038 06/06/24  1731 06/06/24  1240 06/06/24  0514 06/06/24  0034 06/05/24  1933   SODIUM mmol/L 143 143 146* 146* 148* 148* 151*   POTASSIUM mmol/L 4.4 4.0 4.8 4.7 4.1 3.6 3.0*   CHLORIDE mmol/L 110* 107 112* 112* 114* 114* 111*   CO2 mmol/L 21.0* 20.0* 23.0 23.0 25.0 23.0 18.0*   BUN mg/dL 39* 36* 35* 34* 31* 30* 27*   CREATININE mg/dL 3.70* 3.32* 2.17* 2.67* 2.41* 2.20* 1.83*   GLUCOSE mg/dL 108* 106* 136* 143* 73 176*  256*   MAGNESIUM mg/dL  --   --   --   --   --  1.6  --      CMP   Results from last 7 days   Lab Units 06/07/24  0502 06/07/24  0038 06/06/24  1731 06/06/24  1240 06/06/24  0514 06/06/24  0034 06/05/24  1933   SODIUM mmol/L 143 143 146* 146* 148* 148* 151*   POTASSIUM mmol/L 4.4 4.0 4.8 4.7 4.1 3.6 3.0*   CHLORIDE mmol/L 110* 107 112* 112* 114* 114* 111*   CO2 mmol/L 21.0* 20.0* 23.0 23.0 25.0 23.0 18.0*   BUN mg/dL 39* 36* 35* 34* 31* 30* 27*   CREATININE mg/dL 3.70* 3.32* 2.17* 2.67* 2.41* 2.20* 1.83*   GLUCOSE mg/dL 108* 106* 136* 143* 73 176* 256*     ABG    Results from last 7 days   Lab Units 06/07/24  0146 06/06/24  2004 06/06/24  0330 06/05/24  2152 06/05/24  1738 06/05/24  1538 06/05/24  1522   PH, ARTERIAL pH units 7.387 7.435 7.466* 7.404 7.323* 7.31* 7.21*   PCO2, ARTERIAL mm Hg 35.6 32.0* 35.8 32.1* 35.4  --   --    PO2 ART mm Hg 66.0* 48.9* 72.5* 68.9* 298.0*  --   --    BASE EXCESS ART mmol/L -3.3* -2.4* 2.1* -3.9* -7.0*  --   --      UA           Current Facility-Administered Medications:     amLODIPine (NORVASC) tablet 10 mg, 10 mg, Nasogastric, Q24H, Saúl Cao MD, 10 mg at 06/07/24 0820    aspirin tablet 325 mg, 325 mg, Nasogastric, Daily, Saúl Cao MD, 325 mg at 06/07/24 0820    bisoprolol (ZEBeta) tablet 2.5 mg, 2.5 mg, Nasogastric, Daily, Saúl Cao MD, 2.5 mg at 06/07/24 0820    budesonide (PULMICORT) nebulizer solution 0.5 mg, 0.5 mg, Nebulization, BID - RT, Karan Munroe APRN, 0.5 mg at 06/07/24 0714    Calcium Replacement - Follow Nurse / BPA Driven Protocol, , Does not apply, PRN, Michell Ugarte MD    chlorhexidine (PERIDEX) 0.12 % solution 15 mL, 15 mL, Mouth/Throat, Q12H, Michell Ugarte MD, 15 mL at 06/07/24 0820    dextrose (D50W) (25 g/50 mL) IV injection 10-50 mL, 10-50 mL, Intravenous, Q15 Min PRN, Karan Munroe APRN    diazePAM (VALIUM) tablet 5 mg, 5 mg, Nasogastric, Daily PRN, Saúl Cao MD    Enoxaparin Sodium  (LOVENOX) syringe 30 mg, 30 mg, Subcutaneous, Daily, Theodore Reyes MD, 30 mg at 06/07/24 0820    famotidine (PEPCID) injection 20 mg, 20 mg, Intravenous, Daily, Michell Ugarte MD, 20 mg at 06/07/24 0812    glucagon (GLUCAGEN) injection 1 mg, 1 mg, Intramuscular, Q15 Min PRN, Karan Munroe APRN    HYDROcodone-acetaminophen (NORCO) 5-325 MG per tablet 1 tablet, 1 tablet, Nasogastric, Q4H PRN, Saúl Cao MD, 1 tablet at 06/06/24 2221    insulin regular (humuLIN R,novoLIN R) injection 2-9 Units, 2-9 Units, Subcutaneous, Q6H, Karan Munroe APRN, 2 Units at 06/06/24 1738    ipratropium-albuterol (DUO-NEB) nebulizer solution 3 mL, 3 mL, Nebulization, Q4H PRN, Yesenia Osorio APRN, 3 mL at 06/06/24 1656    ipratropium-albuterol (DUO-NEB) nebulizer solution 3 mL, 3 mL, Nebulization, Q6H - RT, Karan Munroe APRN, 3 mL at 06/07/24 0714    levothyroxine (SYNTHROID, LEVOTHROID) tablet 100 mcg, 100 mcg, Nasogastric, Q AM, Saúl Cao MD, 100 mcg at 06/07/24 0610    Magnesium Low Dose Replacement - Follow Nurse / BPA Driven Protocol, , Does not apply, PRN, Michell Ugarte MD    morphine injection 1 mg, 1 mg, Intravenous, Q4H PRN **AND** naloxone (NARCAN) injection 0.4 mg, 0.4 mg, Intravenous, Q5 Min PRN, Saúl Cao MD    niCARdipine (CARDENE) 50 mg in sodium chloride 0.9 % 250 mL IVPB, 5-15 mg/hr, Intravenous, Titrated, aKran Munroe APRN    nitroglycerin (NITROSTAT) SL tablet 0.4 mg, 0.4 mg, Sublingual, Q5 Min PRN, Theodore Reyes MD    ondansetron (ZOFRAN) injection 4 mg, 4 mg, Intravenous, Q6H PRN, Michell Ugarte MD    Phosphorus Replacement - Follow Nurse / BPA Driven Protocol, , Does not apply, PRN, Michell Ugarte MD    polyethylene glycol (MIRALAX) packet 17 g, 17 g, Nasogastric, Daily, Saúl Cao MD    polyvinyl alcohol (LIQUIFILM) 1.4 % ophthalmic solution 2 drop, 2 drop, Both Eyes, Q1H PRN, Michell Ugarte MD    Potassium  Replacement - Follow Nurse / BPA Driven Protocol, , Does not apply, PRN, Michell Ugarte MD    propofol (DIPRIVAN) infusion 10 mg/mL 100 mL, 5-50 mcg/kg/min, Intravenous, Titrated, Saúl Cao MD, Last Rate: 19.62 mL/hr at 06/07/24 0628, 50 mcg/kg/min at 06/07/24 0628    sertraline (ZOLOFT) tablet 200 mg, 200 mg, Nasogastric, Daily, Saúl Cao MD, 200 mg at 06/07/24 0820    sodium chloride 0.9 % flush 10 mL, 10 mL, Intravenous, Q12H, Michell Ugarte MD, 10 mL at 06/07/24 0821    sodium chloride 0.9 % flush 10 mL, 10 mL, Intravenous, PRN, Michell Ugarte MD    sodium chloride 0.9 % infusion 40 mL, 40 mL, Intravenous, PRN, Michell Ugarte MD    traZODone (DESYREL) tablet 100 mg, 100 mg, Nasogastric, Nightly, Saúl Cao MD, 100 mg at 06/06/24 2221     Assessment & Plan   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  Large-bore bilateral femoral sheath placement for endovascular graft deployment.  Right 22 [place] and left 12 [place]  3.  Bifurcated endovascular repair of aortic aneurysm extending into the right external iliac artery and left common iliac artery  - 6/5/24 (Eric):  1.  Left renal distal inferior artery embolization for hemorrhage  2.  Graftmaster covered stent placement for distal renal artery thrombus versus dissection  - Labs reviewed: Hgb drop to 7.9, received 7units pRBC 6/5, will continue to trend   - CXR reviewed: worsening small right-sided pleural effusion with probable overlying atelectasis.  - SBP recommendations <160  - Cardene drip discontinued  - continue aspirin, lovenox ppx  - continue IV fluids  - continue to monitor abdominal pressures, doubled pressures since yesterday  - Ventilation settings increased with peak of 12  - Pulm/CC following, appreciate assistance   - nephrology consulted for worsening renal function    Patient's case was  reviewed in detail with Dr. Herrera who helped direct the above plan of care. Plan reviewed with nursing and daughter at bedside who verbalized understanding and agreement.     Cherri Gutierrez PA-C  06/07/24  09:39 EDT

## 2024-06-08 NOTE — PROCEDURES
"Non-Tunneled Catheter    Date/Time: 6/8/2024 12:22 PM    Performed by: Yesenia Osorio APRN  Authorized by: Yesenia Osorio APRN  Consent: Written consent obtained.  Consent given by: power of   Required items: required blood products, implants, devices, and special equipment available  Patient identity confirmed: arm band and provided demographic data  Time out: Immediately prior to procedure a \"time out\" was called to verify the correct patient, procedure, equipment, support staff and site/side marked as required.  Indications: vascular access  Anesthesia: local infiltration    Anesthesia:  Local Anesthetic: lidocaine 1% without epinephrine  Anesthetic total: 5 mL    Sedation:  Patient sedated: yes  Sedatives: see MAR for details  Analgesia: see MAR for details    Preparation: skin prepped with 2% chlorhexidine  Skin prep agent dried: skin prep agent completely dried prior to procedure  Sterile barriers: all five maximum sterile barriers used - cap, mask, sterile gown, sterile gloves, and large sterile sheet  Hand hygiene: hand hygiene performed prior to central venous catheter insertion  Location details: left internal jugular  Patient position: flat  Catheter type: triple lumen  Catheter size: 12 Fr  Pre-procedure: landmarks identified  Ultrasound guidance: yes  Number of attempts: 1  Successful placement: yes  Post-procedure: line sutured and dressing applied  Assessment: blood return through all ports, free fluid flow and placement verified by x-ray  Patient tolerance: patient tolerated the procedure well with no immediate complications  Comments: Dark, nonpulsatile blood was aspirated from LIJ using finder needle under ultrasound.  Wire was threaded via finder needle without difficulty and visualized in LIJ under ultrasound.  Dilator was advanced over wire after small incision was made in the skin.  Dilator was removed.  Second dilator was advanced over wire. Dilator removed. Catheter was advanced " over the wire up to 17 cm.  Wire was removed.  Flushed end caps were placed on all three ports.  All three ports aspirated dark, nonpulsatile blood and flushed back well.  Catheter was sutured in place x 2.  An antibacterial dressing was applied over the site.      Cesilia Osorio, MSN, APRN, ACNPC-AG  Pulmonary and Critical Care Medicine  Electronically signed by MORIAH No, 06/08/24, 12:23 PM EDT.                Full range of motion of upper and lower extremities, no joint tenderness/swelling.

## 2024-06-08 NOTE — PROGRESS NOTES
" LOS: 3 days    Patient Care Team:  Frannie Couch MD as PCP - General    Reason For Visit: Acute renal failure.     Subjective     Intubated and sedated.   Minimal urine output overnight. Will place temporary dialysis catheter initiate hemodialysis.   Hemoglobin 6.9 >> getting blood transfusion.     Hypertriglyceridemia noted ??  Could be due to propofol.     Objective     amLODIPine, 10 mg, Nasogastric, Q24H  aspirin, 325 mg, Nasogastric, Daily  bisoprolol, 2.5 mg, Nasogastric, Daily  budesonide, 0.5 mg, Nebulization, BID - RT  chlorhexidine, 15 mL, Mouth/Throat, Q12H  enoxaparin, 30 mg, Subcutaneous, Daily  famotidine, 20 mg, Intravenous, Daily  insulin regular, 2-9 Units, Subcutaneous, Q6H  ipratropium-albuterol, 3 mL, Nebulization, Q6H - RT  levothyroxine, 100 mcg, Nasogastric, Q AM  polyethylene glycol, 17 g, Nasogastric, Daily  ProSource No Carb, 30 mL, Nasogastric, Daily  sertraline, 200 mg, Nasogastric, Daily  sodium chloride, 10 mL, Intravenous, Q12H      fentanyl 10 mcg/mL,  mcg/hr, Last Rate: 100 mcg/hr (06/08/24 1530)  niCARdipine, 5-15 mg/hr, Last Rate: Stopped (06/07/24 2048)          Vital Signs:  Blood pressure 110/47, pulse 75, temperature 98.6 °F (37 °C), temperature source Bladder, resp. rate 16, height 168.9 cm (66.5\"), weight 79.6 kg (175 lb 7.8 oz), SpO2 95%.    Flowsheet Rows      Flowsheet Row First Filed Value   Admission Height 168.9 cm (66.5\") Documented at 06/05/2024 0740   Admission Weight 65.4 kg (144 lb 1.1 oz) Documented at 06/05/2024 0740            06/07 0701 - 06/08 0700  In: 1936.7 [I.V.:1556.7]  Out: 480 [Urine:480]    Physical Exam:  GENERAL: Intubated and sedated.   HEENT: Normocephalic, atraumatic.    NECK: Supple   HEART: RRR; No murmur, rubs, gallops. Trace edema  LUNGS: Mechanical breath sounds.   ABDOMEN: Soft, nontender, nondistended..  EXT:  Trace edema.  : + Rios   SKIN: Warm and dry without rash  NEURO: Unable to assess  PSYCHIATRIC: Unable to " assess    Labs:  Results from last 7 days   Lab Units 06/08/24  1247 06/08/24  0537 06/08/24  0056   WBC 10*3/mm3 6.74 7.29 6.97   HEMOGLOBIN g/dL 8.2* 6.9* 7.4*   HEMATOCRIT % 22.8* 19.2* 20.2*   PLATELETS 10*3/mm3 74* 79* 80*     Results from last 7 days   Lab Units 06/08/24  1247 06/08/24  0537 06/08/24  0056 06/07/24  1739 06/06/24  0514 06/06/24  0034   SODIUM mmol/L 138 137 138 139   < > 148*   POTASSIUM mmol/L 4.8 4.6 4.5 4.4   < > 3.6   CHLORIDE mmol/L 104 104 105 106   < > 114*   CO2 mmol/L 19.0* 18.0* 20.0* 19.0*   < > 23.0   BUN mg/dL 55* 53* 50* 45*   < > 30*   CREATININE mg/dL 2.94* 4.72* 4.20* 4.04*   < > 2.20*   CALCIUM mg/dL 5.8* 7.1* 6.6* 7.1*   < > 8.0*   MAGNESIUM mg/dL  --   --   --   --   --  1.6    < > = values in this interval not displayed.     Results from last 7 days   Lab Units 06/08/24  1247   GLUCOSE mg/dL 75           Results from last 7 days   Lab Units 06/08/24  0400   PH, ARTERIAL pH units 7.370   PO2 ART mm Hg 55.5*   PCO2, ARTERIAL mm Hg 35.1   HCO3 ART mmol/L 20.3         Estimated Creatinine Clearance: 18.3 mL/min (A) (by C-G formula based on SCr of 2.94 mg/dL (H)).    Chest  Xray:   IMPRESSION:  Impression:  Worsening small right-sided pleural effusion with probable overlying atelectasis. Mild retraction of the endotracheal tube with the tip in the proximal to mid trachea     CT abdomen:   Impression:  1. Left renal artery stent with high-grade stenosis just distal to the stent, possibly from a dissection flap.  2. Large active extravasation from the mid pole left renal cortex with a large pararenal and retroperitoneal hematoma.   3. Left lower lobe atelectasis, which was noted at time of dictation. These findings were being communicated to the OR.  4. Abdominal aortic aneurysm status post endograft repair.    Assessment     Acute renal failure  Abdominal aortic aneurysm repair 6/5  Left renal hemorrhage s/p distal inferior branch embolization  Right atrophic kidney  Acute blood  loss anemia  Hypertension   Dyslipidemia  Coronary artery disease    PLAN:     Acute renal failure on CKD stage III:   - Baseline renal function 1.4-1.5.  Patient follows up with Dr. Brown.  - Right atrophic kidney.  Left renal hemorrhage s/p distal inferior branch embolization 6/5.   - Urine output minimal.  S/p Bumex 2 mg yesterday; no significant urine output. [Cr 4.7; BUN 53].   - Discussed with intensivist.  Will place temporary dialysis catheter initiate hemodialysis.   - Strict ROBERTO's.   - Dose meds to GFR   - Avoid nephrotoxins     Acute blood loss anemia:   -S/p 7 units of PRB; getting another unit today  -Transfuse as needed to keep hemoglobin above 7.      Abdominal aortic aneurysm repair 6/5:   -Vascular following.      Hypertension:   -S/p Cardene drip.  Now off.  Blood pressure control.    High risk and critically ill patient    Serafin Junior MD  06/08/24  18:03 EDT

## 2024-06-08 NOTE — PLAN OF CARE
Goal Outcome Evaluation:  Plan of Care Reviewed With: patient, daughter        Progress: no change     1 unit of PRBCs transfused, repeat H/H 8.2/22.8, minimal UOP, temp dialysis cath placed, HD done for 2 hours, 800 ml UF, afebrile, FiO2 weaned to 40%, continue fentanyl, TF started, family at bedside and updated.

## 2024-06-08 NOTE — PROGRESS NOTES
Intensive Care Follow-up     Hospital:  LOS: 3 days   Ms. Dalila Velez, 74 y.o. 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 Initial surgery was completed without complication however the patient began to become hemodynamically unstable and was found to have a hemoglobin of 6.6 down from 12.9 g preoperatively. She was taken emergently back to the operating room and a leak from the renal artery was repaired. She has received a total of 7 units of packed red blood cells as well as associated plasma and cryo. She is brought to the intensive care unit in an intubated state for recovery.   Interval History:  The chart has been reviewed.  The patient has remained afebrile overnight.  Currently in the process of receiving further units of blood.  Hemoglobin was 6.9 this morning.  Hemodynamics have remained stable.  Increased triglycerides are noted.  I think it is likely that this is related to the propofol and she is comfortable on current therapy.  Abdominal pressures have been in the 6 cm of water range.  Oliguria remains.    The patient's past medical, surgical and social history were reviewed and updated in Epic as appropriate.        Objective     Infusions:  fentanyl 10 mcg/mL,  mcg/hr, Last Rate: 125 mcg/hr (06/08/24 1205)  niCARdipine, 5-15 mg/hr, Last Rate: Stopped (06/07/24 2048)      Medications:  amLODIPine, 10 mg, Nasogastric, Q24H  aspirin, 325 mg, Nasogastric, Daily  bisoprolol, 2.5 mg, Nasogastric, Daily  budesonide, 0.5 mg, Nebulization, BID - RT  chlorhexidine, 15 mL, Mouth/Throat, Q12H  enoxaparin, 30 mg,  "Subcutaneous, Daily  famotidine, 20 mg, Intravenous, Daily  insulin regular, 2-9 Units, Subcutaneous, Q6H  ipratropium-albuterol, 3 mL, Nebulization, Q6H - RT  levothyroxine, 100 mcg, Nasogastric, Q AM  polyethylene glycol, 17 g, Nasogastric, Daily  sertraline, 200 mg, Nasogastric, Daily  sodium chloride, 10 mL, Intravenous, Q12H        Vital Sign Min/Max for last 24 hours  Temp  Min: 98.1 °F (36.7 °C)  Max: 98.6 °F (37 °C)   BP  Min: 100/37  Max: 143/61   Pulse  Min: 68  Max: 92   Resp  Min: 16  Max: 19   SpO2  Min: 88 %  Max: 100 %   No data recorded       Input/Output for last 24 hour shift  06/07 0701 - 06/08 0700  In: 1936.7 [I.V.:1556.7]  Out: 480 [Urine:480]   Mode: VC+/AC  FiO2 (%):  [40 %-100 %] 55 %  S RR:  [16] 16  S VT:  [410 mL] 410 mL  PEEP/CPAP (cm H2O):  [10 cm H20-12 cm H20] 10 cm H20  MAP (cm H2O):  [15-20] 16  Objective:  General Appearance:  Ill-appearing, in no acute distress and uncomfortable (Sedated on the ventilator.).    Vital signs: (most recent): Blood pressure 121/63, pulse 75, temperature 98.1 °F (36.7 °C), temperature source Bladder, resp. rate 16, height 168.9 cm (66.5\"), weight 79.6 kg (175 lb 7.8 oz), SpO2 97%.    HEENT: (Endotracheal tube in place.)    Lungs:  Normal effort and normal respiratory rate.  There are rales and decreased breath sounds.  No rhonchi.    Heart: Normal rate.  Regular rhythm.  S1 normal and S2 normal.    Abdomen: (Abdomen distended and tight.  ).  Hypoactive bowel sounds.     Extremities: There is dependent edema.  There is no deformity.    Neurological: (Currently sedated).    Pupils:  Pupils are equal, round, and reactive to light.  Pupils are equal. (Scleral edema).    Skin:  Warm and pale.                Results from last 7 days   Lab Units 06/08/24  0537 06/08/24  0056 06/07/24  1739   WBC 10*3/mm3 7.29 6.97 8.07   HEMOGLOBIN g/dL 6.9* 7.4* 7.7*   PLATELETS 10*3/mm3 79* 80* 80*     Results from last 7 days   Lab Units 06/08/24  0537 06/08/24  0056 " 06/07/24  1739 06/06/24  0514 06/06/24  0034   SODIUM mmol/L 137 138 139   < > 148*   POTASSIUM mmol/L 4.6 4.5 4.4   < > 3.6   CO2 mmol/L 18.0* 20.0* 19.0*   < > 23.0   BUN mg/dL 53* 50* 45*   < > 30*   CREATININE mg/dL 4.72* 4.20* 4.04*   < > 2.20*   MAGNESIUM mg/dL  --   --   --   --  1.6   GLUCOSE mg/dL 92 97 96   < > 176*    < > = values in this interval not displayed.     Estimated Creatinine Clearance: 11.4 mL/min (A) (by C-G formula based on SCr of 4.72 mg/dL (H)).    Results from last 7 days   Lab Units 06/08/24  0400   PH, ARTERIAL pH units 7.370   PCO2, ARTERIAL mm Hg 35.1   PO2 ART mm Hg 55.5*         I reviewed the patient's results and images.     Assessment & Plan   Impression        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    Renal artery stenosis    Hypertension    Hypothyroidism    Acute blood loss anemia    Acute kidney injury       Plan        There will likely be renal replacement therapy initiation today.  Maintain sedation as the patient is not yet ready for weaning from the ventilator.  Hold Lovenox for now and place SCDs.  As needed abdominal pressures.  Patient likely will require nutrition within the next 12 to 24 hours.  Her bowels seem to be working and if okay with vascular surgery we can initiate enteral feedings.  If not, TPN should be considered.  This patient remains at very high risk of worsening secondary to multiple organs currently in failure.    Plan of care and goals reviewed with mulitdisciplinary/antibiotic stewardship team during rounds.   I discussed the patient's findings and my recommendations with nursing staff and consulting provider     High level of risk due to:  drug(s) requiring intensive monitoring for toxicity and parenteral controlled substances.        Saúl Cao MD, [facility]  Pulmonology and Critical Care Medicine

## 2024-06-08 NOTE — PLAN OF CARE
Goal Outcome Evaluation: Initial HD completed. Pt was hypotensive during last 30 mins. UF minimize . Goal was not met. Blood reinfused to pt. Bedside report to DEBBIE Wasserman    Problem: Device-Related Complication Risk (Hemodialysis)  Goal: Safe, Effective Therapy Delivery  Outcome: Ongoing, Progressing  Intervention: Optimize Device Care and Function  Recent Flowsheet Documentation  Taken 6/8/2024 3792 by Emmie Sainz RN  Medication Review/Management: medications reviewed     Problem: Hemodynamic Instability (Hemodialysis)  Goal: Effective Tissue Perfusion  Outcome: Ongoing, Progressing     Problem: Infection (Hemodialysis)  Goal: Absence of Infection Signs and Symptoms  Outcome: Ongoing, Progressing

## 2024-06-08 NOTE — PROGRESS NOTES
LOS: 3 days   Patient Care Team:  Frannie Couch MD as PCP - General      Subjective       Subjective  POD#2 s/p EVAR, followed by emergent left renal artery embolization with stent placement for hemorrhage (6/5/24, Eric). Patient remains intubated and sedated.   Hemoglobin at 8 from 6.9 status posttransfusion.  Patient currently undergoing CRRT secondary to ongoing renal failure.    History taken from: chart RN    Objective     Vital Signs  Temp:  [98.1 °F (36.7 °C)-98.6 °F (37 °C)] 98.1 °F (36.7 °C)  Heart Rate:  [68-92] 72  Resp:  [16-19] 16  BP: (103-143)/(41-69) 129/56  FiO2 (%):  [40 %-100 %] 55 %    Physical Exam:  Present at bedside: nursing and daughter  General: no acute distress, intubated and sedated   Respiratory: ETT tube secured, oral gastric tube in place  Abdomen: tight and distended  Bilateral Groins: access sites c/d/I, no surrounding ecchymosis, no evidence of hematoma  Extremities: warm and pink without edema  Vascular: palpable RIGHT DP pulse, audible LEFT DP signal      Results Review:     I reviewed the patient's new clinical results.  CBC    Results from last 7 days   Lab Units 06/08/24  1247 06/08/24  0537 06/08/24  0056 06/07/24  1739 06/07/24  1232 06/07/24  0502 06/07/24  0038   WBC 10*3/mm3 6.74 7.29 6.97 8.07 10.83* 10.76 8.31   HEMOGLOBIN g/dL 8.2* 6.9* 7.4* 7.7* 8.1* 7.9* 8.0*   PLATELETS 10*3/mm3 74* 79* 80* 80* 89* 82* 86*     BMP   Results from last 7 days   Lab Units 06/08/24  1247 06/08/24  0537 06/08/24  0056 06/07/24  1739 06/07/24  1232 06/07/24  0502 06/07/24  0038 06/06/24  0514 06/06/24  0034   SODIUM mmol/L 138 137 138 139 140 143 143   < > 148*   POTASSIUM mmol/L 4.8 4.6 4.5 4.4 4.6 4.4 4.0   < > 3.6   CHLORIDE mmol/L 104 104 105 106 106 110* 107   < > 114*   CO2 mmol/L 19.0* 18.0* 20.0* 19.0* 21.0* 21.0* 20.0*   < > 23.0   BUN mg/dL 55* 53* 50* 45* 42* 39* 36*   < > 30*   CREATININE mg/dL 2.94* 4.72* 4.20* 4.04* 3.48* 3.70* 3.32*   < > 2.20*   GLUCOSE mg/dL 75 92  97 96 114* 108* 106*   < > 176*   MAGNESIUM mg/dL  --   --   --   --   --   --   --   --  1.6    < > = values in this interval not displayed.     CMP   Results from last 7 days   Lab Units 06/08/24  1247 06/08/24  0537 06/08/24  0056 06/07/24  1739 06/07/24  1232 06/07/24  0502 06/07/24  0038   SODIUM mmol/L 138 137 138 139 140 143 143   POTASSIUM mmol/L 4.8 4.6 4.5 4.4 4.6 4.4 4.0   CHLORIDE mmol/L 104 104 105 106 106 110* 107   CO2 mmol/L 19.0* 18.0* 20.0* 19.0* 21.0* 21.0* 20.0*   BUN mg/dL 55* 53* 50* 45* 42* 39* 36*   CREATININE mg/dL 2.94* 4.72* 4.20* 4.04* 3.48* 3.70* 3.32*   GLUCOSE mg/dL 75 92 97 96 114* 108* 106*     ABG    Results from last 7 days   Lab Units 06/08/24  0400 06/07/24  0146 06/06/24  2004 06/06/24  0330 06/05/24  2152 06/05/24  1738 06/05/24  1538   PH, ARTERIAL pH units 7.370 7.387 7.435 7.466* 7.404 7.323* 7.31*   PCO2, ARTERIAL mm Hg 35.1 35.6 32.0* 35.8 32.1* 35.4  --    PO2 ART mm Hg 55.5* 66.0* 48.9* 72.5* 68.9* 298.0*  --    BASE EXCESS ART mmol/L -4.6* -3.3* -2.4* 2.1* -3.9* -7.0*  --      UA           Current Facility-Administered Medications:     amLODIPine (NORVASC) tablet 10 mg, 10 mg, Nasogastric, Q24H, Saúl Cao MD, 10 mg at 06/08/24 0849    aspirin tablet 325 mg, 325 mg, Nasogastric, Daily, Saúl Cao MD, 325 mg at 06/08/24 0849    bisoprolol (ZEBeta) tablet 2.5 mg, 2.5 mg, Nasogastric, Daily, Saúl Cao MD, 2.5 mg at 06/08/24 0849    budesonide (PULMICORT) nebulizer solution 0.5 mg, 0.5 mg, Nebulization, BID - RT, Karan Munroe APRN, 0.5 mg at 06/08/24 0745    Calcium Replacement - Follow Nurse / BPA Driven Protocol, , Does not apply, PRN, Michell Ugarte MD    chlorhexidine (PERIDEX) 0.12 % solution 15 mL, 15 mL, Mouth/Throat, Q12H, Michell Ugarte MD, 15 mL at 06/08/24 0848    dextrose (D50W) (25 g/50 mL) IV injection 10-50 mL, 10-50 mL, Intravenous, Q15 Min PRN, Karan Munroe APRN    diazePAM (VALIUM) tablet 5 mg, 5  mg, Nasogastric, Daily PRN, Saúl Cao MD    Enoxaparin Sodium (LOVENOX) syringe 30 mg, 30 mg, Subcutaneous, Daily, Theodore Reyes MD, 30 mg at 06/07/24 0820    famotidine (PEPCID) injection 20 mg, 20 mg, Intravenous, Daily, Michell Ugarte MD, 20 mg at 06/08/24 0849    fentaNYL (SUBLIMAZE) bolus from bag 10 mcg/mL injection 50 mcg, 50 mcg, Intravenous, Q30 Min PRN, Scott Perkins MD    fentaNYL 2500 mcg/250 mL NS infusion,  mcg/hr, Intravenous, Titrated, Scott Perkins MD, Last Rate: 12.5 mL/hr at 06/08/24 1205, 125 mcg/hr at 06/08/24 1205    glucagon (GLUCAGEN) injection 1 mg, 1 mg, Intramuscular, Q15 Min PRN, Karan Munroe APRN    HYDROcodone-acetaminophen (NORCO) 5-325 MG per tablet 1 tablet, 1 tablet, Nasogastric, Q4H PRN, Saúl Cao MD, 1 tablet at 06/08/24 0424    insulin regular (humuLIN R,novoLIN R) injection 2-9 Units, 2-9 Units, Subcutaneous, Q6H, Karan Munroe APRN, 2 Units at 06/06/24 1738    ipratropium-albuterol (DUO-NEB) nebulizer solution 3 mL, 3 mL, Nebulization, Q4H PRN, Yesenia Osorio APRN, 3 mL at 06/06/24 1656    ipratropium-albuterol (DUO-NEB) nebulizer solution 3 mL, 3 mL, Nebulization, Q6H - RT, Karan Munroe APRN, 3 mL at 06/08/24 1354    levothyroxine (SYNTHROID, LEVOTHROID) tablet 100 mcg, 100 mcg, Nasogastric, Q AM, Saúl Cao MD, 100 mcg at 06/08/24 0538    Magnesium Low Dose Replacement - Follow Nurse / BPA Driven Protocol, , Does not apply, PRN, Michell Ugarte MD    morphine injection 1 mg, 1 mg, Intravenous, Q4H PRN **AND** naloxone (NARCAN) injection 0.4 mg, 0.4 mg, Intravenous, Q5 Min PRN, Saúl Cao MD    niCARdipine (CARDENE) 50 mg in sodium chloride 0.9 % 250 mL IVPB, 5-15 mg/hr, Intravenous, Titrated, Karan Munroe, APRN, Stopped at 06/07/24 2048    nitroglycerin (NITROSTAT) SL tablet 0.4 mg, 0.4 mg, Sublingual, Q5 Min PRN, Theodore Reyes MD    ondansetron (ZOFRAN) injection 4 mg, 4 mg,  Intravenous, Q6H PRN, Michell Ugarte MD    Phosphorus Replacement - Follow Nurse / BPA Driven Protocol, , Does not apply, PRN, Michell Ugarte MD    polyethylene glycol (MIRALAX) packet 17 g, 17 g, Nasogastric, Daily, Saúl Cao MD    polyvinyl alcohol (LIQUIFILM) 1.4 % ophthalmic solution 2 drop, 2 drop, Both Eyes, Q1H PRN, Michell Ugarte MD    Potassium Replacement - Follow Nurse / BPA Driven Protocol, , Does not apply, PRNTorito Donna C, MD    sertraline (ZOLOFT) tablet 200 mg, 200 mg, Nasogastric, Daily, Saúl Cao MD, 200 mg at 06/08/24 0848    sodium chloride 0.9 % flush 10 mL, 10 mL, Intravenous, Q12H, Michell Ugarte MD, 10 mL at 06/08/24 0849    sodium chloride 0.9 % flush 10 mL, 10 mL, Intravenous, PRN, Michell Ugarte MD    sodium chloride 0.9 % infusion 40 mL, 40 mL, Intravenous, PRN, Michell Ugarte MD     Assessment & Plan   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  Large-bore bilateral femoral sheath placement for endovascular graft deployment.  Right 22 Spanish and left 12 Spanish  3.  Bifurcated endovascular repair of aortic aneurysm extending into the right external iliac artery and left common iliac artery  - 6/5/24 (Eric):  1.  Left renal distal inferior artery embolization for hemorrhage  2.  Graftmaster covered stent placement for distal renal artery thrombus versus dissection    Patient continues to have worsening renal function CRRT started today.  Patient remains intubated and sedated.  Last hemoglobin  8.  White count is normalized patient having bowel function okay to start enteral feeds abdomen is soft no signs of abdominal compartment syndrome.  Vascular surgery appreciates the aid in the management of this critically ill patient    Junior Sharon MD  06/08/24  14:19 EDT

## 2024-06-08 NOTE — PROGRESS NOTES
Nutrition Services    Patient Name:  Dalila Velez  YOB: 1950  MRN: [id number]  Admit Date:  6/5/2024    RN communicated MD wanting to start EN.   W pt off propofol, order placed per RD rec 6/7:  Peptamen 1.5 begin 15 ml/hr advance w tolerance by 15 ml/hr ev 6 hr to goal 45 ml/hr Water at 20 ml/hr Give one Prosource/da   to supply 900 ml for 1410 Kcal 99% est need, 76 g PRO 97% est need, 0 Fiber 693 ml Water in EN 1093 total ml Free Water.     Electronically signed by:  Varsha Pettit RD  06/08/24 19:57 EDT

## 2024-06-08 NOTE — PROGRESS NOTES
"                  Clinical Nutrition     Patient Name: Dalila Velez  YOB: 1950  MRN: [id number]  Date of Encounter: 06/07/24 20:02 EDT  Admission date: 6/5/2024  Reason for Visit: NPO/Clear liquid > 72 hr    Assessment   Nutrition Assessment   Admission Diagnosis:  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 [I71.42]    Problem List:    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    Renal artery stenosis    Hypertension    Hypothyroidism    Acute blood loss anemia    Acute kidney injury      PMH:   She  has a past medical history of Anxiety, Carotid artery disease, Coronary artery disease, Depression, Dyslipidemia, Eczema, GERD (gastroesophageal reflux disease), Hypertension, Hypothyroidism, Peptic ulcer disease, and Renal artery stenosis.    PSH:  She  has a past surgical history that includes Cholecystectomy; Coronary angioplasty; Renal artery stent (Bilateral, 2012); Carotid endarterectomy (Left, 2006); Total hip arthroplasty (Left, 06/2017); Cataract extraction, bilateral; Hysterectomy; Cervical spine surgery; Colonoscopy; AAA repair, open (N/A, 6/5/2024); and AAA repair, open (N/A, 6/5/2024).    Applicable Nutrition History:       Labs    Labs Reviewed: Yes    Results from last 7 days   Lab Units 06/07/24  1739 06/07/24  1232 06/07/24  0502 06/06/24  0514 06/06/24  0034   GLUCOSE mg/dL 96 114* 108*   < > 176*   BUN mg/dL 45* 42* 39*   < > 30*   CREATININE mg/dL 4.04* 3.48* 3.70*   < > 2.20*   SODIUM mmol/L 139 140 143   < > 148*   CHLORIDE mmol/L 106 106 110*   < > 114*   POTASSIUM mmol/L 4.4 4.6 4.4   < > 3.6   MAGNESIUM mg/dL  --   --   --   --  1.6   LACTATE mmol/L 1.0 1.8 1.3   < > 5.0*    < > = values in this interval not displayed.             Invalid input(s): \"PLAT\"    Results from last 7 days   Lab Units 06/07/24  1751 06/07/24  1134 06/07/24  0554 06/06/24  2336 06/06/24  1738 06/06/24  1142   GLUCOSE mg/dL 115 137* 119 126 159* 146*     Lab Results   Lab " "Value Date/Time    HGBA1C 5.50 05/29/2024 1511               Medications    Medications Reviewed: yes  Aspirin, Pepcid, Insulin, Synthroid, Miralax,  Cardene, Propofol 40 = 512 Kcal/da  Intake/Ouptut 24 hrs (0701 - 0700)   I&O's Reviewed: yes  Intake & Output (last day)         06/07 0701 06/08 0700    I.V. (mL/kg) 1556.7 (19.2)    Other 60    NG/    IV Piggyback     Total Intake(mL/kg) 1756.7 (21.7)    Urine (mL/kg/hr) 240 (0.2)    Stool 0    Total Output 240    Net +1516.7         Stool Unmeasured Occurrence 2 x              Anthropometrics     Height: Height: 168.9 cm (66.5\")  Last Filed Weight: Weight: 80.9 kg (178 lb 5.6 oz) (06/07/24 0600)  Method: Weight Method: Bed scale  BMI: BMI (Calculated): 28.4    UBW: Per EMR wt of 150 lbs on 11/23/22, 148 lbs on 1/24/24 Standing Scale wt of 144 lbs on 5/9/24   Weight change: ? Accuracy of current bed wt. Non significant change per prior hx    Nutrition Focused Physical Exam     Date: 6/7    Unable to perform due to Pt unable to participate at time of visit     Subjective   Reported/Observed/Food/Nutrition Related History:     6/7  Pt NPO > 72 hr.    Needs Assessment   Date: 6/7    Height used:Height: 168.9 cm (66.5\")  Weights used: standing scale wt 65.35 Kg on 5/29     Estimated Calorie needs: ~ 1425 Kcal/day  Method:  Kcals/KG20-25 = 0877-1132 avg 1470  Method:  [id number] x 1.2 = 1426  Method:  PSUon Temp 36.6 Ve 7.85 = 1439    Estimated Protein needs: ~ 78 g PRO/day  Method: g/Kg 1.2    Estimated Fluid needs: ~ ml/day   Per clinical status    Current Nutrition Prescription     PO: NPO Diet NPO Type: Other (see comments)  Oral Nutrition Supplement:  Intake: N/A    Assessment & Plan   Nutrition Diagnosis   Date: 6/7 Updated:   Problem Inadequate oral intake    Etiology Mechanical ventilation   Signs/Symptoms NPO   Status: EN rec prepared      Goal:   Nutrition to support treatment and Initiate EN if/as appropriate      Nutrition Intervention      Follow " treatment progress, Care plan reviewed, EN rec prepared    At current propofol rx of 40 mcg/kg/min supplying 512 Kcal/da  Peptamen VHP begin 20  ml/hr advance w tolerance by 10 ml/hr ev 6 hr to goal 40 ml/hr Water at 15 ml/hrto supply 800 ml for   800 Kcal 56% est need 74 g PRO 95% est need, 3.2 g Fiber 672 ml Water in  total ml Free Water    OFF propofol:  Peptamen 1.5 begin 15 ml/hr advance w tolerance by 15 ml/hr ev 6 hr to goal 45 ml/hr Water at 20 ml/hr Give one Prosource/da to supply 900 ml for 1410 Kcal 99% est need, 76 g PRO 97% est need, 0 Fiber 693 ml Water in EN 1093 total ml Free Water.    Monitoring/Evaluation:   Per protocol, I&O, Pertinent labs, Weight, Symptoms, POC/GOC    Varsha Pettit RD  Time Spent: 30 min

## 2024-06-08 NOTE — SIGNIFICANT NOTE
APRN Cross-Cover Note     Severe hypertriglyceridemia noted on AM labs. Propofol stopped / switched to Fentanyl for sedation. CK pending. Repeat triglycerides to be drawn again at 1200. Will relay to day team.     Rola Weldon DNP, APRN, AGACNP-BC  Pulmonary and Critical Care Medicine

## 2024-06-09 NOTE — PROGRESS NOTES
" LOS: 4 days    Patient Care Team:  Frannie Couch MD as PCP - General    Reason For Visit: Acute renal failure.     Subjective     Intubated, sedated.   No major overnight issues per nurse.  Minimal urine output.   S/p hemodialysis yesterday.   Will give a dose of Bumex today and a bicarb push. [Hemodialysis tomorrow]     Hypertriglyceridemia noted ??  Could be due to propofol.     Objective     amLODIPine, 10 mg, Nasogastric, Q24H  aspirin, 325 mg, Nasogastric, Daily  bisoprolol, 2.5 mg, Nasogastric, Daily  budesonide, 0.5 mg, Nebulization, BID - RT  chlorhexidine, 15 mL, Mouth/Throat, Q12H  [Held by provider] enoxaparin, 30 mg, Subcutaneous, Daily  famotidine, 20 mg, Intravenous, Daily  insulin regular, 2-9 Units, Subcutaneous, Q6H  ipratropium-albuterol, 3 mL, Nebulization, Q6H - RT  levothyroxine, 100 mcg, Nasogastric, Q AM  polyethylene glycol, 17 g, Nasogastric, Daily  ProSource No Carb, 30 mL, Nasogastric, Daily  sertraline, 200 mg, Nasogastric, Daily  sodium chloride, 10 mL, Intravenous, Q12H      fentanyl 10 mcg/mL,  mcg/hr, Last Rate: 100 mcg/hr (06/09/24 0437)  niCARdipine, 5-15 mg/hr, Last Rate: Stopped (06/07/24 2048)          Vital Signs:  Blood pressure 118/49, pulse 74, temperature 98.4 °F (36.9 °C), temperature source Bladder, resp. rate 16, height 168.9 cm (66.5\"), weight 81.4 kg (179 lb 7.3 oz), SpO2 95%.    Flowsheet Rows      Flowsheet Row First Filed Value   Admission Height 168.9 cm (66.5\") Documented at 06/05/2024 0740   Admission Weight 65.4 kg (144 lb 1.1 oz) Documented at 06/05/2024 0740            06/08 0701 - 06/09 0700  In: 1326.5 [I.V.:542.5]  Out: 1110 [Urine:310]    Physical Exam:  GENERAL: Intubated and sedated.   HEENT: Normocephalic, atraumatic.    NECK: Supple   HEART: RRR; No murmur, rubs, gallops. Trace edema  LUNGS: Mechanical breath sounds.   ABDOMEN: Soft, nontender, nondistended..  EXT:  Trace edema.  : + Rios   SKIN: Warm and dry without rash  NEURO: Unable to " assess  PSYCHIATRIC: Unable to assess    Labs:  Results from last 7 days   Lab Units 06/09/24  1207 06/09/24  0432 06/08/24  2359   WBC 10*3/mm3 6.76 6.49 6.59   HEMOGLOBIN g/dL 7.4* 7.8* 7.9*   HEMATOCRIT % 21.8* 22.4* 22.8*   PLATELETS 10*3/mm3 84* 81* 79*     Results from last 7 days   Lab Units 06/09/24  1207 06/09/24  0432 06/08/24  2359 06/08/24  1809 06/06/24  0514 06/06/24  0034   SODIUM mmol/L 136 133* 136 138   < > 148*   POTASSIUM mmol/L 5.2 5.1 4.9 4.7   < > 3.6   CHLORIDE mmol/L 102 100 103 105   < > 114*   CO2 mmol/L 18.0* 16.0* 17.0* 18.0*   < > 23.0   BUN mg/dL 57* 53* 47* 42*   < > 30*   CREATININE mg/dL 4.89* 4.34* 4.29* 4.10*   < > 2.20*   CALCIUM mg/dL 7.6* 7.3* 7.9* 7.3*   < > 8.0*   MAGNESIUM mg/dL  --  1.9  --   --   --  1.6    < > = values in this interval not displayed.     Results from last 7 days   Lab Units 06/09/24  1207   GLUCOSE mg/dL 76           Results from last 7 days   Lab Units 06/09/24  0356   PH, ARTERIAL pH units 7.284*   PO2 ART mm Hg 65.6*   PCO2, ARTERIAL mm Hg 37.5   HCO3 ART mmol/L 17.8*         Estimated Creatinine Clearance: 11.1 mL/min (A) (by C-G formula based on SCr of 4.89 mg/dL (H)).    Chest  Xray:   IMPRESSION:  Impression:  Worsening small right-sided pleural effusion with probable overlying atelectasis. Mild retraction of the endotracheal tube with the tip in the proximal to mid trachea     CT abdomen:   Impression:  1. Left renal artery stent with high-grade stenosis just distal to the stent, possibly from a dissection flap.  2. Large active extravasation from the mid pole left renal cortex with a large pararenal and retroperitoneal hematoma.   3. Left lower lobe atelectasis, which was noted at time of dictation. These findings were being communicated to the OR.  4. Abdominal aortic aneurysm status post endograft repair.    Assessment     Acute renal failure  Abdominal aortic aneurysm repair 6/5  Left renal hemorrhage s/p distal inferior branch  embolization  Right atrophic kidney  Acute blood loss anemia  Hypertension   Dyslipidemia  Coronary artery disease    PLAN:     Acute renal failure on CKD stage III:   - Baseline renal function 1.4-1.5.  Patient follows up with Dr. Brown.  - Right atrophic kidney.  Left renal hemorrhage s/p distal inferior branch embolization 6/5.   - Urine output minimal.   Hemodialysis initiated on 6/8.   -Will give a dose of Bumex 2 mg today.   - Strict ROBERTO's.   - Dose meds to GFR   - Avoid nephrotoxins     Metabolic acidosis:   -Will give bicarb push.  Managed with dialysis.     Acute blood loss anemia:   -S/p 7 units of PRBC and 1 units yesterday.   -Transfuse as needed to keep hemoglobin above 7.      Abdominal aortic aneurysm repair 6/5:   -Vascular following.      Hypertension:   -S/p Cardene drip.  Now off.  Blood pressure control.    High risk and critically ill patient    Serafin Junior MD  06/09/24  13:30 EDT

## 2024-06-09 NOTE — PROGRESS NOTES
LOS: 4 days   Patient Care Team:  Frannie Couch MD as PCP - General      Subjective       Subjective  POD# 4 s/p EVAR, followed by emergent left renal artery embolization with stent placement for hemorrhage (6/5/24, Eric). Patient remains intubated and sedated.   Hemoglobin at 7.9.  Patient currently underwent CRRT yesterday, continues to make minimal urine output.  Patient having bowel movements currently tolerating tube feeds.  History taken from: chart RN    Objective     Vital Signs  Temp:  [98.1 °F (36.7 °C)-99.3 °F (37.4 °C)] 99.3 °F (37.4 °C)  Heart Rate:  [70-90] 76  Resp:  [16] 16  BP: (101-139)/(44-69) 127/53  FiO2 (%):  [40 %-55 %] 55 %    Physical Exam:  Present at bedside: nursing and daughter  General: no acute distress, intubated and sedated   Respiratory: ETT tube secured, oral gastric tube in place  Abdomen: tight and distended  Bilateral Groins: access sites c/d/I, no surrounding ecchymosis, no evidence of hematoma  Extremities: warm and pink without edema  Vascular: palpable RIGHT DP pulse, audible LEFT DP signal      Results Review:     I reviewed the patient's new clinical results.  CBC    Results from last 7 days   Lab Units 06/09/24  0432 06/08/24  2359 06/08/24  1809 06/08/24  1247 06/08/24  0537 06/08/24  0056 06/07/24  1739   WBC 10*3/mm3 6.49 6.59 6.59 6.74 7.29 6.97 8.07   HEMOGLOBIN g/dL 7.8* 7.9* 8.2* 8.2* 6.9* 7.4* 7.7*   PLATELETS 10*3/mm3 81* 79* 80* 74* 79* 80* 80*     BMP   Results from last 7 days   Lab Units 06/09/24  0432 06/08/24  2359 06/08/24  1809 06/08/24  1247 06/08/24  0537 06/08/24  0056 06/07/24  1739 06/06/24  0514 06/06/24  0034   SODIUM mmol/L 133* 136 138 138 137 138 139   < > 148*   POTASSIUM mmol/L 5.1 4.9 4.7 4.8 4.6 4.5 4.4   < > 3.6   CHLORIDE mmol/L 100 103 105 104 104 105 106   < > 114*   CO2 mmol/L 16.0* 17.0* 18.0* 19.0* 18.0* 20.0* 19.0*   < > 23.0   BUN mg/dL 53* 47* 42* 55* 53* 50* 45*   < > 30*   CREATININE mg/dL 4.34* 4.29* 4.10* 2.94* 4.72*  4.20* 4.04*   < > 2.20*   GLUCOSE mg/dL 65 63* 64* 75 92 97 96   < > 176*   MAGNESIUM mg/dL  --   --   --   --   --   --   --   --  1.6    < > = values in this interval not displayed.     CMP   Results from last 7 days   Lab Units 06/09/24  0432 06/08/24  2359 06/08/24  1809 06/08/24  1247 06/08/24  0537 06/08/24  0056 06/07/24  1739   SODIUM mmol/L 133* 136 138 138 137 138 139   POTASSIUM mmol/L 5.1 4.9 4.7 4.8 4.6 4.5 4.4   CHLORIDE mmol/L 100 103 105 104 104 105 106   CO2 mmol/L 16.0* 17.0* 18.0* 19.0* 18.0* 20.0* 19.0*   BUN mg/dL 53* 47* 42* 55* 53* 50* 45*   CREATININE mg/dL 4.34* 4.29* 4.10* 2.94* 4.72* 4.20* 4.04*   GLUCOSE mg/dL 65 63* 64* 75 92 97 96     ABG    Results from last 7 days   Lab Units 06/09/24  0356 06/08/24  0400 06/07/24  0146 06/06/24 2004 06/06/24  0330 06/05/24  2152 06/05/24  1738   PH, ARTERIAL pH units 7.284* 7.370 7.387 7.435 7.466* 7.404 7.323*   PCO2, ARTERIAL mm Hg 37.5 35.1 35.6 32.0* 35.8 32.1* 35.4   PO2 ART mm Hg 65.6* 55.5* 66.0* 48.9* 72.5* 68.9* 298.0*   BASE EXCESS ART mmol/L -8.2* -4.6* -3.3* -2.4* 2.1* -3.9* -7.0*     UA           Current Facility-Administered Medications:     amLODIPine (NORVASC) tablet 10 mg, 10 mg, Nasogastric, Q24H, Saúl Cao MD, 10 mg at 06/08/24 0849    aspirin tablet 325 mg, 325 mg, Nasogastric, Daily, Saúl Cao MD, 325 mg at 06/09/24 0903    bisoprolol (ZEBeta) tablet 2.5 mg, 2.5 mg, Nasogastric, Daily, Saúl Cao MD, 2.5 mg at 06/08/24 0849    budesonide (PULMICORT) nebulizer solution 0.5 mg, 0.5 mg, Nebulization, BID - RT, Karan Munroe, MORIAH, 0.5 mg at 06/09/24 0706    Calcium Replacement - Follow Nurse / BPA Driven Protocol, , Does not apply, PRN, Michell Ugarte MD    chlorhexidine (PERIDEX) 0.12 % solution 15 mL, 15 mL, Mouth/Throat, Q12H, Michell Ugarte MD, 15 mL at 06/09/24 0903    dextrose (D50W) (25 g/50 mL) IV injection 10-50 mL, 10-50 mL, Intravenous, Q15 Min PRN, Karan Munroe,  APRN    diazePAM (VALIUM) tablet 5 mg, 5 mg, Nasogastric, Daily PRN, Saúl Cao MD    [Held by provider] Enoxaparin Sodium (LOVENOX) syringe 30 mg, 30 mg, Subcutaneous, Daily, Theodore Reyes MD, 30 mg at 06/07/24 0820    famotidine (PEPCID) injection 20 mg, 20 mg, Intravenous, Daily, Michell Ugarte MD, 20 mg at 06/09/24 0903    fentaNYL (SUBLIMAZE) bolus from bag 10 mcg/mL injection 50 mcg, 50 mcg, Intravenous, Q30 Min PRN, Scott Perkins MD    fentaNYL 2500 mcg/250 mL NS infusion,  mcg/hr, Intravenous, Titrated, Scott Perkins MD, Last Rate: 10 mL/hr at 06/09/24 0437, 100 mcg/hr at 06/09/24 0437    glucagon (GLUCAGEN) injection 1 mg, 1 mg, Intramuscular, Q15 Min PRN, Karan Munroe APRN    heparin (porcine) injection 1,000 Units, 1,000 Units, Intracatheter, PRN, Serafin Junior MD, 1,000 Units at 06/08/24 1545    HYDROcodone-acetaminophen (NORCO) 5-325 MG per tablet 1 tablet, 1 tablet, Nasogastric, Q4H PRN, Saúl Cao MD, 1 tablet at 06/08/24 0424    insulin regular (humuLIN R,novoLIN R) injection 2-9 Units, 2-9 Units, Subcutaneous, Q6H, Karan Munroe APRN, 2 Units at 06/06/24 1738    ipratropium-albuterol (DUO-NEB) nebulizer solution 3 mL, 3 mL, Nebulization, Q4H PRN, Yesenia Osorio APRN, 3 mL at 06/06/24 1656    ipratropium-albuterol (DUO-NEB) nebulizer solution 3 mL, 3 mL, Nebulization, Q6H - RT, Karan Munroe APRN, 3 mL at 06/09/24 0706    levothyroxine (SYNTHROID, LEVOTHROID) tablet 100 mcg, 100 mcg, Nasogastric, Q AM, Saúl Cao MD, 100 mcg at 06/09/24 0536    Magnesium Low Dose Replacement - Follow Nurse / BPA Driven Protocol, , Does not apply, PRN, Michell Ugarte MD    morphine injection 1 mg, 1 mg, Intravenous, Q4H PRN **AND** naloxone (NARCAN) injection 0.4 mg, 0.4 mg, Intravenous, Q5 Min PRN, Saúl Cao MD    niCARdipine (CARDENE) 50 mg in sodium chloride 0.9 % 250 mL IVPB, 5-15 mg/hr, Intravenous, Titrated, Ludwig  Karan BAPTISTE APRN, Stopped at 06/07/24 2048    nitroglycerin (NITROSTAT) SL tablet 0.4 mg, 0.4 mg, Sublingual, Q5 Min PRN, Theodore Reyes MD    ondansetron (ZOFRAN) injection 4 mg, 4 mg, Intravenous, Q6H PRN, Michell Ugarte MD    Phosphorus Replacement - Follow Nurse / BPA Driven Protocol, , Does not apply, PRN, Michell Ugarte MD    polyethylene glycol (MIRALAX) packet 17 g, 17 g, Nasogastric, Daily, Saúl Cao MD    polyvinyl alcohol (LIQUIFILM) 1.4 % ophthalmic solution 2 drop, 2 drop, Both Eyes, Q1H PRN, Michell Ugarte MD    Potassium Replacement - Follow Nurse / BPA Driven Protocol, , Does not apply, PRN, Michell Ugarte MD    ProSource No Carb oral solution 30 mL, 30 mL, Nasogastric, Daily, Varsha Pettit, RADHA, 30 mL at 06/09/24 0903    sertraline (ZOLOFT) tablet 200 mg, 200 mg, Nasogastric, Daily, Saúl Cao MD, 200 mg at 06/09/24 0903    sodium chloride 0.9 % flush 10 mL, 10 mL, Intravenous, Q12H, Michell Ugarte MD, 10 mL at 06/09/24 0904    sodium chloride 0.9 % flush 10 mL, 10 mL, Intravenous, PRN, Michell Ugarte MD    sodium chloride 0.9 % infusion 40 mL, 40 mL, Intravenous, PRN, Michell Ugarte MD     Assessment & Plan   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  Large-bore bilateral femoral sheath placement for endovascular graft deployment.  Right 22 Belgian and left 12 Belgian  3.  Bifurcated endovascular repair of aortic aneurysm extending into the right external iliac artery and left common iliac artery  - 6/5/24 (Eric):  1.  Left renal distal inferior artery embolization for hemorrhage  2.  Graftmaster covered stent placement for distal renal artery thrombus versus dissection    Patient's hemoglobin remained relatively stable status post transfusion day prior.  Patient's abdominal exam remains soft and is currently in bowel movements  no signs of abdominal compartment syndrome.  Her white count has remained within the normal limits.  She continues to make minimal urine.  Nephrology will continue to follow and determine whether or not she needs continued renal replacement therapy.  Vascular surgery appreciates the aid in the management of this critically ill patient    Junior Sharon MD  06/09/24  09:12 EDT

## 2024-06-09 NOTE — PROGRESS NOTES
Intensive Care Follow-up     Hospital:  LOS: 4 days   Ms. Dalila Velez, 74 y.o. 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 Initial surgery was completed without complication however the patient began to become hemodynamically unstable and was found to have a hemoglobin of 6.6 down from 12.9 g preoperatively. She was taken emergently back to the operating room and a leak from the renal artery was repaired. She has received a total of 7 units of packed red blood cells as well as associated plasma and cryo. She is brought to the intensive care unit in an intubated state for recovery.   Interval History:  The chart has been reviewed.  The patient has remained afebrile overnight.  He did receive a 2-hour hemodialysis run yesterday.  Hemodynamics have remained stable.  While I was in the room I was able to decrease the FiO2 to 45%.  PEEP remains at 10.    It was noticed on telemetry that she was having isolated ST elevations that were only intermittent.  EKGs were checked x 2 which did show some ST elevation in 1 or 2 lateral leads.  However, this has resolved.    The patient's past medical, surgical and social history were reviewed and updated in Epic as appropriate.        Objective     Infusions:  fentanyl 10 mcg/mL,  mcg/hr, Last Rate: 100 mcg/hr (06/09/24 0437)  niCARdipine, 5-15 mg/hr, Last Rate: Stopped (06/07/24 2048)      Medications:  amLODIPine, 10 mg, Nasogastric, Q24H  aspirin, 325 mg, Nasogastric, Daily  bisoprolol, 2.5 mg, Nasogastric, Daily  budesonide, 0.5 mg, Nebulization, BID - RT  bumetanide, 2 mg,  "Intravenous, Once  chlorhexidine, 15 mL, Mouth/Throat, Q12H  [Held by provider] enoxaparin, 30 mg, Subcutaneous, Daily  famotidine, 20 mg, Intravenous, Daily  insulin regular, 2-9 Units, Subcutaneous, Q6H  ipratropium-albuterol, 3 mL, Nebulization, Q6H - RT  levothyroxine, 100 mcg, Nasogastric, Q AM  polyethylene glycol, 17 g, Nasogastric, Daily  ProSource No Carb, 30 mL, Nasogastric, Daily  sertraline, 200 mg, Nasogastric, Daily  sodium bicarbonate, 25 mEq, Intravenous, Once  sodium chloride, 10 mL, Intravenous, Q12H        Vital Sign Min/Max for last 24 hours  Temp  Min: 98.1 °F (36.7 °C)  Max: 99.3 °F (37.4 °C)   BP  Min: 101/48  Max: 139/49   Pulse  Min: 70  Max: 86   Resp  Min: 16  Max: 16   SpO2  Min: 92 %  Max: 100 %   No data recorded       Input/Output for last 24 hour shift  06/08 0701 - 06/09 0700  In: 1326.5 [I.V.:542.5]  Out: 1110 [Urine:310]   Mode: VC+/AC  FiO2 (%):  [40 %-55 %] 55 %  S RR:  [16] 16  S VT:  [410 mL] 410 mL  PEEP/CPAP (cm H2O):  [10 cm H20] 10 cm H20  MAP (cm H2O):  [14-17] 15  Objective:  General Appearance:  In no acute distress and uncomfortable (Sedated on the ventilator.).    Vital signs: (most recent): Blood pressure 127/53, pulse 76, temperature 99.3 °F (37.4 °C), temperature source Bladder, resp. rate 16, height 168.9 cm (66.5\"), weight 81.4 kg (179 lb 7.3 oz), SpO2 98%.    HEENT: (Endotracheal tube in place.)    Lungs:  Normal effort and normal respiratory rate.  There are rales and decreased breath sounds.  No rhonchi.    Heart: Normal rate.  Regular rhythm.  S1 normal and S2 normal.    Abdomen: (Abdomen distended and tight.  ).  Hypoactive bowel sounds.     Extremities: There is dependent edema.  There is no deformity.    Neurological: (Currently sedated).    Pupils:  Pupils are equal, round, and reactive to light.  Pupils are equal. (Scleral edema).    Skin:  Warm and pale.                Results from last 7 days   Lab Units 06/09/24  0432 06/08/24  6524 06/08/24  2376 "   WBC 10*3/mm3 6.49 6.59 6.59   HEMOGLOBIN g/dL 7.8* 7.9* 8.2*   PLATELETS 10*3/mm3 81* 79* 80*     Results from last 7 days   Lab Units 06/09/24  0432 06/08/24  2359 06/08/24  1809 06/06/24  0514 06/06/24  0034   SODIUM mmol/L 133* 136 138   < > 148*   POTASSIUM mmol/L 5.1 4.9 4.7   < > 3.6   CO2 mmol/L 16.0* 17.0* 18.0*   < > 23.0   BUN mg/dL 53* 47* 42*   < > 30*   CREATININE mg/dL 4.34* 4.29* 4.10*   < > 2.20*   MAGNESIUM mg/dL  --   --   --   --  1.6   GLUCOSE mg/dL 65 63* 64*   < > 176*    < > = values in this interval not displayed.     Estimated Creatinine Clearance: 12.5 mL/min (A) (by C-G formula based on SCr of 4.34 mg/dL (H)).    Results from last 7 days   Lab Units 06/09/24  0356   PH, ARTERIAL pH units 7.284*   PCO2, ARTERIAL mm Hg 37.5   PO2 ART mm Hg 65.6*           I reviewed the patient's results and images.     Assessment & Plan   Impression        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    Renal artery stenosis    Hypertension    Hypothyroidism    Acute blood loss anemia    Acute kidney injury       Plan        Continue with current ventilator support we will continue to wean as able.  She is not yet ready for extubation today.  Will continue to follow the patient's heart rhythm.  She does not appear to have ST elevation myocardial infarction indications at this time.  It is possible she is having intermittent vasospasm.  If this were to repeat or sustain then we will involve the cardiology team.  Check echocardiogram for now as the previous echo on record was in 2014.  Nutritional support as before.  Patient remains critically ill and at imminent risk of death.    Plan of care and goals reviewed with mulitdisciplinary/antibiotic stewardship team during rounds.   I discussed the patient's findings and my recommendations with nursing staff     High level of risk due to:  drug(s) requiring intensive monitoring for toxicity and parenteral controlled substances.    Time spent  Critical care 33 min (exclusive of procedure time)  including high complexity decision making to assess, manipulate, and support vital organ system failure in this individual who has impairment of one or more vital organ systems such that there is a high probability of imminent or life threatening deterioration in the patient’s condition.      Saúl Cao MD, [facility]  Pulmonology and Critical Care Medicine

## 2024-06-10 PROBLEM — J96.01 ACUTE RESPIRATORY FAILURE WITH HYPOXIA: Status: ACTIVE | Noted: 2024-06-10

## 2024-06-10 NOTE — PROGRESS NOTES
"   LOS: 5 days   Patient Care Team:  rFannie Couch MD as PCP - General      Subjective     POD#5 s/p EVAR, followed by emergent left renal artery embolization with stent placement for hemorrhage (6/5/24, Eric). She remains intubated and sedated, opens her eyes but is unable to follow commands. Required 1 unit pRBC yesterday, Hgb 8.2 today. Tube feeds discontinued this AM due to residuals. Had low sats with CXR today and required increased O2. Per nursing, abdominal pressure remains stable at 8.     Subjective    History taken from: chart RN    Objective     Vital Signs  Temp:  [97.5 °F (36.4 °C)-99.1 °F (37.3 °C)] 99.1 °F (37.3 °C)  Heart Rate:  [68-88] 68  Resp:  [16-20] 16  BP: (111-133)/(40-62) 117/49  FiO2 (%):  [40 %-100 %] 80 %    Objective:  Vital signs: (most recent): Blood pressure 117/49, pulse 68, temperature 99.1 °F (37.3 °C), temperature source Bladder, resp. rate 16, height 168.9 cm (66.5\"), weight 81.7 kg (180 lb 1.9 oz), SpO2 96%.            Physical Exam:  Present at bedside: nursing   General: no acute distress, intubated and sedated   Respiratory: ETT tube secured, NG tube in place  Abdomen: tight and distended  Bilateral Groins: access sites c/d/I, no surrounding ecchymosis, no evidence of hematoma  Extremities: warm and pink with trace edema  Vascular: audible DP signals bilaterally     Results Review:     I reviewed the patient's new clinical results.  CBC    Results from last 7 days   Lab Units 06/10/24  0551 06/10/24  0117 06/09/24  1824 06/09/24  1207 06/09/24  0432 06/08/24  2359 06/08/24  1809   WBC 10*3/mm3 5.96 7.87 6.02 6.76 6.49 6.59 6.59   HEMOGLOBIN g/dL 8.2* 8.5* 7.0* 7.4* 7.8* 7.9* 8.2*   PLATELETS 10*3/mm3 85* 85* 78* 84* 81* 79* 80*     BMP   Results from last 7 days   Lab Units 06/10/24  0551 06/09/24  1824 06/09/24  1207 06/09/24  0432 06/08/24  2359 06/08/24  1809 06/08/24  1247 06/06/24  0514 06/06/24  0034   SODIUM mmol/L 137  137 138 136 133* 136 138 138   < > 148* "   POTASSIUM mmol/L 5.4*  5.4* 5.2 5.2 5.1 4.9 4.7 4.8   < > 3.6   CHLORIDE mmol/L 102  102 104 102 100 103 105 104   < > 114*   CO2 mmol/L 18.0*  18.0* 18.0* 18.0* 16.0* 17.0* 18.0* 19.0*   < > 23.0   BUN mg/dL 64*  64* 58* 57* 53* 47* 42* 55*   < > 30*   CREATININE mg/dL 5.85*  5.85* 5.32* 4.89* 4.34* 4.29* 4.10* 2.94*   < > 2.20*   GLUCOSE mg/dL 109*  109* 94 76 65 63* 64* 75   < > 176*   MAGNESIUM mg/dL 2.0  --   --  1.9  --   --   --   --  1.6   PHOSPHORUS mg/dL 9.1*  --   --   --   --   --   --   --   --     < > = values in this interval not displayed.     Infection   Results from last 7 days   Lab Units 06/07/24  1224   RESPCX  Scant growth (1+) Normal respiratory elan. No S. aureus or Pseudomonas aeruginosa detected. Final report.     CMP   Results from last 7 days   Lab Units 06/10/24  0551 06/09/24  1824 06/09/24  1207 06/09/24  0432 06/08/24  2359 06/08/24  1809 06/08/24  1247   SODIUM mmol/L 137  137 138 136 133* 136 138 138   POTASSIUM mmol/L 5.4*  5.4* 5.2 5.2 5.1 4.9 4.7 4.8   CHLORIDE mmol/L 102  102 104 102 100 103 105 104   CO2 mmol/L 18.0*  18.0* 18.0* 18.0* 16.0* 17.0* 18.0* 19.0*   BUN mg/dL 64*  64* 58* 57* 53* 47* 42* 55*   CREATININE mg/dL 5.85*  5.85* 5.32* 4.89* 4.34* 4.29* 4.10* 2.94*   GLUCOSE mg/dL 109*  109* 94 76 65 63* 64* 75   ALBUMIN g/dL 2.8*  --   --   --   --   --   --    BILIRUBIN mg/dL 0.3  --   --   --   --   --   --    ALK PHOS U/L 150*  --   --   --   --   --   --    AST (SGOT) U/L 71*  --   --   --   --   --   --    ALT (SGPT) U/L <5  --   --   --   --   --   --      ABG    Results from last 7 days   Lab Units 06/10/24  0349 06/09/24  0356 06/08/24  0400 06/07/24  0146 06/06/24 2004 06/06/24  0330 06/05/24  1752   PH, ARTERIAL pH units 7.262* 7.284* 7.370 7.387 7.435 7.466* 7.404   PCO2, ARTERIAL mm Hg 42.2 37.5 35.1 35.6 32.0* 35.8 32.1*   PO2 ART mm Hg 71.2* 65.6* 55.5* 66.0* 48.9* 72.5* 68.9*   BASE EXCESS ART mmol/L -7.5* -8.2* -4.6* -3.3* -2.4* 2.1* -3.9*           Current Facility-Administered Medications:     amLODIPine (NORVASC) tablet 10 mg, 10 mg, Nasogastric, Q24H, Saúl Cao MD, 10 mg at 06/08/24 0849    bisoprolol (ZEBeta) tablet 2.5 mg, 2.5 mg, Nasogastric, Daily, Saúl Cao MD, 2.5 mg at 06/08/24 0849    budesonide (PULMICORT) nebulizer solution 0.5 mg, 0.5 mg, Nebulization, BID - RT, Karan Munroe APRN, 0.5 mg at 06/10/24 0707    Calcium Replacement - Follow Nurse / BPA Driven Protocol, , Does not apply, PRN, Michell Ugarte MD    chlorhexidine (PERIDEX) 0.12 % solution 15 mL, 15 mL, Mouth/Throat, Q12H, Michell Ugarte MD, 15 mL at 06/10/24 0851    dextrose (D50W) (25 g/50 mL) IV injection 10-50 mL, 10-50 mL, Intravenous, Q15 Min PRN, Karan Munroe APRN    diazePAM (VALIUM) tablet 5 mg, 5 mg, Nasogastric, Daily PRN, Saúl Cao MD    [Held by provider] Enoxaparin Sodium (LOVENOX) syringe 30 mg, 30 mg, Subcutaneous, Daily, Theodore Reyes MD, 30 mg at 06/07/24 0820    famotidine (PEPCID) injection 20 mg, 20 mg, Intravenous, Daily, Michell Ugarte MD, 20 mg at 06/10/24 0851    fentaNYL (SUBLIMAZE) bolus from bag 10 mcg/mL injection 50 mcg, 50 mcg, Intravenous, Q30 Min PRN, Scott Perkins MD, 50 mcg at 06/10/24 0118    fentaNYL 2500 mcg/250 mL NS infusion,  mcg/hr, Intravenous, Titrated, Scott Perkins MD, Last Rate: 12.5 mL/hr at 06/10/24 0617, 125 mcg/hr at 06/10/24 0617    glucagon (GLUCAGEN) injection 1 mg, 1 mg, Intramuscular, Q15 Min PRN, Ludwig, Karan J, APRN    heparin (porcine) injection 1,000 Units, 1,000 Units, Intracatheter, PRN, Serafin Junior MD, 1,000 Units at 06/08/24 1545    HYDROcodone-acetaminophen (NORCO) 5-325 MG per tablet 1 tablet, 1 tablet, Nasogastric, Q4H PRN, Saúl Cao MD, 1 tablet at 06/08/24 0424    insulin regular (humuLIN R,novoLIN R) injection 2-9 Units, 2-9 Units, Subcutaneous, Q6H, Karan Munroe, MORIAH, 2 Units at 06/06/24 9828     ipratropium-albuterol (DUO-NEB) nebulizer solution 3 mL, 3 mL, Nebulization, Q4H PRN, Yesenia Osorio, APRN, 3 mL at 06/06/24 1656    ipratropium-albuterol (DUO-NEB) nebulizer solution 3 mL, 3 mL, Nebulization, Q6H - RT, Karan Munroe, APRN, 3 mL at 06/10/24 0707    levothyroxine (SYNTHROID, LEVOTHROID) tablet 100 mcg, 100 mcg, Nasogastric, Q AM, Saúl Cao MD, 100 mcg at 06/10/24 0552    Magnesium Low Dose Replacement - Follow Nurse / BPA Driven Protocol, , Does not apply, PRN, Michell Ugarte MD    metoclopramide (REGLAN) injection 5 mg, 5 mg, Intravenous, Q8H, Theodore Reyes MD    morphine injection 1 mg, 1 mg, Intravenous, Q4H PRN **AND** naloxone (NARCAN) injection 0.4 mg, 0.4 mg, Intravenous, Q5 Min PRN, Saúl Cao MD    niCARdipine (CARDENE) 50 mg in sodium chloride 0.9 % 250 mL IVPB, 5-15 mg/hr, Intravenous, Titrated, UniversJunior MD    nitroglycerin (NITROSTAT) SL tablet 0.4 mg, 0.4 mg, Sublingual, Q5 Min PRN, Theodore Reyes MD    ondansetron (ZOFRAN) injection 4 mg, 4 mg, Intravenous, Q6H PRN, Michell Ugarte MD    Phosphorus Replacement - Follow Nurse / BPA Driven Protocol, , Does not apply, PRN, Michell Ugarte MD    polyethylene glycol (MIRALAX) packet 17 g, 17 g, Nasogastric, Daily, Saúl Cao MD, 17 g at 06/10/24 0851    polyvinyl alcohol (LIQUIFILM) 1.4 % ophthalmic solution 2 drop, 2 drop, Both Eyes, Q1H PRN, Michell Ugarte MD    Potassium Replacement - Follow Nurse / BPA Driven Protocol, , Does not apply, PRN, Michell Ugarte MD    ProSource No Carb oral solution 30 mL, 30 mL, Nasogastric, Daily, Varsha Pettit, RADHA, 30 mL at 06/10/24 0851    sertraline (ZOLOFT) tablet 200 mg, 200 mg, Nasogastric, Daily, Saúl Cao MD, 200 mg at 06/10/24 0851    sodium chloride 0.9 % flush 10 mL, 10 mL, Intravenous, Q12H, Michell Ugarte MD, 10 mL at 06/10/24 0851    sodium chloride 0.9 % flush 10 mL, 10 mL, Intravenous, PRN,  Michell Ugarte MD    sodium chloride 0.9 % infusion 40 mL, 40 mL, Intravenous, PRN, Michell Ugarte MD     Assessment & Plan       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    Renal artery stenosis    Hypertension    Hypothyroidism    Acute blood loss anemia    Acute kidney injury      Assessment & Plan  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  Large-bore bilateral femoral sheath placement for endovascular graft deployment.  Right 22 [place] and left 12 [place]  3.  Bifurcated endovascular repair of aortic aneurysm extending into the right external iliac artery and left common iliac artery  - 6/5/24 (Eric):  1.  Left renal distal inferior artery embolization for hemorrhage  2.  Graftmaster covered stent placement for distal renal artery thrombus versus dissection    - Labs reviewed: Hgb 8.2, received 7units pRBC 6/5, required 1 unit pRBC 6/9  - SBP recommendations <160  - hold aspirin, lovenox ppx  - continue IV fluids  - tube feedings held due to residuals, leave NG in place  - corpack for distal feeding  - start Reglan 5mg TID, to continue until able to tolerate tube feedings  - continue to monitor abdominal pressures, stable at 8 over the weekend  - Pulm/CC following, appreciate assistance   - nephrology following for worsening renal function, 2 hour dialysis 6/8, plans for dialysis today     Patient's case was reviewed in detail with Dr. Reyes who helped direct the above plan of care. Plan reviewed with nursing at bedside who verbalized understanding and agreement.     Ani Bergeron PA-C  06/10/24  09:08 EDT

## 2024-06-10 NOTE — CASE MANAGEMENT/SOCIAL WORK
Continued Stay Note   [facility]     Patient Name: Dalila Velez  MRN: [id number]  Today's Date: 6/10/2024    Admit Date: 6/5/2024    Plan: ongoing   Discharge Plan       Row Name 06/10/24 1312       Plan    Plan ongoing    Patient/Family in Agreement with Plan other (see comments)    Plan Comments Discussed in MDR, patient remains intubated/sedated w/NG and tube feeds, which are currently on hold due to residuals. Non tunneled HD cath placed on 6/8 w/initiation of HD on 6/8, scheduled for HD today. Vascular, nephrology following. D/C plan @ this time TBD. CM will continue to follow.    Final Discharge Disposition Code 30 - still a patient                   Discharge Codes    No documentation.                 Expected Discharge Date and Time       Expected Discharge Date Expected Discharge Time    Jun 14, 2024               Jame Villarreal RN

## 2024-06-10 NOTE — PROGRESS NOTES
"INTENSIVIST   PROGRESS NOTE     Hospital:  LOS: 5 days     Chief Complaint: AAA rupture, hypoxic respiratory failure    Subjective   S     Interval History: Patient remains critically ill on mechanical ventilation. Episode this morning during CXR where oxygenation dropped patient required bag ventilation via ETT. Hemodynamically stable.    The patient's relevant past medical, surgical and social history were reviewed and updated in Epic as appropriate.      Objective   O     Intake/Ouptut 24 hrs (7:00AM - 6:59 AM)  Intake & Output (last 3 days)         06/07 0701  06/08 0700 06/08 0701 06/09 0700 06/09 0701  06/10 0700 06/10 0701 06/11 0700    I.V. (mL/kg) 1556.7 (19.6) 542.5 (6.7) 227.2 (2.8)     Blood  304 278     Other 120 270 502 20    NG/ 210 650     IV Piggyback        Total Intake(mL/kg) 1936.7 (24.3) 1326.5 (16.3) 1657.2 (20.3) 20 (0.2)    Urine (mL/kg/hr) 480 (0.3) 310 (0.2) 210 (0.1)     Stool 0       Dialysis  800      Total Output 480 1110 210     Net +1456.7 +216.5 +1447.2 +20            Stool Unmeasured Occurrence 2 x        Medications (drips):  fentanyl 10 mcg/mL, Last Rate: 125 mcg/hr (06/10/24 0617)  niCARdipine    Respiratory Support: MV    Physical Examination:  Vital Signs: Blood pressure 120/52, pulse 68, temperature 99 °F (37.2 °C), temperature source Bladder, resp. rate 16, height 168.9 cm (66.5\"), weight 81.7 kg (180 lb 1.9 oz), SpO2 92%.    General: Critically ill-appearing.  On mechanical ventilation.  ENT/Neck: ETT in place.  No JVD.  Moist base membranes.  Chest: Clear to auscultation bilaterally, No wheezing, rhonchi, or rales.  Synchronous on mechanical ventilation.  Equal chest rise.  Cardiac: Normal rate, S1S2 auscultated. No murmurs, rubs or gallops.   Abdomen: Soft, non-tender, non-distended, positive bowel sounds in all four quadrants.   Extremities: No lower extremity edema. No clubbing or cyanosis.  Skin: No rashes, open wounds, or bruising. Warm, dry, " well-perfused.  Neuro: Intubated, sedated.    Lines, Drains & Airways       Active LDAs       Name Placement date Placement time Site Days    CVC Triple Lumen 06/05/24 Non-tunneled Right Internal jugular 06/05/24  2130  Internal jugular  4    Peripheral IV 06/05/24 0723 Distal;Left;Posterior Forearm 06/05/24  0723  Forearm  5    Peripheral IV 06/05/24 1700 Anterior;Proximal;Right Forearm 06/05/24  1700  Forearm  4    NG/OG Tube Nasogastric 18 Fr Right nostril 06/05/24  1630  Right nostril  4    NG/OG Tube Nasoduodenal 10 Fr Left nostril 06/10/24  1000  Left nostril  less than 1    Urethral Catheter Non-latex;Temperature probe 16 Fr. 06/05/24  1629  -- 4    ETT  06/05/24  1404  created via procedure documentation  -- 4    Arterial Line 06/05/24 Right Radial 06/05/24  1700  Radial  4    Hemodialysis Cath Double with Pigtail 06/08/24  1230  Internal Jugular  2        Results from last 7 days   Lab Units 06/10/24  1211 06/10/24  0551 06/10/24  0117   WBC 10*3/mm3 6.77 5.96 7.87   HEMOGLOBIN g/dL 8.0* 8.2* 8.5*   MCV fL 87.0 86.3 88.2   PLATELETS 10*3/mm3 82* 85* 85*     Results from last 7 days   Lab Units 06/10/24  1211 06/10/24  0551 06/09/24  1824 06/09/24  1207 06/09/24  0432 06/06/24  0514 06/06/24  0034   SODIUM mmol/L 137 137  137 138   < > 133*   < > 148*   POTASSIUM mmol/L 5.8* 5.4*  5.4* 5.2   < > 5.1   < > 3.6   CO2 mmol/L 19.0* 18.0*  18.0* 18.0*   < > 16.0*   < > 23.0   CREATININE mg/dL 5.45* 5.85*  5.85* 5.32*   < > 4.34*   < > 2.20*   GLUCOSE mg/dL 86 109*  109* 94   < > 65   < > 176*   MAGNESIUM mg/dL  --  2.0  --   --  1.9  --  1.6   PHOSPHORUS mg/dL  --  9.1*  --   --   --   --   --     < > = values in this interval not displayed.     Estimated Creatinine Clearance: 10 mL/min (A) (by C-G formula based on SCr of 5.45 mg/dL (H)).  Results from last 7 days   Lab Units 06/10/24  0551   ALK PHOS U/L 150*   BILIRUBIN mg/dL 0.3   ALT (SGPT) U/L <5   AST (SGOT) U/L 71*     Results from last 7 days   Lab  Units 06/10/24  0349 06/09/24  0356 06/08/24  0400 06/07/24  0146 06/06/24 2004   PH, ARTERIAL pH units 7.262* 7.284* 7.370 7.387 7.435   PCO2, ARTERIAL mm Hg 42.2 37.5 35.1 35.6 32.0*   PO2 ART mm Hg 71.2* 65.6* 55.5* 66.0* 48.9*   FIO2 % 60 40 40 100 55     Images:  XR Abdomen KUB    Result Date: 6/10/2024  Impression: Nasogastric tube tip and sidehole overlie the stomach. Weighted tip feeding catheter tip overlies the third portion of the duodenum. Electronically Signed: Malik Mcneal MD  6/10/2024 12:06 PM EDT  Workstation ID: [id number]    XR Chest 1 View    Result Date: 6/10/2024  Impression: Support hardware projects unchanged. There are likely mildly increased small to moderate bilateral pleural effusions. There is no distinct pneumothorax or new focal lobar consolidation. Unchanged mild cardiac enlargement. Electronically Signed: Steven Ko MD  6/10/2024 6:40 AM EDT  Workstation ID: [id number]     ECHO (6/09/2024):     Left ventricular ejection fraction appears to be 61 - 65%.    Left ventricular diastolic function was normal.    The aortic valve exhibits sclerosis.    Aortic valve maximum pressure gradient is 16 mmHg.    Estimated right ventricular systolic pressure from tricuspid regurgitation is mildly elevated (35-45 mmHg). Calculated right ventricular systolic pressure from tricuspid regurgitation is 41 mmHg.    Results: Reviewed.  - I reviewed the patient's new laboratory and imaging results.  - I independently reviewed the patient's new images.    Medications: Reviewed.    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     Patient Agustin is a 74-year-old female with past medical history coronary artery disease, peripheral vascular disease, hypertension, CKD III and previous renal artery stenosis with stenting who underwent endovascular repair today of a juxtarenal abdominal aortic aneurysm. Initial surgery was completed without complication however the patient began to become hemodynamically unstable and was found to have a hemoglobin of 6.6 down from 12.9 preoperatively. She was taken emergently back to the operating room and a leak from the renal artery was repaired.     #Juxtarenal AAA  -Primary management per vascular surgery. Status post endovascular repair    #MICHAEL on CKD III  #Hyperkalemia  -Baseline creatinine 1.4-1.5  -Right atrophic kidney and left renal hemorrhage status post distal inferior branch embolization (6/05)   -Nephrology following. iHD, last on 6/10     #Acute hypoxic respiratory failure requiring intubation/mechanical ventilation  -Mechanical ventilation adjustments daily  -Daily CXR, blood gas     #Acute blood loss anemia  -Transfuse RBC for hemoglobin less than 7  -Holding ASA, Lovenox    #Hypothyroidism  -Continue levothyroxine    F-Tube feeds per dietary recs  A-Fentanyl  S-NA  T-Held 2/2 acute blood loss anemia  H-Head of bed greater than 30 degrees  U-Pepcid  G-FSBS per unit protocol, correction dose insulin  S-SBT assessment daily  B-Will monitor daily and provide regimen if indicated  I-CVC (6/05), arterial line (6/05)  D-NA    Advance Directives:   Code Status and Medical Interventions:   Ordered at: 06/06/24 0903     Level Of Support Discussed With:    Patient     Code Status (Patient has no pulse and is not breathing):    CPR (Attempt to Resuscitate)     Medical Interventions (Patient has pulse or is breathing):    Full Support     High level of risk due to severe exacerbation of chronic illness and illness with threat to life or bodily  function.    I conducted multidisciplinary rounds in the plan of care was discussed with the multidisciplinary team at that time. In attendance at multidisciplinary rounds was clinical pharmacist, dietitian, nursing staff and case management.    I discussed the patient's findings and my recommendations with patient, nursing staff, and consulting provider.      Critical Care Time: Critical Care time spent in direct patient care: 40 minutes (excluding procedure time, if applicable) including high complexity decision making to assess, manipulate, and support vital organ system failure in this individual who has impairment of one or more vital organ systems such that there is a high probability of imminent or life threatening deterioration in the patient’s condition.     -- Albert Morales MD  Pulmonary/Critical Care

## 2024-06-10 NOTE — PROGRESS NOTES
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    PSH:  She  has a past surgical history that includes Cholecystectomy; Coronary angioplasty; Renal artery stent (Bilateral, 2012); Carotid endarterectomy (Left, 2006); Total hip arthroplasty (Left, 06/2017); Cataract extraction, bilateral; Hysterectomy; Cervical spine surgery; Colonoscopy; AAA repair, open (N/A, 6/5/2024); and AAA repair, open (N/A, 6/5/2024).    Applicable Nutrition History:   ARF/VENT 6-5-24  s/p EVAR 6-5-24 6-5-24:Hemorrhagic shock, s/p Left renal distal inferior artery embolization for hemorrhage, Graftmaster covered stent placement for distal renal artery thrombus versus dissection    SKIN: surgical sites, L gluteal ulcer    Labs    Labs Reviewed: Yes    Results from last 7 days   Lab Units 06/10/24  1211 06/10/24  0551 06/09/24  1824 06/09/24  1207 06/09/24  0432 06/08/24  1809 06/08/24  1247 06/08/24  0537 06/08/24  0056 06/06/24  0514 06/06/24  0034   GLUCOSE mg/dL 86 109*  109* 94   < > 65   < > 75 92 97   < > 176*   BUN mg/dL 64* 64*  64* 58*   < > 53*   < > 55* 53* 50*   < > 30*   CREATININE mg/dL 5.45* 5.85*  5.85* 5.32*   < > 4.34*   < > 2.94* 4.72*  "4.20*   < > 2.20*   SODIUM mmol/L 137 137  137 138   < > 133*   < > 138 137 138   < > 148*   CHLORIDE mmol/L 103 102  102 104   < > 100   < > 104 104 105   < > 114*   POTASSIUM mmol/L 5.8* 5.4*  5.4* 5.2   < > 5.1   < > 4.8 4.6 4.5   < > 3.6   PHOSPHORUS mg/dL  --  9.1*  --   --   --   --   --   --   --   --   --    MAGNESIUM mg/dL  --  2.0  --   --  1.9  --   --   --   --   --  1.6   ALT (SGPT) U/L  --  <5  --   --   --   --   --   --   --   --   --    LACTATE mmol/L  --   --   --   --   --   --  0.7 0.8 1.0   < > 5.0*    < > = values in this interval not displayed.       Results from last 7 days   Lab Units 06/10/24  0551 06/09/24  0432 06/08/24  1247   ALBUMIN g/dL 2.8*  --   --    PREALBUMIN mg/dL 11.1*  --   --    CRP mg/dL 40.25*  --   --    CHOLESTEROL mg/dL 177  --   --    TRIGLYCERIDES mg/dL 679* 1,236* 1,343*       Results from last 7 days   Lab Units 06/10/24  1126 06/10/24  0507 06/09/24  2304 06/09/24  1816 06/09/24  1152 06/09/24  0527   GLUCOSE mg/dL 97 124 102 106 83 81     Lab Results   Lab Value Date/Time    HGBA1C 5.50 05/29/2024 1511               Medications    Medications Reviewed: yes  Pepcid, insulin, reglan, mitalax  + fentanyl    Intake/Ouptut 24 hrs (0701 - 0700)   I&O's Reviewed: yes  Intake & Output (last day)         06/09 0701  06/10 0700 06/10 0701  06/11 0700    I.V. (mL/kg) 227.2 (2.8)     Blood 278     Other 502 222    NG/ 314    Total Intake(mL/kg) 1657.2 (20.3) 536 (6.6)    Urine (mL/kg/hr) 210 (0.1)     Emesis/NG output  500    Dialysis      Total Output 210 500    Net +1447.2 +36                    Anthropometrics     Height: Height: 168.9 cm (66.5\")  Last Filed Weight: Weight: 81.7 kg (180 lb 1.9 oz) (06/10/24 0600)  Method: Weight Method: Bed scale  BMI: BMI (Calculated): 28.6    UBW: Per EMR wt of 150 lbs on 11/23/22, 148 lbs on 1/24/24 Standing Scale wt of 144 lbs on 5/9/24   Weight change: ? Accuracy of current bed wt. Non significant change per prior hx     " "Weight       Weight (kg) Weight (lbs) Weight Method Visit Report   2/15/2017 69.945 kg  154 lb 3.2 oz   --    2017 69.854 kg  154 lb      2017 68.04 kg  150 lb  Stated     2017 67.132 kg  148 lb   --    2017 66.679 kg  147 lb  Stated     2017 67.132 kg  148 lb   --    2018 67.132 kg  148 lb      2018 67.45 kg  148 lb 11.2 oz   --    2019 69.854 kg  154 lb   --    2019 69.854 kg  154 lb      2020 73.936 kg  163 lb   --    2021 67.586 kg  149 lb   --    2022 68.357 kg  150 lb 11.2 oz   --    2023 68.04 kg  150 lb      2024 67.223 kg  148 lb 3.2 oz   --    2024 65.35 kg  144 lb 1.1 oz  Standing scale     2024 65.35 kg  144 lb 1.1 oz      2024 80.9 kg  178 lb 5.6 oz  Bed scale     2024 79.6 kg  175 lb 7.8 oz  Bed scale     2024 81.4 kg  179 lb 7.3 oz      6/10/2024 81.7 kg  180 lb 1.9 oz  Bed scale           Nutrition Focused Physical Exam     Date:     Unable to perform due to Pt unable to participate at time of visit     Subjective   Reported/Observed/Food/Nutrition Related History:     6-10-24: pt intubated, sedated, + fentanyl    Per RN: pt has GRV 700ml this am, TF held, keofeed placed, reglan ordered, tf restarted at 15ml, pt wakes up but does not follow commands, plan for HD again today        Pt NPO > 72 hr.    Needs Assessment   Date: 6/10    Height used:Height: 168.9 cm (66.5\")  Weights used: standing scale wt: 144lb/ 65kg    Estimated Calorie needs: ~1500kcal  Method:  25Kcals/Kkcal  Method:  MSJ x 1.2: 1416kcal  Method:  PSU: 1355kcal    Estimated Protein needs: ~88g protein with HD   Method:1.2-1.5g protein per k-98g protein    Current Nutrition Prescription     PO: NPO Diet NPO Type: Tube Feeding  Oral Nutrition Supplement:  Intake: N/A    EN: Peptamen 1.5  Goal Rate:   Water Flushes:   Modular: Prosource -no carb 1/day  Route: ND  Tube: Small bore    At goal over: 20Hrs/day     Rx will supply: "   Goal Volume 900  mL/day     Flush Volume 400 mL/day     Energy 1410 Kcal/day 94 % Est Need   Protein 76 g/day 86 % Est Need   Fiber 0 g/day     Water in   mL     Total Water 1093 mL     Meet DRI No        --------------------------------------------------------------------------  Product/Rate verified at bedside: Yes  Infusing Rate at time of visit: 15ml    Average Delivery from [facility] Day:   Volume 650 mL/day 72  % Goal Vol.   Flush Volume 502 mL/day     Energy  Kcal/day  % Est Need   Protein  g/day  % Est Need   Fiber  g/day     Water in  EN  mL     Total Water  mL     Meet DRI No              Assessment & Plan   Nutrition Diagnosis   Date:  Updated: 6-10  Problem Inadequate energy intake    Etiology ARF/VENT   Signs/Symptoms NPO   Status: EN support      Goal:   Nutrition to support treatment and Adjust EN      Nutrition Intervention      Follow treatment progress, Care plan reviewed, Nutrition support order placed    As pt hyperkalemic, will adjust EN to Novasource Renal    Change TF to Novasource Renal @15ml/hr, advance gradaully as tolerated to goal rate @35ml/hr, increase Prosource 2x/day, free water @10ml/hr    TF at goal volume will provide 700ml, 1520kcal,101% kcal needs, 93g protein,106% protein needs,  704ml free water     Suggest add MVI as TF volume too low to meet RDI's      Monitoring/Evaluation:   Per protocol, I&O, Pertinent labs, Weight, Symptoms, POC/GOC    Lisa Finch, RD  Time Spent:60min

## 2024-06-10 NOTE — PLAN OF CARE
Problem: Device-Related Complication Risk (Hemodialysis)  Goal: Safe, Effective Therapy Delivery  Outcome: Ongoing, Not Progressing  Intervention: Optimize Device Care and Function  Recent Flowsheet Documentation  Taken 6/10/2024 1550 by Renetta Hsieh, RN  Medication Review/Management:   medications reviewed   infusion held  Taken 6/10/2024 1400 by Renetta Hsieh, RN  Medication Review/Management:   medications reviewed   high-risk medications identified     Problem: Hemodynamic Instability (Hemodialysis)  Goal: Effective Tissue Perfusion  Outcome: Ongoing, Not Progressing     Problem: Infection (Hemodialysis)  Goal: Absence of Infection Signs and Symptoms  Outcome: Ongoing, Progressing   Goal Outcome Evaluation:            HD treatment ended 1.5 hours early due to HD access malfunction, increasing AP parameters despite interventions such as repositioning, lines reversed, decreased BFR. Blood reinfused x2. Hypotension during treatment, SBP <80s. MD Junior called and new orders CRRT, MD Morales aware of HD malfunction. MD Morales repositioned access at bedside. Report given to primary RN Lux.    Fluid removal: 600ml

## 2024-06-10 NOTE — NURSING NOTE
Woc consulted for possible deep tissue injury to the left gluteal.    Right now the purple area is very muted and light.  It is not on a bony prominence.  However it is nonblanching.    It must be noted that patient also has several areas on her back especially on the left side and left flank that appear to clinically match presentations of DIC/micro embolism/coagulopathy ulcers.  Unclear if this left gluteal is a continuation of the spots.    There is also an area of purple to the left distal toe.  Appears possible arterial in nature possible due to acute blood loss anemia.  Is nothing is on the toe it does not appear to be pressure.    For now we will add Venelex to the left buttock we will come back and reassess to see the evolution of this wound to see if it needs mist therapy or see if it needs something else we also keep tabs on the other maroon-purple areas on the back.

## 2024-06-10 NOTE — PLAN OF CARE
Goal Outcome Evaluation:  Plan of Care Reviewed With: patient, daughter        Progress: declining       Able to wean off FiO2 this am, Reglan added, NGT output 500 ml, Keofeed placed, post pyloric feed started, attempted HD this pm, did not tolerate, BP dropped, unable to keep MAP > 60, HD catheter not functioning well, decision made to start on CRRT, pulled back HD catheter per Dr. Morales, Xray obtained, CRRT started, tolerating UF 50, order received to broaden UF . Daughters at bedside and updated, fentanyl continues. IAB dced

## 2024-06-10 NOTE — PROGRESS NOTES
" LOS: 5 days    Patient Care Team:  Frannie Couch MD as PCP - General    Reason For Visit: Acute renal failure.     Subjective     Seen and examined at bedside,Intubated, sedated.    worsening renal function, minimal urine output.   Volume overloaded/edematous.   Plan for dialysis today.      Hypertriglyceridemia noted ??  Could be due to propofol.     Objective     amLODIPine, 10 mg, Nasogastric, Q24H  bisoprolol, 2.5 mg, Nasogastric, Daily  budesonide, 0.5 mg, Nebulization, BID - RT  castor oil-balsam peru, 1 Application, Topical, Q12H  chlorhexidine, 15 mL, Mouth/Throat, Q12H  [Held by provider] enoxaparin, 30 mg, Subcutaneous, Daily  famotidine, 20 mg, Intravenous, Daily  insulin regular, 2-9 Units, Subcutaneous, Q6H  ipratropium-albuterol, 3 mL, Nebulization, Q6H - RT  levothyroxine, 100 mcg, Nasogastric, Q AM  metoclopramide, 5 mg, Intravenous, Q8H  polyethylene glycol, 17 g, Nasogastric, Daily  ProSource No Carb, 30 mL, Nasogastric, Daily  sertraline, 200 mg, Nasogastric, Daily  sodium chloride, 10 mL, Intravenous, Q12H      fentanyl 10 mcg/mL,  mcg/hr, Last Rate: 125 mcg/hr (06/10/24 0617)  niCARdipine, 5-15 mg/hr        Vital Signs:  Blood pressure 112/50, pulse 69, temperature 98.8 °F (37.1 °C), temperature source Bladder, resp. rate 16, height 168.9 cm (66.5\"), weight 81.7 kg (180 lb 1.9 oz), SpO2 92%.    Flowsheet Rows      Flowsheet Row First Filed Value   Admission Height 168.9 cm (66.5\") Documented at 06/05/2024 0740   Admission Weight 65.4 kg (144 lb 1.1 oz) Documented at 06/05/2024 0740            06/09 0701 - 06/10 0700  In: 1657.2 [I.V.:227.2]  Out: 210 [Urine:210]    Physical Exam:  GENERAL: Intubated and sedated.   HEENT: Normocephalic, atraumatic.    NECK: Supple   HEART: RRR; No murmur, rubs, gallops. Trace edema  LUNGS: Mechanical breath sounds.   ABDOMEN: Soft, nontender, nondistended..  EXT:  ++  edema.  : + Rios   SKIN: Warm and dry without rash  NEURO: Unable to " assess  PSYCHIATRIC: Unable to assess    Labs:  Results from last 7 days   Lab Units 06/10/24  1211 06/10/24  0551 06/10/24  0117   WBC 10*3/mm3 6.77 5.96 7.87   HEMOGLOBIN g/dL 8.0* 8.2* 8.5*   HEMATOCRIT % 24.1* 25.2* 25.4*   PLATELETS 10*3/mm3 82* 85* 85*     Results from last 7 days   Lab Units 06/10/24  1211 06/10/24  0551 06/09/24  1824 06/09/24  1207 06/09/24  0432 06/06/24  0514 06/06/24  0034   SODIUM mmol/L 137 137  137 138 136 133*   < > 148*   POTASSIUM mmol/L 5.8* 5.4*  5.4* 5.2 5.2 5.1   < > 3.6   CHLORIDE mmol/L 103 102  102 104 102 100   < > 114*   CO2 mmol/L 19.0* 18.0*  18.0* 18.0* 18.0* 16.0*   < > 23.0   BUN mg/dL 64* 64*  64* 58* 57* 53*   < > 30*   CREATININE mg/dL 5.45* 5.85*  5.85* 5.32* 4.89* 4.34*   < > 2.20*   CALCIUM mg/dL 7.7* 7.8*  7.8* 7.6* 7.6* 7.3*   < > 8.0*   PHOSPHORUS mg/dL  --  9.1*  --   --   --   --   --    MAGNESIUM mg/dL  --  2.0  --   --  1.9  --  1.6   ALBUMIN g/dL  --  2.8*  --   --   --   --   --     < > = values in this interval not displayed.     Results from last 7 days   Lab Units 06/10/24  1211   GLUCOSE mg/dL 86       Results from last 7 days   Lab Units 06/10/24  0551   ALK PHOS U/L 150*   BILIRUBIN mg/dL 0.3   ALT (SGPT) U/L <5   AST (SGOT) U/L 71*     Results from last 7 days   Lab Units 06/10/24  0349   PH, ARTERIAL pH units 7.262*   PO2 ART mm Hg 71.2*   PCO2, ARTERIAL mm Hg 42.2   HCO3 ART mmol/L 19.0*         Estimated Creatinine Clearance: 10 mL/min (A) (by C-G formula based on SCr of 5.45 mg/dL (H)).    Chest  Xray:   IMPRESSION:  Impression:  Worsening small right-sided pleural effusion with probable overlying atelectasis. Mild retraction of the endotracheal tube with the tip in the proximal to mid trachea     CT abdomen:   Impression:  1. Left renal artery stent with high-grade stenosis just distal to the stent, possibly from a dissection flap.  2. Large active extravasation from the mid pole left renal cortex with a large pararenal and  retroperitoneal hematoma.   3. Left lower lobe atelectasis, which was noted at time of dictation. These findings were being communicated to the OR.  4. Abdominal aortic aneurysm status post endograft repair.    Assessment     Acute renal failure  Abdominal aortic aneurysm repair 6/5  Left renal hemorrhage s/p distal inferior branch embolization  Right atrophic kidney  Acute blood loss anemia  Hypertension   Dyslipidemia  Coronary artery disease    PLAN:     Acute renal failure on CKD stage III:   - Baseline renal function 1.4-1.5.  Patient follows up with Dr. Brown.  - Right atrophic kidney.  Left renal hemorrhage s/p distal inferior branch embolization 6/5.   - Urine output minimal.   Hemodialysis initiated on 6/8.  -Will do hemodialysis today and tomorrow.   -Continue Bumex 2 mg daily.   - Strict ROBERTO's.   - Dose meds to GFR   - Avoid nephrotoxins     Metabolic acidosis:   - Managed with dialysis.     Acute blood loss anemia:   -S/p 8 units of PRBC   -Transfuse as needed to keep hemoglobin above 7.      Abdominal aortic aneurysm repair 6/5:   -Vascular following.      Hypertension:   -S/p Cardene drip.  Now off.  Blood pressure control.    High risk and critically ill patient    Serafin Junior MD  06/10/24  15:04 EDT         Addendum:   -Patient was noted to be hypotensive on hemodialysis; was having issues with the catheter as well.  -Discussed with intensivist; will start CRRT as patient is hypotensive.  CRRT order placed.

## 2024-06-11 NOTE — PROCEDURES
Dialysis notes: CRRT  Second visit.  Discussed with RN regards to ultrafiltration.  DC Rios catheter.  High risk complex patient multiple medical problems.  Will continue with the CRRT at this time.

## 2024-06-11 NOTE — NURSING NOTE
CRRT rounds completed. Treatment plan  reviewed with DEBBIE Reyes. Orders and documentation reviewed. Prescription for CRRT machine reviewed with nurse- any needed adjustments made at this time.  Dressing to dialysis access visualized and intact. Dressing change due 6/15/24.  Primary nurse encouraged to call [phone]  or on call dialysis nurse for assistance or any questions.

## 2024-06-11 NOTE — PROGRESS NOTES
"INTENSIVIST   PROGRESS NOTE     Hospital:  LOS: 6 days     Chief Complaint: AAA rupture, hypoxic respiratory failure    Subjective   S     Interval History: No acute issues overnight.     The patient's relevant past medical, surgical and social history were reviewed and updated in Epic as appropriate.      Objective   O     Intake/Ouptut 24 hrs (7:00AM - 6:59 AM)  Intake & Output (last 3 days)         06/07 0701  06/08 0700 06/08 0701 06/09 0700 06/09 0701  06/10 0700 06/10 0701 06/11 0700    I.V. (mL/kg) 1556.7 (19.6) 542.5 (6.7) 227.2 (2.8)     Blood  304 278     Other 120 270 502 20    NG/ 210 650     IV Piggyback        Total Intake(mL/kg) 1936.7 (24.3) 1326.5 (16.3) 1657.2 (20.3) 20 (0.2)    Urine (mL/kg/hr) 480 (0.3) 310 (0.2) 210 (0.1)     Stool 0       Dialysis  800      Total Output 480 1110 210     Net +1456.7 +216.5 +1447.2 +20            Stool Unmeasured Occurrence 2 x        Medications (drips):  fentanyl 10 mcg/mL, Last Rate: 200 mcg/hr (06/11/24 1150)  niCARdipine  Phoxillum BK4/2.5, Last Rate: 1,000 mL/hr (06/11/24 0848)  Phoxillum BK4/2.5, Last Rate: 1,000 mL/hr (06/11/24 0847)  Phoxillum BK4/2.5, Last Rate: 1,000 mL/hr (06/11/24 0848)    Respiratory Support: MV    Physical Examination:  Vital Signs: Blood pressure 135/55, pulse 79, temperature 97.7 °F (36.5 °C), temperature source Bladder, resp. rate 20, height 168.9 cm (66.5\"), weight 81.7 kg (180 lb 1.9 oz), SpO2 90%.    General: Critically ill-appearing.  On mechanical ventilation.  ENT/Neck: ETT in place.  No JVD.  Moist base membranes.  Chest: Clear to auscultation bilaterally, No wheezing, rhonchi, or rales.  Synchronous on mechanical ventilation.  Equal chest rise.  Cardiac: Normal rate, S1S2 auscultated. No murmurs, rubs or gallops.   Abdomen: Soft, non-tender, non-distended, positive bowel sounds in all four quadrants.   Extremities: No lower extremity edema. No clubbing or cyanosis.  Skin: No rashes, open wounds, or bruising. " Warm, dry, well-perfused.  Neuro: Intubated, sedated.    Lines, Drains & Airways       Active LDAs       Name Placement date Placement time Site Days    CVC Triple Lumen 06/05/24 Non-tunneled Right Internal jugular 06/05/24  2130  Internal jugular  4    Peripheral IV 06/05/24 0723 Distal;Left;Posterior Forearm 06/05/24  0723  Forearm  5    Peripheral IV 06/05/24 1700 Anterior;Proximal;Right Forearm 06/05/24  1700  Forearm  4    NG/OG Tube Nasogastric 18 Fr Right nostril 06/05/24  1630  Right nostril  4    NG/OG Tube Nasoduodenal 10 Fr Left nostril 06/10/24  1000  Left nostril  less than 1    Urethral Catheter Non-latex;Temperature probe 16 Fr. 06/05/24  1629  -- 4    ETT  06/05/24  1404  created via procedure documentation  -- 4    Arterial Line 06/05/24 Right Radial 06/05/24  1700  Radial  4    Hemodialysis Cath Double with Pigtail 06/08/24  1230  Internal Jugular  2        Results from last 7 days   Lab Units 06/11/24  1159 06/11/24  0608 06/10/24  2335   WBC 10*3/mm3 8.31 7.98 7.18   HEMOGLOBIN g/dL 8.4* 8.7* 8.2*   MCV fL 90.7 89.6 90.2   PLATELETS 10*3/mm3 98* 94* 81*     Results from last 7 days   Lab Units 06/11/24  0748 06/11/24  0608 06/10/24  2335 06/10/24  1211 06/10/24  0551   SODIUM mmol/L 137 136 135*  135*   < > 137  137   POTASSIUM mmol/L 5.5* 5.4* 5.6*  5.6*   < > 5.4*  5.4*   CO2 mmol/L 21.0* 19.0* 18.0*  18.0*   < > 18.0*  18.0*   CREATININE mg/dL 2.45* 2.58* 3.34*  2.30*   < > 5.85*  5.85*   GLUCOSE mg/dL 78 77 172*  175*   < > 109*  109*   MAGNESIUM mg/dL 2.3  --  2.2  --  2.0   PHOSPHORUS mg/dL 6.7*  --  6.9*  --  9.1*    < > = values in this interval not displayed.     Estimated Creatinine Clearance: 22.2 mL/min (A) (by C-G formula based on SCr of 2.45 mg/dL (H)).  Results from last 7 days   Lab Units 06/10/24  0551   ALK PHOS U/L 150*   BILIRUBIN mg/dL 0.3   ALT (SGPT) U/L <5   AST (SGOT) U/L 71*     Results from last 7 days   Lab Units 06/11/24  0404 06/10/24  1505 06/10/24  0349  06/09/24  0356 06/08/24  0400   PH, ARTERIAL pH units 7.244* 7.220* 7.262* 7.284* 7.370   PCO2, ARTERIAL mm Hg 47.6* 47.7* 42.2 37.5 35.1   PO2 ART mm Hg 73.2* 78.0* 71.2* 65.6* 55.5*   FIO2 % 65 100 60 40 40     Images:  XR Chest 1 View    Result Date: 6/11/2024  Impression: Slightly improved bilateral pleural effusions and bibasilar atelectasis, left greater than right. Unchanged position of lines/tubes. Electronically Signed: Malik Mcneal MD  6/11/2024 7:19 AM EDT  Workstation ID: [id number]    XR Chest 1 View    Result Date: 6/10/2024  Impression: 1. Right and left IJ central venous catheter tips in stable position near low and mid SVC respectively. No pneumothorax. 2. Cardiomegaly with grossly stable bilateral pleural effusions and presumed adjacent bibasal atelectasis. Questionable interstitial edema. Electronically Signed: Madhav Grider MD  6/10/2024 4:33 PM EDT  Workstation ID: [id number]    XR Abdomen KUB    Result Date: 6/10/2024  Impression: Nasogastric tube tip and sidehole overlie the stomach. Weighted tip feeding catheter tip overlies the third portion of the duodenum. Electronically Signed: Malik Mcnela MD  6/10/2024 12:06 PM EDT  Workstation ID: [id number]    XR Chest 1 View    Result Date: 6/10/2024  Impression: Support hardware projects unchanged. There are likely mildly increased small to moderate bilateral pleural effusions. There is no distinct pneumothorax or new focal lobar consolidation. Unchanged mild cardiac enlargement. Electronically Signed: Steven Ko MD  6/10/2024 6:40 AM EDT  Workstation ID: [id number]     ECHO (6/09/2024):     Left ventricular ejection fraction appears to be 61 - 65%.    Left ventricular diastolic function was normal.    The aortic valve exhibits sclerosis.    Aortic valve maximum pressure gradient is 16 mmHg.    Estimated right ventricular systolic pressure from tricuspid regurgitation is mildly elevated (35-45 mmHg). Calculated right ventricular systolic pressure  from tricuspid regurgitation is 41 mmHg.    Results: Reviewed.  - I reviewed the patient's new laboratory and imaging results.  - I independently reviewed the patient's new images.    Medications: Reviewed.    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     Patient Agustin is a 74F with past medical history coronary artery disease, peripheral vascular disease, hypertension, CKD III and previous renal artery stenosis with stenting who underwent endovascular repair today of a juxtarenal abdominal aortic aneurysm. Initial surgery was completed without complication however the patient began to become hemodynamically unstable and was found to have a hemoglobin of 6.6 down from 12.9 preoperatively. She was taken emergently back to the operating room and a leak from the renal artery was repaired.    #Juxtarenal AAA  -Primary management per vascular surgery. Status post endovascular repair. Vascular surgery closely following    #MICHAEL on CKD III  #Hyperkalemia  -Baseline creatinine 1.4-1.5   -Right atrophic kidney and left renal hemorrhage status post distal inferior branch embolization (6/05)   -Nephrology following. iHD, last on 6/10. Had to transition to CRRT on 6/10 given 1) hemodynamic instability/hypotension and 2) difficulty with line when running intermittent HD on 6/10 AM    #Acute hypoxic respiratory failure requiring intubation/mechanical ventilation  -Mechanical ventilation adjustments and SBT assessment daily  -Frequent CXR, blood gas     #Acute blood loss anemia  -Transfuse RBC  for hemoglobin less than 7; Last transfused on 6/09  -Holding ASA, Lovenox    #Hypothyroidism  -Continue levothyroxine    F-Tube feeds per dietary recs  A-Fentanyl  S-NA  T-SQH  H-Head of bed greater than 30 degrees  U-Pepcid   G-FSBS per unit protocol, correction dose insulin  S-SBT assessment daily   B-Will monitor daily and provide regimen if indicated  I-CVC (6/05), arterial line (6/05)   D-NA     Advance Directives:   Code Status and Medical Interventions:   Ordered at: 06/06/24 0903     Level Of Support Discussed With:    Patient     Code Status (Patient has no pulse and is not breathing):    CPR (Attempt to Resuscitate)     Medical Interventions (Patient has pulse or is breathing):    Full Support     High level of risk due to severe exacerbation of chronic illness and illness with threat to life or bodily function.    I conducted multidisciplinary rounds in the plan of care was discussed with the multidisciplinary team at that time. In attendance at multidisciplinary rounds was clinical pharmacist, dietitian, nursing staff and case management.    I discussed the patient's findings and my recommendations with patient, nursing staff, and consulting provider.      Critical Care Time: Critical Care time spent in direct patient care: 45 minutes (excluding procedure time, if applicable) including high complexity decision making to assess, manipulate, and support vital organ system failure in this individual who has impairment of one or more vital organ systems such that there is a high probability of imminent or life threatening deterioration in the patient’s condition.     -- Albert Morales MD  Pulmonary/Critical Care

## 2024-06-11 NOTE — PROGRESS NOTES
"   LOS: 6 days   Patient Care Team:  Frannie Couch MD as PCP - General      Subjective   POD#6 s/p EVAR, followed by emergent left renal artery embolization with stent placement for hemorrhage (6/5/24, Eric). She remains intubated and sedated, opens eyes but still unable to follow commands. Post pyloric feeds started yesterday. HD discontinued early yesterday, CRRT started.     Subjective      History taken from: chart RN    Objective     Vital Signs  Temp:  [95.7 °F (35.4 °C)-99.3 °F (37.4 °C)] 97.5 °F (36.4 °C)  Heart Rate:  [67-85] 83  Resp:  [16-20] 19  BP: ()/(42-65) 144/53  FiO2 (%):  [60 %-100 %] 65 %    Objective:  Vital signs: (most recent): Blood pressure 144/53, pulse 83, temperature 97.5 °F (36.4 °C), temperature source Bladder, resp. rate 19, height 168.9 cm (66.5\"), weight 81.7 kg (180 lb 1.9 oz), SpO2 93%.            Physical Exam:  Present at bedside: nursing   General: no acute distress, intubated and sedated, eyes open but unable to follow commands   Respiratory: ETT tube secured, NG tube in place  Abdomen: tight and distended  Bilateral Groins: access sites c/d/I, no surrounding ecchymosis, no evidence of hematoma  Extremities: warm and pink with trace edema      Results Review:     I reviewed the patient's new clinical results.  CBC    Results from last 7 days   Lab Units 06/11/24  0608 06/10/24  2335 06/10/24  1754 06/10/24  1211 06/10/24  0551 06/10/24  0117 06/09/24  1824   WBC 10*3/mm3 7.98 7.18 6.89 6.77 5.96 7.87 6.02   HEMOGLOBIN g/dL 8.7* 8.2* 8.3* 8.0* 8.2* 8.5* 7.0*   PLATELETS 10*3/mm3 94* 81* 84* 82* 85* 85* 78*     BMP   Results from last 7 days   Lab Units 06/11/24  0748 06/11/24  0608 06/10/24  2335 06/10/24  1754 06/10/24  1211 06/10/24  0551 06/09/24  1824 06/09/24  1207 06/09/24  0432 06/06/24  0514 06/06/24  0034   SODIUM mmol/L 137 136 135*  135* 134* 137 137  137 138   < > 133*   < > 148*   POTASSIUM mmol/L 5.5* 5.4* 5.6*  5.6* 5.5* 5.8* 5.4*  5.4* 5.2   < > " 5.1   < > 3.6   CHLORIDE mmol/L 104 103 103  103 99 103 102  102 104   < > 100   < > 114*   CO2 mmol/L 21.0* 19.0* 18.0*  18.0* 18.0* 19.0* 18.0*  18.0* 18.0*   < > 16.0*   < > 23.0   BUN mg/dL 31* 31* 41*  39* 51* 64* 64*  64* 58*   < > 53*   < > 30*   CREATININE mg/dL 2.45* 2.58* 3.34*  2.30* 4.40* 5.45* 5.85*  5.85* 5.32*   < > 4.34*   < > 2.20*   GLUCOSE mg/dL 78 77 172*  175* 72 86 109*  109* 94   < > 65   < > 176*   MAGNESIUM mg/dL 2.3  --  2.2  --   --  2.0  --   --  1.9  --  1.6   PHOSPHORUS mg/dL 6.7*  --  6.9*  --   --  9.1*  --   --   --   --   --     < > = values in this interval not displayed.     CMP   Results from last 7 days   Lab Units 06/11/24  0748 06/11/24  0608 06/10/24  2335 06/10/24  1754 06/10/24  1211 06/10/24  0551 06/09/24  1824   SODIUM mmol/L 137 136 135*  135* 134* 137 137  137 138   POTASSIUM mmol/L 5.5* 5.4* 5.6*  5.6* 5.5* 5.8* 5.4*  5.4* 5.2   CHLORIDE mmol/L 104 103 103  103 99 103 102  102 104   CO2 mmol/L 21.0* 19.0* 18.0*  18.0* 18.0* 19.0* 18.0*  18.0* 18.0*   BUN mg/dL 31* 31* 41*  39* 51* 64* 64*  64* 58*   CREATININE mg/dL 2.45* 2.58* 3.34*  2.30* 4.40* 5.45* 5.85*  5.85* 5.32*   GLUCOSE mg/dL 78 77 172*  175* 72 86 109*  109* 94   ALBUMIN g/dL 3.1*  --  2.7*  --   --  2.8*  --    BILIRUBIN mg/dL  --   --   --   --   --  0.3  --    ALK PHOS U/L  --   --   --   --   --  150*  --    AST (SGOT) U/L  --   --   --   --   --  71*  --    ALT (SGPT) U/L  --   --   --   --   --  <5  --      ABG    Results from last 7 days   Lab Units 06/11/24  0404 06/10/24  1505 06/10/24  0349 06/09/24  0356 06/08/24  0400 06/07/24  0146 06/06/24 2004   PH, ARTERIAL pH units 7.244* 7.220* 7.262* 7.284* 7.370 7.387 7.435   PCO2, ARTERIAL mm Hg 47.6* 47.7* 42.2 37.5 35.1 35.6 32.0*   PO2 ART mm Hg 73.2* 78.0* 71.2* 65.6* 55.5* 66.0* 48.9*   BASE EXCESS ART mmol/L -6.5* -7.8* -7.5* -8.2* -4.6* -3.3* -2.4*          Current Facility-Administered Medications:     amLODIPine  (NORVASC) tablet 10 mg, 10 mg, Nasogastric, Q24H, Saúl Cao MD, 10 mg at 06/11/24 0809    bisoprolol (ZEBeta) tablet 2.5 mg, 2.5 mg, Nasogastric, Daily, Saúl Cao MD, 2.5 mg at 06/11/24 0809    budesonide (PULMICORT) nebulizer solution 0.5 mg, 0.5 mg, Nebulization, BID - RT, Karan Munroe APRN, 0.5 mg at 06/11/24 0805    Calcium Replacement - Follow Nurse / BPA Driven Protocol, , Does not apply, PRN, Michell Ugarte MD    castor oil-balsam peru (VENELEX) ointment 1 Application, 1 Application, Topical, Q12H, Theodore Reyes MD, 1 Application at 06/11/24 0810    chlorhexidine (PERIDEX) 0.12 % solution 15 mL, 15 mL, Mouth/Throat, Q12H, Michell Ugarte MD, 15 mL at 06/11/24 0810    dextrose (D50W) (25 g/50 mL) IV injection 10-50 mL, 10-50 mL, Intravenous, Q15 Min PRN, Karan Munroe APRN, 50 mL at 06/10/24 2315    diazePAM (VALIUM) tablet 5 mg, 5 mg, Nasogastric, Daily PRN, Saúl Cao MD    [Held by provider] Enoxaparin Sodium (LOVENOX) syringe 30 mg, 30 mg, Subcutaneous, Daily, Theodore Reyes MD, 30 mg at 06/07/24 0820    famotidine (PEPCID) injection 20 mg, 20 mg, Intravenous, Daily, Michell Ugarte MD, 20 mg at 06/11/24 0810    fentaNYL (SUBLIMAZE) bolus from bag 10 mcg/mL injection 50 mcg, 50 mcg, Intravenous, Q30 Min PRN, Scott Perkins MD, 50 mcg at 06/11/24 0830    fentaNYL 2500 mcg/250 mL NS infusion,  mcg/hr, Intravenous, Titrated, Scott Perkins MD, Last Rate: 15 mL/hr at 06/11/24 0829, 150 mcg/hr at 06/11/24 0829    glucagon (GLUCAGEN) injection 1 mg, 1 mg, Intramuscular, Q15 Min PRN, Karan Munroe APRN    heparin (porcine) injection 1,000-2,000 Units, 1,000-2,000 Units, Intravenous, PRN, Serafin Junior MD    HYDROcodone-acetaminophen (NORCO) 5-325 MG per tablet 1 tablet, 1 tablet, Nasogastric, Q4H PRN, Saúl Cao MD, 1 tablet at 06/08/24 0424    insulin regular (humuLIN R,novoLIN R) injection 2-9 Units, 2-9 Units,  Subcutaneous, Q6H, Karan Munroe, APRN, 2 Units at 06/06/24 1738    ipratropium-albuterol (DUO-NEB) nebulizer solution 3 mL, 3 mL, Nebulization, Q4H PRN, Yesenia Osorio, APRN, 3 mL at 06/06/24 1656    ipratropium-albuterol (DUO-NEB) nebulizer solution 3 mL, 3 mL, Nebulization, Q6H - RT, Karan Munroe, APRN, 3 mL at 06/11/24 0805    levothyroxine (SYNTHROID, LEVOTHROID) tablet 100 mcg, 100 mcg, Nasogastric, Q AM, Saúl Cao MD, 100 mcg at 06/11/24 0549    Magnesium Low Dose Replacement - Follow Nurse / BPA Driven Protocol, , Does not apply, PRN, Michell Ugarte MD    metoclopramide (REGLAN) injection 5 mg, 5 mg, Intravenous, Q8H, Theodore Reyes MD, 5 mg at 06/11/24 0550    morphine injection 1 mg, 1 mg, Intravenous, Q4H PRN **AND** naloxone (NARCAN) injection 0.4 mg, 0.4 mg, Intravenous, Q5 Min PRN, Saúl Cao MD    niCARdipine (CARDENE) 50 mg in sodium chloride 0.9 % 250 mL IVPB, 5-15 mg/hr, Intravenous, Titrated, UniversJunior MD    nitroglycerin (NITROSTAT) SL tablet 0.4 mg, 0.4 mg, Sublingual, Q5 Min PRN, Theodore Reyes MD    ondansetron (ZOFRAN) injection 4 mg, 4 mg, Intravenous, Q6H PRN, Michell Ugarte MD    Phosphorus Replacement - Follow Nurse / BPA Driven Protocol, , Does not apply, PRN, Michell Ugarte MD    Phoxillum BK4/2.5 dialysis solution, 1,000 mL/hr, CRRT, Continuous, Serafin Junior MD, Last Rate: 1,000 mL/hr at 06/11/24 0848, 1,000 mL/hr at 06/11/24 0848    Phoxillum BK4/2.5 dialysis solution, 1,000 mL/hr, CRRT, Continuous, Serafin Junior MD, Last Rate: 1,000 mL/hr at 06/11/24 0847, 1,000 mL/hr at 06/11/24 0847    Phoxillum BK4/2.5 dialysis solution, 1,000 mL/hr, CRRT, Continuous, Serafin Junior MD, Last Rate: 1,000 mL/hr at 06/11/24 0848, 1,000 mL/hr at 06/11/24 0848    polyethylene glycol (MIRALAX) packet 17 g, 17 g, Nasogastric, Daily, Saúl Cao MD, 17 g at 06/11/24 0810    polyvinyl alcohol (LIQUIFILM) 1.4 % ophthalmic  solution 2 drop, 2 drop, Both Eyes, Q1H PRN, Michell Ugarte MD    Potassium Replacement - Follow Nurse / BPA Driven Protocol, , Does not apply, PRTorito LOPEZ Donna C, MD    ProSource No Carb oral solution 30 mL, 30 mL, Nasogastric, BID, Stephane Morales MD, 30 mL at 06/11/24 0813    sertraline (ZOLOFT) tablet 200 mg, 200 mg, Nasogastric, Daily, Saúl Cao MD, 200 mg at 06/11/24 0809    sodium chloride 0.9 % flush 10 mL, 10 mL, Intravenous, Q12H, Michell Ugarte MD, 10 mL at 06/11/24 0810    sodium chloride 0.9 % flush 10 mL, 10 mL, Intravenous, PRN, Michell Ugarte MD    sodium chloride 0.9 % infusion 40 mL, 40 mL, Intravenous, PRN, Michlel Ugarte MD     Assessment & Plan       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  Large-bore bilateral femoral sheath placement for endovascular graft deployment.  Right 22 [place]onian and left 12 [place]  3.  Bifurcated endovascular repair of aortic aneurysm extending into the right external iliac artery and left common iliac artery  - 6/5/24 (Eric):  1.  Left renal distal inferior artery embolization for hemorrhage  2.  Graftmaster covered stent placement for distal renal artery thrombus versus dissection     - Labs reviewed: Hgb 8.2 --> 8.7, received 7units pRBC 6/5, required 1 unit pRBC 6/9  - CXR reviewed: slightly improved bilateral pleural effusions and bibasilar atelectasis, L>R  - SBP recommendations <160  - hold aspirin, lovenox ppx  - continue IV fluids  - tube feedings held due to residuals, leave NG in place  - corpack for distal feeding  - Reglan 5mg TID to continue until able to tolerate tube  feedings  - Pulm/CC following, appreciate assistance   - nephrology following for worsening renal function, 2 hour dialysis 6/8, unsuccessful dialysis 6/10, CRRT started      Patient's case was reviewed in detail with Dr. Reyes who directed the above plan of care. Plan reviewed with nursing at bedside who verbalized understanding and agreement.   Ani Bergeron PA-C  06/11/24  08:58 EDT

## 2024-06-11 NOTE — PLAN OF CARE
Problem: Adult Inpatient Plan of Care  Goal: Plan of Care Review  Outcome: Ongoing, Progressing  Flowsheets (Taken 6/11/2024 1756)  Outcome Evaluation: Pt continues on mechanical ventilation/sedation.  Fentanyl @ 250, FiO2 @ 65, PEEP @ 14.  Pt will follow commands @ times.  CRRT continues, UF of 150 since 1200 per nephrology.  Tolerating well.  Filter change x1 this afternoon.  Rios removed per nephrology.  NGT remains in place per vascular surgery, gastric residual 575 this afternoon, 250 mL returned and TF held x 1 hour.  Pt had bm x2 this morning.  Blood glucose low this afternoon, D5 @ 50 initiated after PRN dose of D50.  Family updated at bedside.   Goal: Patient-Specific Goal (Individualized)  Outcome: Ongoing, Progressing  Goal: Absence of Hospital-Acquired Illness or Injury  Outcome: Ongoing, Progressing  Intervention: Identify and Manage Fall Risk  Recent Flowsheet Documentation  Taken 6/11/2024 1600 by Amy Abebe, RN  Safety Promotion/Fall Prevention:   activity supervised   clutter free environment maintained   fall prevention program maintained   nonskid shoes/slippers when out of bed   room organization consistent   safety round/check completed  Taken 6/11/2024 1400 by Amy Abebe, RN  Safety Promotion/Fall Prevention:   activity supervised   clutter free environment maintained   fall prevention program maintained   nonskid shoes/slippers when out of bed   room organization consistent   safety round/check completed  Taken 6/11/2024 1200 by Amy Abebe, RN  Safety Promotion/Fall Prevention:   activity supervised   clutter free environment maintained   fall prevention program maintained   nonskid shoes/slippers when out of bed   room organization consistent   safety round/check completed  Taken 6/11/2024 1000 by Amy Abebe, RN  Safety Promotion/Fall Prevention:   activity supervised   clutter free environment maintained   fall prevention program  maintained   nonskid shoes/slippers when out of bed   room organization consistent   safety round/check completed  Taken 6/11/2024 0800 by Amy Abebe RN  Safety Promotion/Fall Prevention:   activity supervised   clutter free environment maintained   fall prevention program maintained   nonskid shoes/slippers when out of bed   room organization consistent   safety round/check completed  Intervention: Prevent Skin Injury  Recent Flowsheet Documentation  Taken 6/11/2024 1600 by Amy Abebe RN  Body Position:   turned   right   lower extremity elevated   upper extremity elevated  Skin Protection:   adhesive use limited   incontinence pads utilized   tubing/devices free from skin contact   skin-to-device areas padded  Taken 6/11/2024 1400 by Amy Abebe RN  Body Position:   turned   lower extremity elevated   neutral body alignment   neutral head position   upper extremity elevated  Skin Protection:   adhesive use limited   incontinence pads utilized   tubing/devices free from skin contact   skin-to-device areas padded  Taken 6/11/2024 1200 by Amy Abebe RN  Body Position:   turned   left   lower extremity elevated   upper extremity elevated  Skin Protection:   adhesive use limited   incontinence pads utilized   tubing/devices free from skin contact   skin-to-device areas padded  Taken 6/11/2024 1000 by Amy Abebe RN  Body Position:   turned   lower extremity elevated   neutral body alignment   neutral head position   upper extremity elevated  Skin Protection:   adhesive use limited   incontinence pads utilized   tubing/devices free from skin contact   skin-to-device areas padded  Taken 6/11/2024 0800 by Amy Abebe RN  Body Position:   turned   right   lower extremity elevated   upper extremity elevated  Skin Protection:   adhesive use limited   incontinence pads utilized   tubing/devices free from skin contact   skin-to-device areas  padded  Intervention: Prevent and Manage VTE (Venous Thromboembolism) Risk  Recent Flowsheet Documentation  Taken 6/11/2024 1600 by Amy Abebe RN  Activity Management: bedrest  VTE Prevention/Management:   bilateral   sequential compression devices on  Range of Motion: ROM (range of motion) performed  Taken 6/11/2024 1400 by Amy Abebe RN  Activity Management: bedrest  Range of Motion: ROM (range of motion) performed  Taken 6/11/2024 1200 by Amy Abebe RN  Activity Management: bedrest  Range of Motion: ROM (range of motion) performed  Taken 6/11/2024 1000 by Amy Abebe RN  Activity Management: bedrest  Range of Motion: ROM (range of motion) performed  Taken 6/11/2024 0800 by Amy Abebe RN  Activity Management: bedrest  VTE Prevention/Management:   bilateral   sequential compression devices on  Range of Motion: ROM (range of motion) performed  Intervention: Prevent Infection  Recent Flowsheet Documentation  Taken 6/11/2024 1400 by Amy Abebe RN  Infection Prevention:   environmental surveillance performed   visitors restricted/screened   single patient room provided   rest/sleep promoted   personal protective equipment utilized  Taken 6/11/2024 1200 by Amy Abebe RN  Infection Prevention:   environmental surveillance performed   visitors restricted/screened   single patient room provided   rest/sleep promoted   personal protective equipment utilized  Taken 6/11/2024 1000 by Amy Abebe RN  Infection Prevention:   environmental surveillance performed   visitors restricted/screened   single patient room provided   rest/sleep promoted   personal protective equipment utilized  Taken 6/11/2024 0800 by Amy Abebe RN  Infection Prevention:   environmental surveillance performed   visitors restricted/screened   rest/sleep promoted   single patient room provided   personal protective equipment  utilized  Goal: Optimal Comfort and Wellbeing  Outcome: Ongoing, Progressing  Intervention: Provide Person-Centered Care  Recent Flowsheet Documentation  Taken 6/11/2024 1600 by Amy Abebe RN  Trust Relationship/Rapport:   care explained   reassurance provided  Taken 6/11/2024 1200 by Amy Abebe RN  Trust Relationship/Rapport:   care explained   reassurance provided  Taken 6/11/2024 0800 by Amy Abebe RN  Trust Relationship/Rapport:   care explained   reassurance provided  Goal: Readiness for Transition of Care  Outcome: Ongoing, Progressing     Problem: Communication Impairment (Mechanical Ventilation, Invasive)  Goal: Effective Communication  Outcome: Ongoing, Progressing  Intervention: Ensure Effective Communication  Recent Flowsheet Documentation  Taken 6/11/2024 1400 by Amy Abebe RN  Communication Enhancement Strategies: extra time allowed for response  Taken 6/11/2024 1200 by Amy Abebe RN  Communication Enhancement Strategies: extra time allowed for response  Taken 6/11/2024 1000 by Amy Abebe RN  Communication Enhancement Strategies: extra time allowed for response  Taken 6/11/2024 0800 by Amy Abebe RN  Communication Enhancement Strategies: extra time allowed for response     Problem: Device-Related Complication Risk (Mechanical Ventilation, Invasive)  Goal: Optimal Device Function  Outcome: Ongoing, Progressing  Intervention: Optimize Device Care and Function  Recent Flowsheet Documentation  Taken 6/11/2024 1200 by Amy Abebe RN  Airway Safety Measures:   suction at bedside   oxygen flowmeter at bedside   manual resuscitator/mask at bedside  Taken 6/11/2024 0800 by Amy Abebe, RN  Airway Safety Measures:   suction at bedside   oxygen flowmeter at bedside   manual resuscitator/mask at bedside     Problem: Inability to Wean (Mechanical Ventilation, Invasive)  Goal: Mechanical  Ventilation Liberation  Outcome: Ongoing, Progressing  Intervention: Promote Extubation and Mechanical Ventilation Liberation  Recent Flowsheet Documentation  Taken 6/11/2024 1600 by Amy Abebe RN  Medication Review/Management: medications reviewed  Taken 6/11/2024 1400 by Amy Abebe RN  Medication Review/Management: medications reviewed  Taken 6/11/2024 1200 by Amy Abebe RN  Medication Review/Management: medications reviewed  Taken 6/11/2024 1000 by Amy Abebe RN  Medication Review/Management: medications reviewed  Taken 6/11/2024 0800 by Amy Abebe RN  Medication Review/Management: medications reviewed     Problem: Nutrition Impairment (Mechanical Ventilation, Invasive)  Goal: Optimal Nutrition Delivery  Outcome: Ongoing, Progressing     Problem: Skin and Tissue Injury (Mechanical Ventilation, Invasive)  Goal: Absence of Device-Related Skin and Tissue Injury  Outcome: Ongoing, Progressing  Intervention: Maintain Skin and Tissue Health  Recent Flowsheet Documentation  Taken 6/11/2024 1600 by Amy Abebe RN  Device Skin Pressure Protection:   absorbent pad utilized/changed   adhesive use limited   positioning supports utilized   pressure points protected  Taken 6/11/2024 1400 by Amy Abebe RN  Device Skin Pressure Protection:   absorbent pad utilized/changed   adhesive use limited   positioning supports utilized   pressure points protected  Taken 6/11/2024 1200 by Amy Abebe RN  Device Skin Pressure Protection:   absorbent pad utilized/changed   adhesive use limited   positioning supports utilized   pressure points protected  Taken 6/11/2024 1000 by Amy Abebe RN  Device Skin Pressure Protection:   absorbent pad utilized/changed   adhesive use limited   positioning supports utilized   pressure points protected  Taken 6/11/2024 0800 by Amy Abebe RN  Device Skin  Pressure Protection:   absorbent pad utilized/changed   adhesive use limited   positioning supports utilized   pressure points protected     Problem: Ventilator-Induced Lung Injury (Mechanical Ventilation, Invasive)  Goal: Absence of Ventilator-Induced Lung Injury  Outcome: Ongoing, Progressing  Intervention: Prevent Ventilator-Associated Pneumonia  Recent Flowsheet Documentation  Taken 6/11/2024 1600 by Amy Abebe RN  [facility] of Bed ([facility]) Positioning: [facility] at 30-45 degrees  Oral Care:   teeth brushed - suction toothbrush   suction provided   swabbed with antiseptic solution   lip/mouth moisturizer applied  Taken 6/11/2024 1400 by Amy Abebe RN  Head of Bed ([facility]) Positioning: [facility] at 30 degrees  Oral Care:   teeth brushed - suction toothbrush   swabbed with antiseptic solution   suction provided   lip/mouth moisturizer applied  Taken 6/11/2024 1200 by Amy Abebe RN  Head of Bed ([facility]) Positioning: [facility] at 30 degrees  Oral Care:   teeth brushed - suction toothbrush   swabbed with antiseptic solution   suction provided   lip/mouth moisturizer applied  Taken 6/11/2024 1000 by Amy Abebe RN  Head of Bed ([facility]) Positioning: [facility] at 30 degrees  Taken 6/11/2024 0800 by Amy Abebe RN  Head of Bed ([facility]) Positioning: [facility] at 30 degrees  VAP Prevention Bundle:   HOB elevation maintained   oral care regularly provided   VTE prophylaxis provided   vent circuit breaks minimized   stress ulcer prophylaxis provided  VAP Prevention Contraindications: condition unstable  VAP Prevention Measures: completed  Oral Care:   teeth brushed - suction toothbrush   swabbed with antiseptic solution   suction provided   lip/mouth moisturizer applied  Intervention: Facilitate Lung-Protection Measures  Recent Flowsheet Documentation  Taken 6/11/2024 0800 by Amy Abebe RN  Lung Protection Measures:   fluid excess minimized   low inspiratory pressure provided   low tidal  volume provided   lung compliance monitored   optimal PEEP applied   plateau/inspiratory pressure monitored   ventilator synchrony promoted   ventilator waveforms monitored     Problem: Fall Injury Risk  Goal: Absence of Fall and Fall-Related Injury  Outcome: Ongoing, Progressing  Intervention: Identify and Manage Contributors  Recent Flowsheet Documentation  Taken 6/11/2024 1600 by Amy Abebe, RN  Medication Review/Management: medications reviewed  Taken 6/11/2024 1400 by Amy Abebe, RN  Medication Review/Management: medications reviewed  Taken 6/11/2024 1200 by Amy Abebe, RN  Medication Review/Management: medications reviewed  Taken 6/11/2024 1000 by Amy Abebe, RN  Medication Review/Management: medications reviewed  Taken 6/11/2024 0800 by Amy Abebe, RN  Medication Review/Management: medications reviewed  Intervention: Promote Injury-Free Environment  Recent Flowsheet Documentation  Taken 6/11/2024 1600 by Amy Abebe, RN  Safety Promotion/Fall Prevention:   activity supervised   clutter free environment maintained   fall prevention program maintained   nonskid shoes/slippers when out of bed   room organization consistent   safety round/check completed  Taken 6/11/2024 1400 by Amy Abebe, RN  Safety Promotion/Fall Prevention:   activity supervised   clutter free environment maintained   fall prevention program maintained   nonskid shoes/slippers when out of bed   room organization consistent   safety round/check completed  Taken 6/11/2024 1200 by Amy Abebe, RN  Safety Promotion/Fall Prevention:   activity supervised   clutter free environment maintained   fall prevention program maintained   nonskid shoes/slippers when out of bed   room organization consistent   safety round/check completed  Taken 6/11/2024 1000 by Amy Abebe, RN  Safety Promotion/Fall Prevention:   activity supervised    clutter free environment maintained   fall prevention program maintained   nonskid shoes/slippers when out of bed   room organization consistent   safety round/check completed  Taken 6/11/2024 0800 by Amy Abebe RN  Safety Promotion/Fall Prevention:   activity supervised   clutter free environment maintained   fall prevention program maintained   nonskid shoes/slippers when out of bed   room organization consistent   safety round/check completed     Problem: Skin Injury Risk Increased  Goal: Skin Health and Integrity  Outcome: Ongoing, Progressing  Intervention: Optimize Skin Protection  Recent Flowsheet Documentation  Taken 6/11/2024 1600 by Amy Abebe RN  Pressure Reduction Techniques:   heels elevated off bed   pressure points protected   weight shift assistance provided  Head of Bed (HOB) Positioning: HOB at 30-45 degrees  Pressure Reduction Devices:   alternating pressure pump (ADD)   elbow protectors utilized   specialty bed utilized   pressure-redistributing mattress utilized   positioning supports utilized   heel offloading device utilized  Skin Protection:   adhesive use limited   incontinence pads utilized   tubing/devices free from skin contact   skin-to-device areas padded  Taken 6/11/2024 1400 by Amy Abebe RN  Pressure Reduction Techniques:   heels elevated off bed   pressure points protected   weight shift assistance provided  Head of Bed (HOB) Positioning: HOB at 30 degrees  Pressure Reduction Devices:   alternating pressure pump (ADD)   elbow protectors utilized   specialty bed utilized   pressure-redistributing mattress utilized   positioning supports utilized   heel offloading device utilized  Skin Protection:   adhesive use limited   incontinence pads utilized   tubing/devices free from skin contact   skin-to-device areas padded  Taken 6/11/2024 1200 by Amy Abebe RN  Pressure Reduction Techniques:   frequent weight shift encouraged    heels elevated off bed   pressure points protected   weight shift assistance provided  Head of Bed (HOB) Positioning: HOB at 30 degrees  Pressure Reduction Devices:   alternating pressure pump (ADD)   elbow protectors utilized   specialty bed utilized   pressure-redistributing mattress utilized   positioning supports utilized   heel offloading device utilized  Skin Protection:   adhesive use limited   incontinence pads utilized   tubing/devices free from skin contact   skin-to-device areas padded  Taken 6/11/2024 1000 by Amy Abebe RN  Pressure Reduction Techniques:   frequent weight shift encouraged   heels elevated off bed   pressure points protected   weight shift assistance provided  Head of Bed (HOB) Positioning: HOB at 30 degrees  Pressure Reduction Devices:   alternating pressure pump (ADD)   elbow protectors utilized   specialty bed utilized   pressure-redistributing mattress utilized   positioning supports utilized   heel offloading device utilized  Skin Protection:   adhesive use limited   incontinence pads utilized   tubing/devices free from skin contact   skin-to-device areas padded  Taken 6/11/2024 0800 by Amy Abebe RN  Pressure Reduction Techniques:   heels elevated off bed   pressure points protected   weight shift assistance provided  Head of Bed (HOB) Positioning: HOB at 30 degrees  Pressure Reduction Devices:   alternating pressure pump (ADD)   elbow protectors utilized   specialty bed utilized   pressure-redistributing mattress utilized   positioning supports utilized   heel offloading device utilized  Skin Protection:   adhesive use limited   incontinence pads utilized   tubing/devices free from skin contact   skin-to-device areas padded     Problem: Hypertension Comorbidity  Goal: Blood Pressure in Desired Range  Outcome: Ongoing, Progressing  Intervention: Maintain Blood Pressure Management  Recent Flowsheet Documentation  Taken 6/11/2024 1600 by Amy Abebe  Lisa, DEBBIE  Medication Review/Management: medications reviewed  Taken 6/11/2024 1400 by Amy Abebe, RN  Medication Review/Management: medications reviewed  Taken 6/11/2024 1200 by Amy Abebe, RN  Medication Review/Management: medications reviewed  Taken 6/11/2024 1000 by Amy Abebe RN  Medication Review/Management: medications reviewed  Taken 6/11/2024 0800 by Amy Abebe RN  Medication Review/Management: medications reviewed     Problem: Device-Related Complication Risk (Hemodialysis)  Goal: Safe, Effective Therapy Delivery  Outcome: Ongoing, Progressing  Intervention: Optimize Device Care and Function  Recent Flowsheet Documentation  Taken 6/11/2024 1600 by Amy Abebe, RN  Medication Review/Management: medications reviewed  Taken 6/11/2024 1400 by Amy Abebe RN  Medication Review/Management: medications reviewed  Taken 6/11/2024 1200 by Amy Abebe RN  Medication Review/Management: medications reviewed  Taken 6/11/2024 1000 by Amy Abebe RN  Medication Review/Management: medications reviewed  Taken 6/11/2024 0800 by Amy Abebe RN  Medication Review/Management: medications reviewed     Problem: Hemodynamic Instability (Hemodialysis)  Goal: Effective Tissue Perfusion  Outcome: Ongoing, Progressing     Problem: Infection (Hemodialysis)  Goal: Absence of Infection Signs and Symptoms  Outcome: Ongoing, Progressing   Goal Outcome Evaluation:              Outcome Evaluation: Pt continues on mechanical ventilation/sedation.  Fentanyl @ 250, FiO2 @ 65, PEEP @ 14.  CRRT continues, UF of 150 since 1200 per nephrology.  Tolerating well.  Filter change x1 this afternoon.  Rios removed per nephrology.  NGT remains in place per vascular surgery, gastric residual 575 this afternoon, 250 mL returned and TF held x 1 hour.  Pt had bm x2 this morning.  Blood glucose low this afternoon, D5 @ 50 initiated  after PRN dose of D50.  Family updated at bedside.

## 2024-06-11 NOTE — PROGRESS NOTES
" LOS: 6 days    Patient Care Team:  Frannie Couch MD as PCP - General    Reason For Visit: Acute renal failure.     Subjective     Chart reviewed.  Other physician notes seen.  CRRT in progress at this time.  Patient remained critically ill in ICU.  Remain on ventilator.  UF tolerating 100 mL/h.      Hypertriglyceridemia noted ??  Could be due to propofol.     Objective     amLODIPine, 10 mg, Nasogastric, Q24H  bisoprolol, 2.5 mg, Nasogastric, Daily  budesonide, 0.5 mg, Nebulization, BID - RT  castor oil-balsam peru, 1 Application, Topical, Q12H  chlorhexidine, 15 mL, Mouth/Throat, Q12H  [Held by provider] enoxaparin, 30 mg, Subcutaneous, Daily  famotidine, 20 mg, Intravenous, Daily  insulin regular, 2-9 Units, Subcutaneous, Q6H  ipratropium-albuterol, 3 mL, Nebulization, Q6H - RT  levothyroxine, 100 mcg, Nasogastric, Q AM  metoclopramide, 5 mg, Intravenous, Q8H  polyethylene glycol, 17 g, Nasogastric, Daily  ProSource No Carb, 30 mL, Nasogastric, BID  sertraline, 200 mg, Nasogastric, Daily  sodium chloride, 10 mL, Intravenous, Q12H      fentanyl 10 mcg/mL,  mcg/hr, Last Rate: 175 mcg/hr (06/11/24 1007)  niCARdipine, 5-15 mg/hr  Phoxillum BK4/2.5, 1,000 mL/hr, Last Rate: 1,000 mL/hr (06/11/24 0848)  Phoxillum BK4/2.5, 1,000 mL/hr, Last Rate: 1,000 mL/hr (06/11/24 0847)  Phoxillum BK4/2.5, 1,000 mL/hr, Last Rate: 1,000 mL/hr (06/11/24 0848)        Vital Signs:  Blood pressure 134/49, pulse 79, temperature 97.2 °F (36.2 °C), temperature source Bladder, resp. rate 19, height 168.9 cm (66.5\"), weight 81.7 kg (180 lb 1.9 oz), SpO2 94%.    Flowsheet Rows      Flowsheet Row First Filed Value   Admission Height 168.9 cm (66.5\") Documented at 06/05/2024 0740   Admission Weight 65.4 kg (144 lb 1.1 oz) Documented at 06/05/2024 0740            06/10 0701 - 06/11 0700  In: 2160.8 [I.V.:437.8]  Out: 3426 [Urine:18]    Physical Exam:  GENERAL: Intubated sedated on ventilator  HEENT: Normocephalic, atraumatic.    NECK: " Restricted due to intubation  HEART: RRR; No murmur, rubs, gallops.  Positive bilateral lower extremity and upper extremity edema  LUNGS: Mechanical breath sounds.   ABDOMEN: Soft, nontender, nondistended..  EXT: 2-3+ bilateral lower extremity edema  SKIN: Warm and dry without rash  NEURO: Unable to assess  PSYCHIATRIC: Unable to assess    Labs:  Results from last 7 days   Lab Units 06/11/24  0608 06/10/24  2335 06/10/24  1754   WBC 10*3/mm3 7.98 7.18 6.89   HEMOGLOBIN g/dL 8.7* 8.2* 8.3*   HEMATOCRIT % 26.7* 25.8* 25.1*   PLATELETS 10*3/mm3 94* 81* 84*     Results from last 7 days   Lab Units 06/11/24  0748 06/11/24  0608 06/10/24  2335 06/10/24  1754 06/10/24  1211 06/10/24  0551 06/09/24  1207 06/09/24  0432   SODIUM mmol/L 137 136 135*  135* 134*   < > 137  137   < > 133*   POTASSIUM mmol/L 5.5* 5.4* 5.6*  5.6* 5.5*   < > 5.4*  5.4*   < > 5.1   CHLORIDE mmol/L 104 103 103  103 99   < > 102  102   < > 100   CO2 mmol/L 21.0* 19.0* 18.0*  18.0* 18.0*   < > 18.0*  18.0*   < > 16.0*   BUN mg/dL 31* 31* 41*  39* 51*   < > 64*  64*   < > 53*   CREATININE mg/dL 2.45* 2.58* 3.34*  2.30* 4.40*   < > 5.85*  5.85*   < > 4.34*   CALCIUM mg/dL 8.3* 8.2* 7.5*  7.9* 7.7*   < > 7.8*  7.8*   < > 7.3*   PHOSPHORUS mg/dL 6.7*  --  6.9*  --   --  9.1*  --   --    MAGNESIUM mg/dL 2.3  --  2.2  --   --  2.0  --  1.9   ALBUMIN g/dL 3.1*  --  2.7*  --   --  2.8*  --   --     < > = values in this interval not displayed.     Results from last 7 days   Lab Units 06/11/24  0748   GLUCOSE mg/dL 78       Results from last 7 days   Lab Units 06/10/24  0551   ALK PHOS U/L 150*   BILIRUBIN mg/dL 0.3   ALT (SGPT) U/L <5   AST (SGOT) U/L 71*     Results from last 7 days   Lab Units 06/11/24  0404   PH, ARTERIAL pH units 7.244*   PO2 ART mm Hg 73.2*   PCO2, ARTERIAL mm Hg 47.6*   HCO3 ART mmol/L 20.6         Estimated Creatinine Clearance: 22.2 mL/min (A) (by C-G formula based on SCr of 2.45 mg/dL (H)).    Chest  Xray:    IMPRESSION:  Impression:  Worsening small right-sided pleural effusion with probable overlying atelectasis. Mild retraction of the endotracheal tube with the tip in the proximal to mid trachea     CT abdomen:   Impression:  1. Left renal artery stent with high-grade stenosis just distal to the stent, possibly from a dissection flap.  2. Large active extravasation from the mid pole left renal cortex with a large pararenal and retroperitoneal hematoma.   3. Left lower lobe atelectasis, which was noted at time of dictation. These findings were being communicated to the OR.  4. Abdominal aortic aneurysm status post endograft repair.    Assessment   Acute renal failure on CKD stage III:   - Baseline renal function 1.4-1.5.  Patient follows up with Dr. Brown.  - Right atrophic kidney.  Left renal hemorrhage s/p distal inferior branch embolization 6/5.   Abdominal aortic aneurysm repair 6/5  Left renal hemorrhage s/p distal inferior branch embolization  Right atrophic kidney  Acute blood loss anemia  Hypertension   Dyslipidemia  Coronary artery disease    PLAN:  Dialysis: CRRT in progress.   mL/h.   Hemodialysis initiated on 6/8.  CRRT started 6/11/2024  High risk complex patient multiple medical problems  - Strict ROBERTO's.   - Dose meds to GFR   - Avoid nephrotoxins     Metabolic acidosis: Managed with CRRT    Acute blood loss anemia:   -S/p 8 units of PRBC   -Transfuse as needed to keep hemoglobin above 7.      Abdominal aortic aneurysm repair 6/5:   -Vascular following.      Hypertension:   -S/p Cardene drip.  Now off.  Blood pressure control.    Case discussed with RN and the daughter in the room.    Riaz Siu MD  06/11/24  11:33 EDT

## 2024-06-11 NOTE — PLAN OF CARE
Goal Outcome Evaluation:  Plan of Care Reviewed With: patient        Progress:   Outcome Evaluation:   -Pt continues on CRRT, tolerating UF of 100 from 2300-on.  -Not following commands, pupils equal  -VSS.  MAPs > 65.  -Remains intubated (FiO2 65%, PEEP 14) and sedated (Fent gtt @ 100)  -D50 given x1 for BG in the 50s  -TF increased to goal of 35mL/hr  -UOP 18 mL, No BM

## 2024-06-12 NOTE — PLAN OF CARE
Problem: Adult Inpatient Plan of Care  Goal: Plan of Care Review  Outcome: Ongoing, Progressing  Flowsheets (Taken 6/12/2024 1848)  Outcome Evaluation: Pt continues mechanical ventilation, FiO2 @ 85, PEEP @ 14.  Attempted to wean FiO2 to 65% per MD, pt tolerated for ~45 minutes before requiring FiO2 to increase back to 85%.  Peak pressure remain high, mid 30's-40's.  Pt is pulling volumes, occassionally stacking breathes, fentanyl bolus's and versed utilized.  Fentanyl infusing @ 300.  Pt opens eyes to touch/sound and withdraws from pain, but not following commands.  Pt on levophed most of day, weaned off this evening, UF of 50 most of the day.  Filter change x1 due to clotting, sq heparin started and CRRT settings adjusted.  Pt continues with increased residuals and abdominal distention.  TF on hold, recommended by dietician to hold prosource as well.  No UOP or BM today.  Goal: Patient-Specific Goal (Individualized)  Outcome: Ongoing, Progressing  Goal: Absence of Hospital-Acquired Illness or Injury  Outcome: Ongoing, Progressing  Intervention: Identify and Manage Fall Risk  Recent Flowsheet Documentation  Taken 6/12/2024 1800 by Amy Abebe, RN  Safety Promotion/Fall Prevention:   activity supervised   clutter free environment maintained   fall prevention program maintained   nonskid shoes/slippers when out of bed   room organization consistent   safety round/check completed  Taken 6/12/2024 1600 by Amy Abebe, RN  Safety Promotion/Fall Prevention:   activity supervised   clutter free environment maintained   fall prevention program maintained   nonskid shoes/slippers when out of bed   room organization consistent   safety round/check completed  Taken 6/12/2024 1400 by Amy Abebe, RN  Safety Promotion/Fall Prevention:   activity supervised   clutter free environment maintained   fall prevention program maintained   nonskid shoes/slippers when out of bed   room  organization consistent   safety round/check completed  Taken 6/12/2024 1200 by Amy Abebe RN  Safety Promotion/Fall Prevention:   activity supervised   clutter free environment maintained   fall prevention program maintained   nonskid shoes/slippers when out of bed   room organization consistent   safety round/check completed  Taken 6/12/2024 1000 by Amy Abebe RN  Safety Promotion/Fall Prevention:   activity supervised   clutter free environment maintained   fall prevention program maintained   nonskid shoes/slippers when out of bed   room organization consistent   safety round/check completed  Taken 6/12/2024 0800 by Amy Abebe RN  Safety Promotion/Fall Prevention:   activity supervised   clutter free environment maintained   fall prevention program maintained   nonskid shoes/slippers when out of bed   room organization consistent   safety round/check completed  Intervention: Prevent Skin Injury  Recent Flowsheet Documentation  Taken 6/12/2024 1800 by Amy Abebe RN  Body Position:   turned   lower extremity elevated   neutral body alignment   neutral head position   upper extremity elevated  Skin Protection:   adhesive use limited   incontinence pads utilized   tubing/devices free from skin contact   skin sealant/moisture barrier applied  Taken 6/12/2024 1600 by Amy Abebe RN  Body Position:   turned   left   lower extremity elevated   upper extremity elevated  Skin Protection:   adhesive use limited   incontinence pads utilized   tubing/devices free from skin contact   skin-to-device areas padded  Taken 6/12/2024 1400 by Amy Abebe RN  Body Position:   turned   lower extremity elevated   neutral body alignment   neutral head position   upper extremity elevated  Skin Protection:   adhesive use limited   incontinence pads utilized   tubing/devices free from skin contact   skin-to-device areas padded  Taken 6/12/2024 1200 by  Amy Abebe RN  Body Position:   turned   left   lower extremity elevated   upper extremity elevated  Skin Protection:   adhesive use limited   incontinence pads utilized   tubing/devices free from skin contact   skin-to-device areas padded  Taken 6/12/2024 1000 by Amy Abebe RN  Body Position:   turned   lower extremity elevated   neutral body alignment   neutral head position   upper extremity elevated  Skin Protection:   adhesive use limited   incontinence pads utilized   tubing/devices free from skin contact   skin-to-device areas padded   silicone foam dressing in place  Taken 6/12/2024 0800 by Amy Abebe RN  Body Position:   turned   right   lower extremity elevated   upper extremity elevated  Skin Protection:   adhesive use limited   incontinence pads utilized   tubing/devices free from skin contact   skin-to-device areas padded   pulse oximeter probe site changed   silicone foam dressing in place   protective footwear used  Intervention: Prevent and Manage VTE (Venous Thromboembolism) Risk  Recent Flowsheet Documentation  Taken 6/12/2024 1800 by Amy Abebe RN  Activity Management: bedrest  Range of Motion: ROM (range of motion) performed  Taken 6/12/2024 1600 by Amy Abebe RN  Activity Management: bedrest  Range of Motion: ROM (range of motion) performed  Taken 6/12/2024 1400 by Amy Abebe RN  Activity Management: bedrest  Range of Motion: ROM (range of motion) performed  Taken 6/12/2024 1200 by Amy Abebe RN  Activity Management: bedrest  Range of Motion: ROM (range of motion) performed  Taken 6/12/2024 1000 by Amy Abebe RN  Activity Management: bedrest  VTE Prevention/Management:   bilateral   sequential compression devices on  Range of Motion: ROM (range of motion) performed  Taken 6/12/2024 0800 by Amy Abebe RN  Activity Management: bedrest  Range of Motion: ROM (range  of motion) performed  Intervention: Prevent Infection  Recent Flowsheet Documentation  Taken 6/12/2024 1800 by Amy Abebe, RN  Infection Prevention:   environmental surveillance performed   visitors restricted/screened   single patient room provided   rest/sleep promoted   personal protective equipment utilized  Taken 6/12/2024 1600 by Amy Abebe RN  Infection Prevention:   environmental surveillance performed   visitors restricted/screened   single patient room provided   rest/sleep promoted   personal protective equipment utilized  Taken 6/12/2024 1400 by Amy Abebe RN  Infection Prevention:   environmental surveillance performed   visitors restricted/screened   single patient room provided   rest/sleep promoted   personal protective equipment utilized  Taken 6/12/2024 1200 by Amy Abebe RN  Infection Prevention:   environmental surveillance performed   visitors restricted/screened   single patient room provided   rest/sleep promoted   personal protective equipment utilized  Taken 6/12/2024 1000 by Amy Abebe RN  Infection Prevention: environmental surveillance performed  Taken 6/12/2024 0800 by Amy Abebe RN  Infection Prevention:   environmental surveillance performed   visitors restricted/screened   single patient room provided   rest/sleep promoted   personal protective equipment utilized  Goal: Optimal Comfort and Wellbeing  Outcome: Ongoing, Progressing  Intervention: Provide Person-Centered Care  Recent Flowsheet Documentation  Taken 6/12/2024 1600 by Amy Abebe, DEBBIE  Trust Relationship/Rapport:   care explained   reassurance provided  Taken 6/12/2024 1200 by Amy Abebe RN  Trust Relationship/Rapport:   care explained   reassurance provided  Taken 6/12/2024 0800 by Amy Abebe, DEBBIE  Trust Relationship/Rapport:   care explained   reassurance provided  Goal: Readiness for Transition  of Care  Outcome: Ongoing, Progressing     Problem: Communication Impairment (Mechanical Ventilation, Invasive)  Goal: Effective Communication  Outcome: Ongoing, Progressing     Problem: Device-Related Complication Risk (Mechanical Ventilation, Invasive)  Goal: Optimal Device Function  Outcome: Ongoing, Progressing  Intervention: Optimize Device Care and Function  Recent Flowsheet Documentation  Taken 6/12/2024 1600 by Amy Abebe, RN  Airway Safety Measures:   suction at bedside   oxygen flowmeter at bedside   manual resuscitator/mask at bedside  Taken 6/12/2024 1200 by Amy Abebe, RN  Airway Safety Measures:   suction at bedside   oxygen flowmeter at bedside   manual resuscitator/mask at bedside  Taken 6/12/2024 0800 by Amy Abebe, RN  Airway Safety Measures:   manual resuscitator/mask at bedside   suction at bedside   oxygen flowmeter at bedside     Problem: Inability to Wean (Mechanical Ventilation, Invasive)  Goal: Mechanical Ventilation Liberation  Outcome: Ongoing, Progressing  Intervention: Promote Extubation and Mechanical Ventilation Liberation  Recent Flowsheet Documentation  Taken 6/12/2024 1800 by Amy bAebe, RN  Medication Review/Management: medications reviewed  Taken 6/12/2024 1600 by Amy Abebe, RN  Medication Review/Management: medications reviewed  Taken 6/12/2024 1400 by Amy Abebe, RN  Medication Review/Management: medications reviewed  Taken 6/12/2024 1200 by Amy Abbee, RN  Medication Review/Management: medications reviewed  Taken 6/12/2024 1000 by Amy Abebe, RN  Medication Review/Management: medications reviewed  Taken 6/12/2024 0800 by Amy Abebe, RN  Medication Review/Management: medications reviewed     Problem: Nutrition Impairment (Mechanical Ventilation, Invasive)  Goal: Optimal Nutrition Delivery  Outcome: Ongoing, Progressing     Problem: Skin and Tissue Injury  (Mechanical Ventilation, Invasive)  Goal: Absence of Device-Related Skin and Tissue Injury  Outcome: Ongoing, Progressing  Intervention: Maintain Skin and Tissue Health  Recent Flowsheet Documentation  Taken 6/12/2024 1800 by Amy Abebe RN  Device Skin Pressure Protection:   absorbent pad utilized/changed   adhesive use limited   positioning supports utilized   pressure points protected  Taken 6/12/2024 1600 by Amy Abebe RN  Device Skin Pressure Protection:   absorbent pad utilized/changed   adhesive use limited   positioning supports utilized   pressure points protected  Taken 6/12/2024 1400 by Amy Abebe RN  Device Skin Pressure Protection:   absorbent pad utilized/changed   adhesive use limited   positioning supports utilized   pressure points protected  Taken 6/12/2024 1200 by Amy Abebe RN  Device Skin Pressure Protection:   absorbent pad utilized/changed   adhesive use limited   positioning supports utilized   pressure points protected  Taken 6/12/2024 1000 by Amy Abebe RN  Device Skin Pressure Protection:   absorbent pad utilized/changed   adhesive use limited   positioning supports utilized   pressure points protected  Taken 6/12/2024 0800 by Amy Abebe RN  Device Skin Pressure Protection:   absorbent pad utilized/changed   adhesive use limited   positioning supports utilized   pressure points protected     Problem: Ventilator-Induced Lung Injury (Mechanical Ventilation, Invasive)  Goal: Absence of Ventilator-Induced Lung Injury  Outcome: Ongoing, Progressing  Intervention: Prevent Ventilator-Associated Pneumonia  Recent Flowsheet Documentation  Taken 6/12/2024 1800 by Amy Abebe RN  Head of Bed (HOB) Positioning: HOB at 30-45 degrees  Oral Care:   tongue brushed   swabbed with antiseptic solution   suction provided  Taken 6/12/2024 1600 by Amy Abebe RN  Head of Bed (HOB) Positioning:  [facility] at 30-45 degrees  VAP Prevention Bundle:   HOB elevation maintained   oral care regularly provided   VTE prophylaxis provided   vent circuit breaks minimized   stress ulcer prophylaxis provided   readiness to extubate assessed  VAP Prevention Contraindications: condition unstable  VAP Prevention Measures: completed  Oral Care:   tongue brushed   teeth brushed - suction toothbrush   swabbed with antiseptic solution   suction provided  Taken 6/12/2024 1400 by Amy Abebe RN  Head of Bed ([facility]) Positioning: [facility] at 30-45 degrees  Oral Care:   tongue brushed   swabbed with antiseptic solution   suction provided  Taken 6/12/2024 1200 by Amy Abebe RN  Head of Bed ([facility]) Positioning: [facility] at 30-45 degrees  Oral Care:   tongue brushed   teeth brushed - suction toothbrush   swabbed with antiseptic solution   lip/mouth moisturizer applied   suction provided  Taken 6/12/2024 1000 by Amy Abebe RN  Head of Bed ([facility]) Positioning: [facility] at 30-45 degrees  Oral Care:   tongue brushed   swabbed with antiseptic solution   suction provided  Taken 6/12/2024 0800 by Amy Abebe RN  Oral Care:   tongue brushed   teeth brushed - suction toothbrush   swabbed with antiseptic solution   suction provided   lip/mouth moisturizer applied  Intervention: Facilitate Lung-Protection Measures  Recent Flowsheet Documentation  Taken 6/12/2024 1000 by Amy Abebe RN  Lung Protection Measures:   fluid excess minimized   low inspiratory pressure provided   low tidal volume provided   lung compliance monitored   optimal PEEP applied   plateau/inspiratory pressure monitored   ventilator synchrony promoted   ventilator waveforms monitored     Problem: Fall Injury Risk  Goal: Absence of Fall and Fall-Related Injury  Outcome: Ongoing, Progressing  Intervention: Identify and Manage Contributors  Recent Flowsheet Documentation  Taken 6/12/2024 1800 by Amy Abebe RN  Medication  Review/Management: medications reviewed  Taken 6/12/2024 1600 by Amy Abebe, RN  Medication Review/Management: medications reviewed  Taken 6/12/2024 1400 by Amy Abebe, RN  Medication Review/Management: medications reviewed  Taken 6/12/2024 1200 by Amy Abebe, RN  Medication Review/Management: medications reviewed  Taken 6/12/2024 1000 by Amy Abebe, RN  Medication Review/Management: medications reviewed  Taken 6/12/2024 0800 by Amy Abebe, RN  Medication Review/Management: medications reviewed  Intervention: Promote Injury-Free Environment  Recent Flowsheet Documentation  Taken 6/12/2024 1800 by Amy Abebe, RN  Safety Promotion/Fall Prevention:   activity supervised   clutter free environment maintained   fall prevention program maintained   nonskid shoes/slippers when out of bed   room organization consistent   safety round/check completed  Taken 6/12/2024 1600 by Amy Abebe, RN  Safety Promotion/Fall Prevention:   activity supervised   clutter free environment maintained   fall prevention program maintained   nonskid shoes/slippers when out of bed   room organization consistent   safety round/check completed  Taken 6/12/2024 1400 by Amy Abebe, RN  Safety Promotion/Fall Prevention:   activity supervised   clutter free environment maintained   fall prevention program maintained   nonskid shoes/slippers when out of bed   room organization consistent   safety round/check completed  Taken 6/12/2024 1200 by Amy Abebe, RN  Safety Promotion/Fall Prevention:   activity supervised   clutter free environment maintained   fall prevention program maintained   nonskid shoes/slippers when out of bed   room organization consistent   safety round/check completed  Taken 6/12/2024 1000 by Amy Abebe, RN  Safety Promotion/Fall Prevention:   activity supervised   clutter free environment  maintained   fall prevention program maintained   nonskid shoes/slippers when out of bed   room organization consistent   safety round/check completed  Taken 6/12/2024 0800 by Amy Abebe RN  Safety Promotion/Fall Prevention:   activity supervised   clutter free environment maintained   fall prevention program maintained   nonskid shoes/slippers when out of bed   room organization consistent   safety round/check completed     Problem: Skin Injury Risk Increased  Goal: Skin Health and Integrity  Outcome: Ongoing, Progressing  Intervention: Optimize Skin Protection  Recent Flowsheet Documentation  Taken 6/12/2024 1800 by Amy Abebe RN  Pressure Reduction Techniques:   heels elevated off bed   pressure points protected   weight shift assistance provided  Head of Bed (HOB) Positioning: HOB at 30-45 degrees  Pressure Reduction Devices:   alternating pressure pump (ADD)   elbow protectors utilized   specialty bed utilized   pressure-redistributing mattress utilized   positioning supports utilized   heel offloading device utilized  Skin Protection:   adhesive use limited   incontinence pads utilized   tubing/devices free from skin contact   skin sealant/moisture barrier applied  Taken 6/12/2024 1600 by Amy Abebe RN  Pressure Reduction Techniques:   heels elevated off bed   pressure points protected   weight shift assistance provided  Head of Bed (HOB) Positioning: HOB at 30-45 degrees  Pressure Reduction Devices:   alternating pressure pump (ADD)   elbow protectors utilized   specialty bed utilized   pressure-redistributing mattress utilized   positioning supports utilized   heel offloading device utilized  Skin Protection:   adhesive use limited   incontinence pads utilized   tubing/devices free from skin contact   skin-to-device areas padded  Taken 6/12/2024 1400 by Amy Abebe RN  Pressure Reduction Techniques:   heels elevated off bed   pressure points  protected   weight shift assistance provided  Head of Bed (HOB) Positioning: HOB at 30-45 degrees  Pressure Reduction Devices:   alternating pressure pump (ADD)   elbow protectors utilized   specialty bed utilized   pressure-redistributing mattress utilized   positioning supports utilized   heel offloading device utilized  Skin Protection:   adhesive use limited   incontinence pads utilized   tubing/devices free from skin contact   skin-to-device areas padded  Taken 6/12/2024 1200 by Amy Abebe RN  Pressure Reduction Techniques:   heels elevated off bed   pressure points protected   weight shift assistance provided  Head of Bed (HOB) Positioning: HOB at 30-45 degrees  Pressure Reduction Devices:   alternating pressure pump (ADD)   elbow protectors utilized   specialty bed utilized   pressure-redistributing mattress utilized   positioning supports utilized   heel offloading device utilized  Skin Protection:   adhesive use limited   incontinence pads utilized   tubing/devices free from skin contact   skin-to-device areas padded  Taken 6/12/2024 1000 by Amy Abebe RN  Pressure Reduction Techniques:   heels elevated off bed   pressure points protected   weight shift assistance provided  Head of Bed (HOB) Positioning: HOB at 30-45 degrees  Pressure Reduction Devices:   alternating pressure pump (ADD)   elbow protectors utilized   pressure-redistributing mattress utilized   specialty bed utilized   positioning supports utilized   heel offloading device utilized  Skin Protection:   adhesive use limited   incontinence pads utilized   tubing/devices free from skin contact   skin-to-device areas padded   silicone foam dressing in place  Taken 6/12/2024 0800 by Amy Abebe RN  Pressure Reduction Techniques:   heels elevated off bed   pressure points protected   weight shift assistance provided  Pressure Reduction Devices:   alternating pressure pump (ADD)   elbow protectors  utilized   specialty bed utilized   pressure-redistributing mattress utilized   positioning supports utilized   heel offloading device utilized  Skin Protection:   adhesive use limited   incontinence pads utilized   tubing/devices free from skin contact   skin-to-device areas padded   pulse oximeter probe site changed   silicone foam dressing in place   protective footwear used     Problem: Hypertension Comorbidity  Goal: Blood Pressure in Desired Range  Outcome: Ongoing, Progressing  Intervention: Maintain Blood Pressure Management  Recent Flowsheet Documentation  Taken 6/12/2024 1800 by Amy Abebe RN  Medication Review/Management: medications reviewed  Taken 6/12/2024 1600 by Amy Abebe RN  Medication Review/Management: medications reviewed  Taken 6/12/2024 1400 by Amy Abebe, RN  Medication Review/Management: medications reviewed  Taken 6/12/2024 1200 by Amy Abebe, RN  Medication Review/Management: medications reviewed  Taken 6/12/2024 1000 by Amy Abebe RN  Medication Review/Management: medications reviewed  Taken 6/12/2024 0800 by Amy Abebe, RN  Medication Review/Management: medications reviewed     Problem: Device-Related Complication Risk (Hemodialysis)  Goal: Safe, Effective Therapy Delivery  Outcome: Ongoing, Progressing  Intervention: Optimize Device Care and Function  Recent Flowsheet Documentation  Taken 6/12/2024 1800 by Amy Abebe RN  Medication Review/Management: medications reviewed  Taken 6/12/2024 1600 by Amy Abebe, RN  Medication Review/Management: medications reviewed  Taken 6/12/2024 1400 by Amy Abebe, RN  Medication Review/Management: medications reviewed  Taken 6/12/2024 1200 by Amy Abebe, RN  Medication Review/Management: medications reviewed  Taken 6/12/2024 1000 by Amy Abebe, RN  Medication Review/Management: medications  reviewed  Taken 6/12/2024 0800 by Amy Abebe, RN  Medication Review/Management: medications reviewed     Problem: Hemodynamic Instability (Hemodialysis)  Goal: Effective Tissue Perfusion  Outcome: Ongoing, Progressing     Problem: Infection (Hemodialysis)  Goal: Absence of Infection Signs and Symptoms  Outcome: Ongoing, Progressing   Goal Outcome Evaluation:              Outcome Evaluation: Pt continues mechanical ventilation, FiO2 @ 85, PEEP @ 14.  Attempted to wean FiO2 to 65% per MD, pt tolerated for ~45 minutes before requiring FiO2 to increase back to 85%.  Peak pressure remain high, mid 30's-40's.  Pt is pulling volumes, occassionally stacking breathes, fentanyl bolus's and versed utilized.  Fentanyl infusing @ 300.  Pt opens eyes to touch/sound and withdraws from pain, but not following commands.  Pt on levophed most of day, weaned off this evening, UF of 50 most of the day.  Filter change x1 due to clotting, sq heparin started and CRRT settings adjusted.  Pt continues with increased residuals and abdominal distention.  TF on hold, recommended by dietician to hold prosource as well.  No UOP or BM today.

## 2024-06-12 NOTE — NURSING NOTE
CRRT rounds completed. Treatment plan  reviewed with DEBBIE Reyes. Orders and documentation reviewed. Prescription for CRRT machine reviewed with nurse- any needed adjustments made at this time.  Dressing to dialysis access visualized and intact. Dressing change due 6/15/24.  Primary nurse encouraged to call [phone]  or on call dialysis nurse for assistance or any questions.

## 2024-06-12 NOTE — CASE MANAGEMENT/SOCIAL WORK
Continued Stay Note  [facility]     Patient Name: Dalila Velez  MRN: [id number]  Today's Date: 6/12/2024    Admit Date: 6/5/2024    Plan: ongoing   Discharge Plan       Row Name 06/12/24 1018       Plan    Plan ongoing    Patient/Family in Agreement with Plan other (see comments)    Plan Comments Discussed in MDR, patient remains intubated/sedated w/NG, keofeed, tube feeds. FiO2 was increased overnight. CRRT initiated on 6/11. D/C plan TBD @ this time. CM will continue to follow.    Final Discharge Disposition Code 30 - still a patient                   Discharge Codes    No documentation.                 Expected Discharge Date and Time       Expected Discharge Date Expected Discharge Time    Jun 14, 2024               Jame Villarreal RN

## 2024-06-12 NOTE — PROGRESS NOTES
"INTENSIVIST   PROGRESS NOTE     Hospital:  LOS: 7 days     Chief Complaint: AAA rupture, hypoxic respiratory failure    Subjective   S     Interval History: Vasopressors initiated overnight and have been continued throughout the course of day.  Patient remains critically ill, intubated and sedated.  Have tried to wean mechanical ventilator settings but have been unsuccessful due to hypoxia.  She remains on CRRT.    The patient's relevant past medical, surgical and social history were reviewed and updated in Epic as appropriate.      Objective   O     Intake/Ouptut 24 hrs (7:00AM - 6:59 AM)  Intake & Output (last 3 days)         06/07 0701 06/08 0700 06/08 0701 06/09 0700 06/09 0701  06/10 0700 06/10 0701 06/11 0700    I.V. (mL/kg) 1556.7 (19.6) 542.5 (6.7) 227.2 (2.8)     Blood  304 278     Other 120 270 502 20    NG/ 210 650     IV Piggyback        Total Intake(mL/kg) 1936.7 (24.3) 1326.5 (16.3) 1657.2 (20.3) 20 (0.2)    Urine (mL/kg/hr) 480 (0.3) 310 (0.2) 210 (0.1)     Stool 0       Dialysis  800      Total Output 480 1110 210     Net +1456.7 +216.5 +1447.2 +20            Stool Unmeasured Occurrence 2 x        Medications (drips):  dextrose, Last Rate: 50 mL/hr (06/11/24 1728)  fentanyl 10 mcg/mL, Last Rate: 300 mcg/hr (06/12/24 0450)  niCARdipine  norepinephrine, Last Rate: 0.06 mcg/kg/min (06/12/24 0820)  Phoxillum BK4/2.5, Last Rate: 1,000 mL/hr (06/12/24 0431)  Phoxillum BK4/2.5, Last Rate: 1,000 mL/hr (06/12/24 0431)  Phoxillum BK4/2.5, Last Rate: 1,000 mL/hr (06/12/24 0431)    Respiratory Support: MV    Physical Examination:  Vital Signs: Blood pressure 106/42, pulse 79, temperature 98.2 °F (36.8 °C), temperature source Axillary, resp. rate 17, height 168.9 cm (66.5\"), weight 72.9 kg (160 lb 11.5 oz), SpO2 93%.    General: Critically ill-appearing.  On mechanical ventilation.  ENT/Neck: ETT in place.  No JVD.  Moist base membranes.  Chest: Clear to auscultation bilaterally, No wheezing, rhonchi, or " rales.  Synchronous on mechanical ventilation.  Equal chest rise.  Cardiac: Normal rate, S1S2 auscultated. No murmurs, rubs or gallops.   Abdomen: Soft, non-tender, non-distended, positive bowel sounds in all four quadrants.   Extremities: No lower extremity edema. No clubbing or cyanosis.  Neuro: Intubated, sedated.    Lines, Drains & Airways       Active LDAs       Name Placement date Placement time Site Days    CVC Triple Lumen 06/05/24 Non-tunneled Right Internal jugular 06/05/24  2130  Internal jugular  4    Peripheral IV 06/05/24 0723 Distal;Left;Posterior Forearm 06/05/24  0723  Forearm  5    Peripheral IV 06/05/24 1700 Anterior;Proximal;Right Forearm 06/05/24  1700  Forearm  4    NG/OG Tube Nasogastric 18 Fr Right nostril 06/05/24  1630  Right nostril  4    NG/OG Tube Nasoduodenal 10 Fr Left nostril 06/10/24  1000  Left nostril  less than 1    Urethral Catheter Non-latex;Temperature probe 16 Fr. 06/05/24  1629  -- 4    ETT  06/05/24  1404  created via procedure documentation  -- 4    Arterial Line 06/05/24 Right Radial 06/05/24  1700  Radial  4    Hemodialysis Cath Double with Pigtail 06/08/24  1230  Internal Jugular  2        Results from last 7 days   Lab Units 06/12/24  0558 06/11/24  2358 06/11/24  1644   WBC 10*3/mm3 9.45 10.95* 8.32   HEMOGLOBIN g/dL 8.3* 8.5* 7.9*   MCV fL 90.6 91.6 90.5   PLATELETS 10*3/mm3 127* 128* 97*     Results from last 7 days   Lab Units 06/12/24  0558 06/11/24  2358 06/11/24  1644 06/11/24  1159 06/11/24  0748   SODIUM mmol/L 135* 133*  134* 135*  136   < > 137   POTASSIUM mmol/L 4.8 4.8  4.9 5.1  5.1   < > 5.5*   CO2 mmol/L 20.0* 21.0*  21.0* 18.0*  19.0*   < > 21.0*   CREATININE mg/dL 1.37* 1.54*  1.59* 2.04*  1.90*   < > 2.45*   GLUCOSE mg/dL 112* 107*  106* 62*  64*   < > 78   MAGNESIUM mg/dL  --  2.4 2.3  --  2.3   PHOSPHORUS mg/dL  --  5.4* 6.4*  --  6.7*    < > = values in this interval not displayed.     Estimated Creatinine Clearance: 37.8 mL/min (A) (by  C-G formula based on SCr of 1.37 mg/dL (H)).  Results from last 7 days   Lab Units 06/12/24  0558 06/10/24  0551   ALK PHOS U/L 163* 150*   BILIRUBIN mg/dL 0.5 0.3   ALT (SGPT) U/L <5 <5   AST (SGOT) U/L 74* 71*     Results from last 7 days   Lab Units 06/12/24  0320 06/11/24  0404 06/10/24  1505 06/10/24  0349 06/09/24  0356   PH, ARTERIAL pH units 7.236* 7.244* 7.220* 7.262* 7.284*   PCO2, ARTERIAL mm Hg 50.8* 47.6* 47.7* 42.2 37.5   PO2 ART mm Hg 61.1* 73.2* 78.0* 71.2* 65.6*   FIO2 % 65 65 100 60 40     Images:  XR Chest 1 View    Result Date: 6/12/2024  Impression: Mildly increased bilateral pleural effusions. Electronically Signed: Steven Ko MD  6/12/2024 6:06 AM EDT  Workstation ID: [id number]    XR Chest 1 View    Result Date: 6/11/2024  Impression: Slightly improved bilateral pleural effusions and bibasilar atelectasis, left greater than right. Unchanged position of lines/tubes. Electronically Signed: Malik Mcneal MD  6/11/2024 7:19 AM EDT  Workstation ID: [id number]    XR Chest 1 View    Result Date: 6/10/2024  Impression: 1. Right and left IJ central venous catheter tips in stable position near low and mid SVC respectively. No pneumothorax. 2. Cardiomegaly with grossly stable bilateral pleural effusions and presumed adjacent bibasal atelectasis. Questionable interstitial edema. Electronically Signed: Madhav Grider MD  6/10/2024 4:33 PM EDT  Workstation ID: [id number]    XR Abdomen KUB    Result Date: 6/10/2024  Impression: Nasogastric tube tip and sidehole overlie the stomach. Weighted tip feeding catheter tip overlies the third portion of the duodenum. Electronically Signed: Malik Mcneal MD  6/10/2024 12:06 PM EDT  Workstation ID: [id number]     ECHO (6/09/2024):     Left ventricular ejection fraction appears to be 61 - 65%.    Left ventricular diastolic function was normal.    The aortic valve exhibits sclerosis.    Aortic valve maximum pressure gradient is 16 mmHg.    Estimated right ventricular  systolic pressure from tricuspid regurgitation is mildly elevated (35-45 mmHg). Calculated right ventricular systolic pressure from tricuspid regurgitation is 41 mmHg.    Results: Reviewed.  - I reviewed the patient's new laboratory and imaging results.  - I independently reviewed the patient's new images.    Medications: Reviewed.    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     Patient Agustin is a 74F with past medical history coronary artery disease, peripheral vascular disease, hypertension, CKD III and previous renal artery stenosis with stenting who underwent endovascular repair today of a juxtarenal abdominal aortic aneurysm. Initial surgery was completed without complication however the patient began to become hemodynamically unstable and was found to have a hemoglobin of 6.6 down from 12.9 preoperatively. She was taken emergently back to the operating room and a leak from the renal artery was repaired.    #Juxtarenal AAA  -Primary management per vascular surgery. Status post endovascular repair. Vascular surgery closely following    #MICHAEL on CKD III  #Hyperkalemia  -Baseline creatinine 1.4-1.5   -Right atrophic kidney and left renal hemorrhage status post distal inferior branch embolization (6/05)   -CRRT per nephrology    #Hypotension   #Acute hypoxic respiratory failure requiring intubation/mechanical ventilation  -Mechanical ventilation adjustments and SBT assessment daily  -Frequent CXR, blood gas   -Given ongoing poor respiratory status (unable to titrate FiO2, PEEP  despite CRRT), increase in WBC and worsening tachycardia over the last 48 hours--> repeating respiratory + blood cultures and starting antibiotics empirically     #Acute blood loss anemia  -Transfuse RBC for hemoglobin less than 7; Last transfused on 6/09  -Holding ASA, Lovenox    #Hypothyroidism  -Continue levothyroxine    F-Tube feeds per dietary recs  A-Fentanyl  S-NA  T-SQH  H-Head of bed greater than 30 degrees  U-Pepcid   G-FSBS per unit protocol, correction dose insulin  S-SBT assessment daily   B-Will monitor daily and provide regimen if indicated  I-CVC (6/05), arterial line (6/05)   D-Cefepime     Advance Directives:   Code Status and Medical Interventions:   Ordered at: 06/06/24 0903     Level Of Support Discussed With:    Patient     Code Status (Patient has no pulse and is not breathing):    CPR (Attempt to Resuscitate)     Medical Interventions (Patient has pulse or is breathing):    Full Support     High level of risk due to severe exacerbation of chronic illness and illness with threat to life or bodily function.    I conducted multidisciplinary rounds in the plan of care was discussed with the multidisciplinary team at that time. In attendance at multidisciplinary rounds was clinical pharmacist, dietitian, nursing staff and case management.    I discussed the patient's findings and my recommendations with patient, nursing staff, and consulting provider.      Critical Care Time: Critical Care time spent in direct patient care: 40 minutes (excluding procedure time, if applicable) including high complexity decision making to assess, manipulate, and support vital organ system failure in this individual who has impairment of one or more vital organ systems such that there is a high probability of imminent or life threatening deterioration in the patient’s condition.     -- Albert Morales MD  Pulmonary/Critical Care

## 2024-06-12 NOTE — PROGRESS NOTES
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    PSH:  She  has a past surgical history that includes Cholecystectomy; Coronary angioplasty; Renal artery stent (Bilateral, 2012); Carotid endarterectomy (Left, 2006); Total hip arthroplasty (Left, 06/2017); Cataract extraction, bilateral; Hysterectomy; Cervical spine surgery; Colonoscopy; AAA repair, open (N/A, 6/5/2024); and AAA repair, open (N/A, 6/5/2024).    Applicable Nutrition History:   ARF/VENT 6-5-24  s/p EVAR 6-5-24 6-5-24:Hemorrhagic shock, s/p Left renal distal inferior artery embolization for hemorrhage, Graftmaster covered stent placement for distal renal artery thrombus versus dissection  6-8-24 HD started  6-10-24 pt did not tolerate HD, CRRT started    SKIN: surgical sites, L gluteal, L distal toe, L back, L flank: multiple areas that may represent DIC/ micro embolism/ coagulapathy ulcers per WOC    Labs    Labs Reviewed: Yes    Results from last 7 days   Lab Units 06/12/24  0837 06/12/24  0558 06/11/24  2358 06/11/24  1644 06/10/24  1211 06/10/24  0551 06/08/24  1809 06/08/24  1247 06/08/24  0537 06/08/24  0056   GLUCOSE mg/dL 145* 112* 107*  106* 62*  64*   < >  109*  109*   < > 75 92 97   BUN mg/dL 20 20 22  23 28*  27*   < > 64*  64*   < > 55* 53* 50*   CREATININE mg/dL 1.34* 1.37* 1.54*  1.59* 2.04*  1.90*   < > 5.85*  5.85*   < > 2.94* 4.72* 4.20*   SODIUM mmol/L 133* 135* 133*  134* 135*  136   < > 137  137   < > 138 137 138   CHLORIDE mmol/L 98 101 98  100 100  101   < > 102  102   < > 104 104 105   POTASSIUM mmol/L 4.7 4.8 4.8  4.9 5.1  5.1   < > 5.4*  5.4*   < > 4.8 4.6 4.5   PHOSPHORUS mg/dL 5.0*  --  5.4* 6.4*   < > 9.1*  --   --   --   --    MAGNESIUM mg/dL 2.8*  --  2.4 2.3   < > 2.0   < >  --   --   --    ALT (SGPT) U/L  --  <5  --   --   --  <5  --   --   --   --    LACTATE mmol/L  --   --   --   --   --   --   --  0.7 0.8 1.0    < > = values in this interval not displayed.       Results from last 7 days   Lab Units 06/12/24  0837 06/12/24  0558 06/11/24  2358 06/11/24  1644 06/10/24  2335 06/10/24  0551 06/09/24  0432 06/08/24  1247   ALBUMIN g/dL 3.1* 3.1* 3.1* 2.9*   < > 2.8*  --   --    PREALBUMIN mg/dL  --   --   --   --   --  11.1*  --   --    CRP mg/dL  --   --   --   --   --  40.25*  --   --    IONIZED CALCIUM mmol/L 1.19  --  1.19 1.14   < >  --   --   --    CHOLESTEROL mg/dL  --   --   --   --   --  177  --   --    TRIGLYCERIDES mg/dL  --   --   --   --   --  679* 1,236* 1,343*    < > = values in this interval not displayed.       Results from last 7 days   Lab Units 06/12/24  0525 06/11/24  2305 06/11/24  1737 06/11/24  1718 06/11/24  1141 06/11/24  0747   GLUCOSE mg/dL 109 101 135* 58* 80 80     Lab Results   Lab Value Date/Time    HGBA1C 5.50 05/29/2024 1511               Medications    Medications Reviewed: yes  Pepcid, insulin, reglan, miralax  + fentanyl, levophed 0.1mcg, D5%@50ml    Intake/Ouptut 24 hrs (0701 - 0700)   I&O's Reviewed: yes  Intake & Output (last day)         06/11 0701 06/12 0700 06/12 0701 06/13 0700    I.V. (mL/kg) 1284.6 (17.6) 271.4 (3.7)    Other 309 30    NG/GT 1067 258    IV Piggyback 50     Total  "Intake(mL/kg) 2710.6 (37.2) 559.4 (7.7)    Urine (mL/kg/hr) 5 (0)     Emesis/NG output      Stool 0     Dialysis 5597 674    Total Output 5602 674    Net -2891.4 -114.6          Stool Unmeasured Occurrence 2 x               Anthropometrics     Height: Height: 168.9 cm (66.5\")  Last Filed Weight: Weight: 72.9 kg (160 lb 11.5 oz) (06/12/24 0600)  Method: Weight Method: Bed scale  BMI: BMI (Calculated): 25.6    UBW: Per EMR wt of 150 lbs on 11/23/22, 148 lbs on 1/24/24 Standing Scale wt of 144 lbs on 5/9/24   Weight change: ? Accuracy of current bed wt. Non significant change per prior hx     Weight       Weight (kg) Weight (lbs) Weight Method Visit Report   2/15/2017 69.945 kg  154 lb 3.2 oz   --    4/5/2017 69.854 kg  154 lb      8/16/2017 68.04 kg  150 lb  Stated     8/28/2017 67.132 kg  148 lb   --    11/2/2017 66.679 kg  147 lb  Stated     12/5/2017 67.132 kg  148 lb   --    9/5/2018 67.132 kg  148 lb      9/19/2018 67.45 kg  148 lb 11.2 oz   --    9/25/2019 69.854 kg  154 lb   --    12/26/2019 69.854 kg  154 lb      11/4/2020 73.936 kg  163 lb   --    11/17/2021 67.586 kg  149 lb   --    11/23/2022 68.357 kg  150 lb 11.2 oz   --    1/4/2023 68.04 kg  150 lb      1/24/2024 67.223 kg  148 lb 3.2 oz   --    5/29/2024 65.35 kg  144 lb 1.1 oz  Standing scale     6/5/2024 65.35 kg  144 lb 1.1 oz      6/7/2024 80.9 kg  178 lb 5.6 oz  Bed scale     6/8/2024 79.6 kg  175 lb 7.8 oz  Bed scale     6/9/2024 81.4 kg  179 lb 7.3 oz      6/10/2024 81.7 kg  180 lb 1.9 oz  Bed scale           Nutrition Focused Physical Exam     Date: 6/7    Unable to perform due to Pt unable to participate at time of visit     Subjective   Reported/Observed/Food/Nutrition Related History:     6-12-24: pt intubated, sedated on CRRT  + levophed 0.1mcg, fentanyl, D5%@50ml    Per RN: pt has not been pulling volumes, PEEP 14/ FiO2 85%, had to start levophed last night, had GRV's of 575ml yesterday, 500ml GRV last night, appears to have some TF in " "residuals even though pt has post pyloric feed, abdomen is distended, did have 2 bm's yesterday    6-10-24: pt intubated, sedated, + fentanyl    Per RN: pt has GRV 700ml this am, TF held, keofeed placed, reglan ordered, tf restarted at 15ml, pt wakes up but does not follow commands, plan for HD again today        Pt NPO > 72 hr.    Needs Assessment   Date:     Height used:Height: 168.9 cm (66.5\")  Weights used: standing scale wt: 144lb/ 65kg    Estimated Calorie needs: ~1600kcal  Method:  25-30Kcals/K-1950kcal  Method:  MSJ x 1.3: 1534kcal  Method:  PSU: 1268kcal    Estimated Protein needs: ~130g protein with CRRT  Method:1.5-2.5g protein per k-163g protein    Current Nutrition Prescription     PO: NPO Diet NPO Type: Tube Feeding  Oral Nutrition Supplement:  Intake: N/A    EN: NovaSource Renal  Goal Rate: 35ml  Water Flushes: 10ml  Modular: Prosource-no carb 2/day  Route: ND  Tube: Small bore    At goal over: 20Hrs/day     Rx will supply:   Goal Volume 700  mL/day     Flush Volume 200 mL/day     Energy 1520 Kcal/day 95 % Est Need   Protein 93 g/day 72 % Est Need   Fiber 0 g/day     Water in   mL     Total Water 704 mL     Meet DRI No        --------------------------------------------------------------------------  Product/Rate verified at bedside: Yes  Infusing Rate at time of visit: 30ml    Average Delivery from [facility] Day:   Volume 802 mL/day 115  % Goal Vol.   Flush Volume 574 mL/day     Energy  Kcal/day  % Est Need   Protein  g/day  % Est Need   Fiber  g/day     Water in  EN  mL     Total Water  mL     Meet DRI No          Assessment & Plan   Nutrition Diagnosis   Date:  Updated:   Problem Inadequate energy intake    Etiology ARF/VENT   Signs/Symptoms NPO   Status: EN support      Goal:   Nutrition to support treatment and Adjust EN      Nutrition Intervention      Follow treatment progress, Care plan reviewed, Nutrition support order placed    Will adjust EN to better meet " protein and kcal needs with CRRT    Decrease Novasource Renal to 33ml/hr, increase Prosource 5x/day, free water @10ml/hr    TF at goal volume will provide 660ml, 1620kcal, 101% kcal needs, 135g protein, 104% protein needs, 675ml free water    Suggest add MVI as TF volume too low to meet RDI's    Rec hold EN if continue to increase pressor support      Monitoring/Evaluation:   Per protocol, I&O, Supplement intake, Pertinent labs, Weight, Skin status, GI status, Symptoms, Hemodynamic stability    Lisa Finch, RD  Time Spent:45min

## 2024-06-12 NOTE — PROGRESS NOTES
" LOS: 7 days    Patient Care Team:  Frannie Couch MD as PCP - General    Reason For Visit: Acute renal failure.     Subjective     Intubated sedated, on 1 pressor.  CRRT ongoing.  Having issues with clotting; adjust CRRT setting.   Discussed with the daughter and nurse  at bedside  Rios removed yesterday.     Objective     amLODIPine, 10 mg, Nasogastric, Q24H  bisoprolol, 2.5 mg, Nasogastric, Daily  budesonide, 0.5 mg, Nebulization, BID - RT  castor oil-balsam peru, 1 Application, Topical, Q12H  cefepime, 2,000 mg, Intravenous, Once  [START ON 6/13/2024] cefepime, 2,000 mg, Intravenous, Q8H  chlorhexidine, 15 mL, Mouth/Throat, Q12H  famotidine, 20 mg, Intravenous, Daily  heparin (porcine), 5,000 Units, Subcutaneous, Q8H  insulin regular, 2-9 Units, Subcutaneous, Q6H  ipratropium-albuterol, 3 mL, Nebulization, Q6H - RT  levothyroxine, 100 mcg, Nasogastric, Q AM  metoclopramide, 5 mg, Intravenous, Q8H  polyethylene glycol, 17 g, Nasogastric, Daily  ProSource No Carb, 30 mL, Nasogastric, 5x Daily  sertraline, 200 mg, Nasogastric, Daily  sodium chloride, 10 mL, Intravenous, Q12H      dextrose, 50 mL/hr, Last Rate: 50 mL/hr (06/12/24 1219)  fentanyl 10 mcg/mL,  mcg/hr, Last Rate: 300 mcg/hr (06/12/24 0914)  norepinephrine, 0.02-0.3 mcg/kg/min, Last Rate: 0.04 mcg/kg/min (06/12/24 1432)  Phoxillum BK4/2.5, 1,000 mL/hr, Last Rate: 1,000 mL/hr (06/12/24 1502)  Phoxillum BK4/2.5, 1,500 mL/hr, Last Rate: 1,500 mL/hr (06/12/24 1424)      Vital Signs:  Blood pressure 108/44, pulse 86, temperature 98.8 °F (37.1 °C), temperature source Axillary, resp. rate 18, height 168.9 cm (66.5\"), weight 72.9 kg (160 lb 11.5 oz), SpO2 92%.    Flowsheet Rows      Flowsheet Row First Filed Value   Admission Height 168.9 cm (66.5\") Documented at 06/05/2024 0740   Admission Weight 65.4 kg (144 lb 1.1 oz) Documented at 06/05/2024 0740            06/11 0701 - 06/12 0700  In: 2710.6 [I.V.:1284.6]  Out: 5602 [Urine:5]    Physical " Exam:  GENERAL: Intubated and sedated.   HEENT: Normocephalic, atraumatic.    NECK: Supple   HEART: RRR; No murmur, rubs, gallops. Trace edema  LUNGS: Mechanical breath sounds.   ABDOMEN: Soft, nontender, nondistended..  EXT:  ++  edema.  : no Rios   SKIN: Warm and dry without rash  NEURO: Unable to assess  PSYCHIATRIC: Unable to assess    Labs:  Results from last 7 days   Lab Units 06/12/24  1208 06/12/24  0558 06/11/24  2358   WBC 10*3/mm3 11.27* 9.45 10.95*   HEMOGLOBIN g/dL 8.1* 8.3* 8.5*   HEMATOCRIT % 25.5* 26.1* 27.1*   PLATELETS 10*3/mm3 131* 127* 128*     Results from last 7 days   Lab Units 06/12/24  1313 06/12/24  0837 06/12/24  0558 06/11/24  2358 06/11/24  1644 06/11/24  1159 06/11/24  0748   SODIUM mmol/L 135* 133* 135* 133*  134* 135*  136   < > 137   POTASSIUM mmol/L 4.6 4.7 4.8 4.8  4.9 5.1  5.1   < > 5.5*   CHLORIDE mmol/L 101 98 101 98  100 100  101   < > 104   CO2 mmol/L 23.0 22.0 20.0* 21.0*  21.0* 18.0*  19.0*   < > 21.0*   BUN mg/dL 19 20 20 22  23 28*  27*   < > 31*   CREATININE mg/dL 1.21* 1.34* 1.37* 1.54*  1.59* 2.04*  1.90*   < > 2.45*   CALCIUM mg/dL 8.4* 8.3* 8.4* 8.6  8.5* 8.0*  8.1*   < > 8.3*   PHOSPHORUS mg/dL  --  5.0*  --  5.4* 6.4*  --  6.7*   MAGNESIUM mg/dL  --  2.8*  --  2.4 2.3  --  2.3   ALBUMIN g/dL  --  3.1* 3.1* 3.1* 2.9*  --  3.1*    < > = values in this interval not displayed.     Results from last 7 days   Lab Units 06/12/24  1313   GLUCOSE mg/dL 121*       Results from last 7 days   Lab Units 06/12/24  0558   ALK PHOS U/L 163*   BILIRUBIN mg/dL 0.5   ALT (SGPT) U/L <5   AST (SGOT) U/L 74*     Results from last 7 days   Lab Units 06/12/24  0320   PH, ARTERIAL pH units 7.236*   PO2 ART mm Hg 61.1*   PCO2, ARTERIAL mm Hg 50.8*   HCO3 ART mmol/L 21.5         Estimated Creatinine Clearance: 42.8 mL/min (A) (by C-G formula based on SCr of 1.21 mg/dL (H)).    Chest  Xray:   IMPRESSION:  Impression:  Worsening small right-sided pleural effusion with probable  overlying atelectasis. Mild retraction of the endotracheal tube with the tip in the proximal to mid trachea     CT abdomen:   Impression:  1. Left renal artery stent with high-grade stenosis just distal to the stent, possibly from a dissection flap.  2. Large active extravasation from the mid pole left renal cortex with a large pararenal and retroperitoneal hematoma.   3. Left lower lobe atelectasis, which was noted at time of dictation. These findings were being communicated to the OR.  4. Abdominal aortic aneurysm status post endograft repair.    Assessment     Acute renal failure  Abdominal aortic aneurysm repair 6/5  Left renal hemorrhage s/p distal inferior branch embolization  Right atrophic kidney  Acute blood loss anemia  Hypertension   Dyslipidemia  Coronary artery disease    PLAN:     Acute renal failure on CKD stage III:   - Baseline renal function 1.4-1.5.  Patient follows up with Dr. Brown.  - Right atrophic kidney.  Left renal hemorrhage s/p distal inferior branch embolization 6/5.   - Urine output minimal.  CRRT initiated on 6/8.  -Continue CRRT for now.  Patient is anuric.   - Strict ROBERTO's.   - Dose meds to GFR   - Avoid nephrotoxins     Metabolic acidosis:   - Managed with dialysis.     Acute blood loss anemia:   -S/p 8 units of PRBC   -Transfuse as needed to keep hemoglobin above 7.      Abdominal aortic aneurysm repair 6/5:   -Vascular following.      Hypertension:   -S/p Cardene drip.  Now off. >>  Hypotensive on pressors now.     High risk and critically ill patient    Serafin Junior MD  06/12/24  15:54 EDT

## 2024-06-12 NOTE — PLAN OF CARE
Goal Outcome Evaluation:  Plan of Care Reviewed With: patient        Progress: declining  Outcome Evaluation: Increased peak airway pressure, decresed tidal volumes, pt-vent asynchrony, and decreasing SpO2/increasing need for higher FiO2 throughout night. PRN versed given and fentanyl gtt increased. FiO2 increased to %. Pt increasingly wheezy despite neb treatments. Pt de-saturates wtih minimal movement/stimulation. Levophed started and titrated per order for BP support. Continuing to pull ; CRRT filter changed x1. NG residual 500 mL; 240 mL returned to pt.

## 2024-06-12 NOTE — PROGRESS NOTES
"   LOS: 7 days   Patient Care Team:  Frannie Couch MD as PCP - General      Subjective   Requiring increased FiO2, increased fentanyl and PRN versed overnight. Started on Levophed. CRRT, ricardo discontinued yesterday per nephrology. Abdomen remains firm, 2 loose Bms yesterday.     Subjective    History taken from: chart RN    Objective     Vital Signs  Temp:  [97 °F (36.1 °C)-98.2 °F (36.8 °C)] 98.2 °F (36.8 °C)  Heart Rate:  [67-86] 79  Resp:  [16-22] 17  BP: ()/(28-73) 106/42  FiO2 (%):  [65 %-85 %] 85 %    Objective:  Vital signs: (most recent): Blood pressure 106/42, pulse 79, temperature 98.2 °F (36.8 °C), temperature source Axillary, resp. rate 17, height 168.9 cm (66.5\"), weight 72.9 kg (160 lb 11.5 oz), SpO2 93%.            Physical Exam:  Present at bedside: nursing   General: no acute distress, intubated and sedated   Respiratory: ETT tube secured, NG tube in place  Abdomen: firm, distended  Bilateral Groins: access sites c/d/I, soft, minimal surrounding ecchymosis, no evidence of hematoma  Extremities: warm and pink with 1+ edema    Results Review:     I reviewed the patient's new clinical results.  CBC    Results from last 7 days   Lab Units 06/12/24  0558 06/11/24  2358 06/11/24  1644 06/11/24  1159 06/11/24  0608 06/10/24  2335 06/10/24  1754   WBC 10*3/mm3 9.45 10.95* 8.32 8.31 7.98 7.18 6.89   HEMOGLOBIN g/dL 8.3* 8.5* 7.9* 8.4* 8.7* 8.2* 8.3*   PLATELETS 10*3/mm3 127* 128* 97* 98* 94* 81* 84*     BMP   Results from last 7 days   Lab Units 06/12/24  0558 06/11/24  2358 06/11/24  1644 06/11/24  1159 06/11/24  0748 06/11/24  0608 06/10/24  2335 06/10/24  1211 06/10/24  0551 06/09/24  1207 06/09/24  0432 06/06/24  0514 06/06/24  0034   SODIUM mmol/L 135* 133*  134* 135*  136 138 137 136 135*  135*   < > 137  137   < > 133*   < > 148*   POTASSIUM mmol/L 4.8 4.8  4.9 5.1  5.1 5.5* 5.5* 5.4* 5.6*  5.6*   < > 5.4*  5.4*   < > 5.1   < > 3.6   CHLORIDE mmol/L 101 98  100 100  101 102 104 " 103 103  103   < > 102  102   < > 100   < > 114*   CO2 mmol/L 20.0* 21.0*  21.0* 18.0*  19.0* 20.0* 21.0* 19.0* 18.0*  18.0*   < > 18.0*  18.0*   < > 16.0*   < > 23.0   BUN mg/dL 20 22  23 28*  27* 29* 31* 31* 41*  39*   < > 64*  64*   < > 53*   < > 30*   CREATININE mg/dL 1.37* 1.54*  1.59* 2.04*  1.90* 2.14* 2.45* 2.58* 3.34*  2.30*   < > 5.85*  5.85*   < > 4.34*   < > 2.20*   GLUCOSE mg/dL 112* 107*  106* 62*  64* 81 78 77 172*  175*   < > 109*  109*   < > 65   < > 176*   MAGNESIUM mg/dL  --  2.4 2.3  --  2.3  --  2.2  --  2.0  --  1.9  --  1.6   PHOSPHORUS mg/dL  --  5.4* 6.4*  --  6.7*  --  6.9*  --  9.1*  --   --   --   --     < > = values in this interval not displayed.     CMP   Results from last 7 days   Lab Units 06/12/24  0558 06/11/24  2358 06/11/24  1644 06/11/24  1159 06/11/24  0748 06/11/24  0608 06/10/24  2335 06/10/24  1211 06/10/24  0551   SODIUM mmol/L 135* 133*  134* 135*  136 138 137 136 135*  135*   < > 137  137   POTASSIUM mmol/L 4.8 4.8  4.9 5.1  5.1 5.5* 5.5* 5.4* 5.6*  5.6*   < > 5.4*  5.4*   CHLORIDE mmol/L 101 98  100 100  101 102 104 103 103  103   < > 102  102   CO2 mmol/L 20.0* 21.0*  21.0* 18.0*  19.0* 20.0* 21.0* 19.0* 18.0*  18.0*   < > 18.0*  18.0*   BUN mg/dL 20 22  23 28*  27* 29* 31* 31* 41*  39*   < > 64*  64*   CREATININE mg/dL 1.37* 1.54*  1.59* 2.04*  1.90* 2.14* 2.45* 2.58* 3.34*  2.30*   < > 5.85*  5.85*   GLUCOSE mg/dL 112* 107*  106* 62*  64* 81 78 77 172*  175*   < > 109*  109*   ALBUMIN g/dL 3.1* 3.1* 2.9*  --  3.1*  --  2.7*  --  2.8*   BILIRUBIN mg/dL 0.5  --   --   --   --   --   --   --  0.3   ALK PHOS U/L 163*  --   --   --   --   --   --   --  150*   AST (SGOT) U/L 74*  --   --   --   --   --   --   --  71*   ALT (SGPT) U/L <5  --   --   --   --   --   --   --  <5    < > = values in this interval not displayed.     ABG    Results from last 7 days   Lab Units 06/12/24  0320 06/11/24  0404 06/10/24  1507 06/10/24  0349  06/09/24  0356 06/08/24  0400 06/07/24  0146   PH, ARTERIAL pH units 7.236* 7.244* 7.220* 7.262* 7.284* 7.370 7.387   PCO2, ARTERIAL mm Hg 50.8* 47.6* 47.7* 42.2 37.5 35.1 35.6   PO2 ART mm Hg 61.1* 73.2* 78.0* 71.2* 65.6* 55.5* 66.0*   BASE EXCESS ART mmol/L -5.8* -6.5* -7.8* -7.5* -8.2* -4.6* -3.3*     Radiology(recent) XR Chest 1 View    Result Date: 6/12/2024  Impression: Mildly increased bilateral pleural effusions. Electronically Signed: Steven Ko MD  6/12/2024 6:06 AM EDT  Workstation ID: [id number]    XR Chest 1 View    Result Date: 6/11/2024  Impression: Slightly improved bilateral pleural effusions and bibasilar atelectasis, left greater than right. Unchanged position of lines/tubes. Electronically Signed: Malik Mcneal MD  6/11/2024 7:19 AM EDT  Workstation ID: [id number]    XR Chest 1 View    Result Date: 6/10/2024  Impression: 1. Right and left IJ central venous catheter tips in stable position near low and mid SVC respectively. No pneumothorax. 2. Cardiomegaly with grossly stable bilateral pleural effusions and presumed adjacent bibasal atelectasis. Questionable interstitial edema. Electronically Signed: Madhav Grider MD  6/10/2024 4:33 PM EDT  Workstation ID: [id number]    XR Abdomen KUB    Result Date: 6/10/2024  Impression: Nasogastric tube tip and sidehole overlie the stomach. Weighted tip feeding catheter tip overlies the third portion of the duodenum. Electronically Signed: Malik Mcneal MD  6/10/2024 12:06 PM EDT  Workstation ID: [id number]        Current Facility-Administered Medications:     amLODIPine (NORVASC) tablet 10 mg, 10 mg, Nasogastric, Q24H, Saúl Cao MD, 10 mg at 06/11/24 0809    bisoprolol (ZEBeta) tablet 2.5 mg, 2.5 mg, Nasogastric, Daily, Saúl Cao MD, 2.5 mg at 06/11/24 0809    budesonide (PULMICORT) nebulizer solution 0.5 mg, 0.5 mg, Nebulization, BID - RT, Karan Munroe APRN, 0.5 mg at 06/12/24 0801    Calcium Replacement - Follow Nurse / BPA Driven  Protocol, , Does not apply, PRN, Michell Ugarte MD    castor oil-balsam peru (VENELEX) ointment 1 Application, 1 Application, Topical, Q12H, Theodore Reyes MD, 1 Application at 06/11/24 2029    chlorhexidine (PERIDEX) 0.12 % solution 15 mL, 15 mL, Mouth/Throat, Q12H, Michell Ugarte MD, 15 mL at 06/11/24 2029    dextrose (D50W) (25 g/50 mL) IV injection 10-50 mL, 10-50 mL, Intravenous, Q15 Min PRN, Karan Munroe APRN, 50 mL at 06/11/24 1723    dextrose (D5W) 5 % infusion, 50 mL/hr, Intravenous, Continuous, Stephane Morales MD, Last Rate: 50 mL/hr at 06/11/24 1728, 50 mL/hr at 06/11/24 1728    diazePAM (VALIUM) tablet 5 mg, 5 mg, Nasogastric, Daily PRN, Saúl Cao MD    [Held by provider] Enoxaparin Sodium (LOVENOX) syringe 30 mg, 30 mg, Subcutaneous, Daily, Theodore Reyes MD, 30 mg at 06/07/24 0820    famotidine (PEPCID) injection 20 mg, 20 mg, Intravenous, Daily, Michell Ugarte MD, 20 mg at 06/11/24 0810    fentaNYL (SUBLIMAZE) bolus from bag 10 mcg/mL injection 50 mcg, 50 mcg, Intravenous, Q30 Min PRN, Scott Perkins MD, 50 mcg at 06/12/24 0449    fentaNYL 2500 mcg/250 mL NS infusion,  mcg/hr, Intravenous, Titrated, Scott Perkins MD, Last Rate: 30 mL/hr at 06/12/24 0450, 300 mcg/hr at 06/12/24 0450    glucagon (GLUCAGEN) injection 1 mg, 1 mg, Intramuscular, Q15 Min PRN, Karan Munroe, APRJOHN    heparin (porcine) injection 1,000-2,000 Units, 1,000-2,000 Units, Intravenous, PRN, Serafin Junior MD    insulin regular (humuLIN R,novoLIN R) injection 2-9 Units, 2-9 Units, Subcutaneous, Q6H, Karan Munroe, MORIAH, 2 Units at 06/06/24 1738    ipratropium-albuterol (DUO-NEB) nebulizer solution 3 mL, 3 mL, Nebulization, Q4H PRN, Yesenia Osorio APRN, 3 mL at 06/11/24 2237    ipratropium-albuterol (DUO-NEB) nebulizer solution 3 mL, 3 mL, Nebulization, Q6H - RT, Karan Munroe APRN, 3 mL at 06/12/24 0800    levothyroxine (SYNTHROID, LEVOTHROID) tablet 100 mcg, 100  mcg, Nasogastric, Q AM, Saúl Cao MD, 100 mcg at 24 0559    Magnesium Low Dose Replacement - Follow Nurse / BPA Driven Protocol, , Does not apply, PRN, Michell Ugarte MD    metoclopramide (REGLAN) injection 5 mg, 5 mg, Intravenous, Q8H, Theodore Reyes MD, 5 mg at 24 0559    midazolam (VERSED) injection 2 mg, 2 mg, Intravenous, Q1H PRN, Karan Munroe APRN, 2 mg at 24 0435    [] morphine injection 1 mg, 1 mg, Intravenous, Q4H PRN **AND** naloxone (NARCAN) injection 0.4 mg, 0.4 mg, Intravenous, Q5 Min PRN, Saúl Coa MD    niCARdipine (CARDENE) 50 mg in sodium chloride 0.9 % 250 mL IVPB, 5-15 mg/hr, Intravenous, Titrated, Junior Herrera MD    nitroglycerin (NITROSTAT) SL tablet 0.4 mg, 0.4 mg, Sublingual, Q5 Min PRN, Theodore Reyes MD    norepinephrine (LEVOPHED) 8 mg in 250 mL NS infusion (premix), 0.02-0.3 mcg/kg/min, Intravenous, Titrated, Karan Munroe APRN, Last Rate: 3.06 mL/hr at 24 0615, 0.02 mcg/kg/min at 24 0615    ondansetron (ZOFRAN) injection 4 mg, 4 mg, Intravenous, Q6H PRN, Michell Ugarte MD    Phosphorus Replacement - Follow Nurse / BPA Driven Protocol, , Does not apply, PRN, Michell Ugarte MD    Phoxillum BK4/2.5 dialysis solution, 1,000 mL/hr, CRRT, Continuous, Serafin Junior MD, Last Rate: 1,000 mL/hr at 24 0431, 1,000 mL/hr at 24 0431    Phoxillum BK4/2.5 dialysis solution, 1,000 mL/hr, CRRT, Continuous, Serafin Junior MD, Last Rate: 1,000 mL/hr at 24 0431, 1,000 mL/hr at 24 043    Phoxillum BK4/2.5 dialysis solution, 1,000 mL/hr, CRRT, Continuous, Serafin Junior MD, Last Rate: 1,000 mL/hr at 24 043, 1,000 mL/hr at 24    polyethylene glycol (MIRALAX) packet 17 g, 17 g, Nasogastric, Daily, Saúl Cao MD, 17 g at 24 0810    polyvinyl alcohol (LIQUIFILM) 1.4 % ophthalmic solution 2 drop, 2 drop, Both Eyes, Q1H PRN, Michell Ugarte MD     Potassium Replacement - Follow Nurse / BPA Driven Protocol, , Does not apply, PRN, Michell Ugarte MD    ProSource No Carb oral solution 30 mL, 30 mL, Nasogastric, BID, Stephane Morales MD, 30 mL at 06/11/24 2029    sertraline (ZOLOFT) tablet 200 mg, 200 mg, Nasogastric, Daily, Saúl Cao MD, 200 mg at 06/11/24 0809    sodium chloride 0.9 % flush 10 mL, 10 mL, Intravenous, Q12H, Michell Ugarte MD, 10 mL at 06/11/24 2029    sodium chloride 0.9 % flush 10 mL, 10 mL, Intravenous, PRN, Michell Ugarte MD    sodium chloride 0.9 % infusion 40 mL, 40 mL, Intravenous, PRN, Michell Ugarte MD     Assessment & Plan       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  Large-bore bilateral femoral sheath placement for endovascular graft deployment.  Right 22 Yoruba and left 12 Yoruba  3.  Bifurcated endovascular repair of aortic aneurysm extending into the right external iliac artery and left common iliac artery  - 6/5/24 (Eric):  1.  Left renal distal inferior artery embolization for hemorrhage  2.  Graftmaster covered stent placement for distal renal artery thrombus versus dissection     - Labs reviewed: Hgb 8.3 received 7units pRBC 6/5, required 1 unit pRBC 6/9  - CXR reviewed: mildly increased effusions  - SBP recommendations <160  - hold aspirin, lovenox ppx  - continue IV fluids  - tube feedings held due to residuals, leave NG in place  - corpack for distal feeding  - Rios Dcd per nephrology 6/11  - Reglan 5mg TID to continue until able to tolerate tube feedings  - Pulm/CC following, appreciate assistance   - nephrology following for worsening renal function, 2  hour dialysis 6/8, unsuccessful dialysis 6/10, CRRT continued     Patient's case was reviewed in detail with Dr. Reyes who directed the above plan of care. Plan reviewed with nursing at bedside who verbalized understanding and agreement.     Ani Bergeron PA-C  06/12/24  08:12 EDT

## 2024-06-13 NOTE — PROGRESS NOTES
"INTENSIVIST   PROGRESS NOTE     Hospital:  LOS: 8 days     Chief Complaint: AAA rupture, hypoxic respiratory failure    Subjective   S     Interval History: Remains critically ill, prognosis guarded. Intubated/sedated. Unable to wean mechanical ventilation over the last 24 hours. NG placed to low wall suction with copious output; tube feeds being held.    The patient's relevant past medical, surgical and social history were reviewed and updated in Epic as appropriate.      Objective   O     Intake/Ouptut 24 hrs (7:00AM - 6:59 AM)  Intake & Output (last 3 days)         06/07 0701 06/08 0700 06/08 0701 06/09 0700 06/09 0701  06/10 0700 06/10 0701 06/11 0700    I.V. (mL/kg) 1556.7 (19.6) 542.5 (6.7) 227.2 (2.8)     Blood  304 278     Other 120 270 502 20    NG/ 210 650     IV Piggyback        Total Intake(mL/kg) 1936.7 (24.3) 1326.5 (16.3) 1657.2 (20.3) 20 (0.2)    Urine (mL/kg/hr) 480 (0.3) 310 (0.2) 210 (0.1)     Stool 0       Dialysis  800      Total Output 480 1110 210     Net +1456.7 +216.5 +1447.2 +20            Stool Unmeasured Occurrence 2 x        Medications (drips):  dextrose, Last Rate: 50 mL/hr (06/13/24 0546)  fentanyl 10 mcg/mL, Last Rate: 300 mcg/hr (06/13/24 0056)  norepinephrine, Last Rate: 0.04 mcg/kg/min (06/13/24 0054)  Phoxillum BK4/2.5, Last Rate: 1,500 mL/hr (06/13/24 0751)    Respiratory Support: MV    Physical Examination:  Vital Signs: Blood pressure 117/46, pulse 95, temperature 98.3 °F (36.8 °C), temperature source Axillary, resp. rate 16, height 168.9 cm (66.5\"), weight 71.6 kg (157 lb 13.6 oz), SpO2 93%.    General: Critically ill-appearing.  On mechanical ventilation.  ENT/Neck: ETT in place.  No JVD.    Chest: Clear to auscultation bilaterally, No wheezing, rhonchi, or rales.  Synchronous on mechanical ventilation.  Equal chest rise.  Cardiac: Normal rate, S1S2 auscultated. No murmurs, rubs or gallops.   Abdomen: Soft but taught, no appreciable tenderness, minimally-distended, " positive bowel sounds in all four quadrants.   Extremities: 1+ lower extremity edema. No clubbing or cyanosis.  Neuro: Intubated, sedated.    Lines, Drains & Airways       Active LDAs       Name Placement date Placement time Site Days    CVC Triple Lumen 06/05/24 Non-tunneled Right Internal jugular 06/05/24  2130  Internal jugular  4    Peripheral IV 06/05/24 0723 Distal;Left;Posterior Forearm 06/05/24  0723  Forearm  5    Peripheral IV 06/05/24 1700 Anterior;Proximal;Right Forearm 06/05/24  1700  Forearm  4    NG/OG Tube Nasogastric 18 Fr Right nostril 06/05/24  1630  Right nostril  4    NG/OG Tube Nasoduodenal 10 Fr Left nostril 06/10/24  1000  Left nostril  less than 1    Urethral Catheter Non-latex;Temperature probe 16 Fr. 06/05/24  1629  -- 4    ETT  06/05/24  1404  created via procedure documentation  -- 4    Arterial Line 06/05/24 Right Radial 06/05/24  1700  Radial  4    Hemodialysis Cath Double with Pigtail 06/08/24  1230  Internal Jugular  2        Results from last 7 days   Lab Units 06/13/24  0545 06/13/24  0008 06/12/24  1800   WBC 10*3/mm3 14.87* 13.45* 12.06*   HEMOGLOBIN g/dL 7.6* 7.3* 7.7*   MCV fL 90.9 90.4 89.6   PLATELETS 10*3/mm3 144 129* 125*     Results from last 7 days   Lab Units 06/13/24  0753 06/13/24  0545 06/13/24  0008 06/12/24  1800   SODIUM mmol/L 134* 130* 134* 132*  134*   POTASSIUM mmol/L 4.6 4.5 4.7 4.5  4.7   CO2 mmol/L 21.0* 20.0* 21.0* 23.0  23.0   CREATININE mg/dL 1.25* 1.17* 1.29* 1.18*  1.22*   GLUCOSE mg/dL 99 98 114* 108*  108*   MAGNESIUM mg/dL 2.4  --  2.3 2.4   PHOSPHORUS mg/dL 4.5  --  4.7* 4.5     Estimated Creatinine Clearance: 45.3 mL/min (A) (by C-G formula based on SCr of 1.25 mg/dL (H)).  Results from last 7 days   Lab Units 06/12/24  0558 06/10/24  0551   ALK PHOS U/L 163* 150*   BILIRUBIN mg/dL 0.5 0.3   ALT (SGPT) U/L <5 <5   AST (SGOT) U/L 74* 71*     Results from last 7 days   Lab Units 06/13/24  0414 06/12/24  0320 06/11/24  0404 06/10/24  1509  06/10/24  0349   PH, ARTERIAL pH units 7.231* 7.236* 7.244* 7.220* 7.262*   PCO2, ARTERIAL mm Hg 51.1* 50.8* 47.6* 47.7* 42.2   PO2 ART mm Hg 68.5* 61.1* 73.2* 78.0* 71.2*   FIO2 % 80 65 65 100 60     Images:  XR Chest 1 View    Result Date: 6/13/2024  Impression: No significant interval change. Electronically Signed: Steven Ko MD  6/13/2024 6:17 AM EDT  Workstation ID: [id number]    XR Chest 1 View    Result Date: 6/12/2024  Impression: Mildly increased bilateral pleural effusions. Electronically Signed: Steven Ko MD  6/12/2024 6:06 AM EDT  Workstation ID: [id number]     ECHO (6/09/2024):     Left ventricular ejection fraction appears to be 61 - 65%.    Left ventricular diastolic function was normal.    The aortic valve exhibits sclerosis.    Aortic valve maximum pressure gradient is 16 mmHg.    Estimated right ventricular systolic pressure from tricuspid regurgitation is mildly elevated (35-45 mmHg). Calculated right ventricular systolic pressure from tricuspid regurgitation is 41 mmHg.    Results: Reviewed.  - I reviewed the patient's new laboratory and imaging results.  - I independently reviewed the patient's new images.    Medications: Reviewed.    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     Patient Agustin is a 74F with past medical history coronary artery disease, peripheral vascular disease, hypertension, CKD III and previous renal artery stenosis with stenting who underwent endovascular repair today of a juxtarenal abdominal aortic aneurysm. Initial surgery was  completed without complication however the patient began to become hemodynamically unstable and was found to have a hemoglobin of 6.6 down from 12.9 preoperatively. She was taken emergently back to the operating room and a leak from the renal artery was repaired.    #Juxtarenal AAA  -Primary management per vascular surgery. Status post endovascular repair. Vascular surgery closely following  -Given lack of clinical improvement, increasing white count and shock --> STAT CT abdomen/pelvis (6/13) per vascular surgery     #MICHAEL on CKD III  #Hyperkalemia  -Baseline creatinine 1.4-1.5   -Right atrophic kidney and left renal hemorrhage status post distal inferior branch embolization (6/05)   -CRRT per nephrology    #Hypotension   #Acute hypoxic respiratory failure requiring intubation/mechanical ventilation  -Mechanical ventilation adjustments and SBT assessment daily  -Frequent CXR, blood gas   -Given ongoing poor respiratory status (unable to titrate FiO2, PEEP despite CRRT), increase in WBC and worsening tachycardia over the last 48 hours--> repeating respiratory + blood cultures and starting antibiotics empirically; broadened to Zosyn on 6/13    #Acute blood loss anemia  -Transfuse RBC for hemoglobin less than 7; Last transfused on 6/09  -Holding ASA, Lovenox    #Hypothyroidism  -Continue levothyroxine    F-Tube feeds per dietary recs  A-Fentanyl  S-NA  T-SQH  H-Head of bed greater than 30 degrees  U-Pepcid   G-FSBS per unit protocol, correction dose insulin  S-SBT assessment daily   B-Will monitor daily and provide regimen if indicated  I-CVC (6/05), Arterial line (6/05)   D- Zosyn    Advance Directives:   Code Status and Medical Interventions:   Ordered at: 06/06/24 0903     Level Of Support Discussed With:    Patient     Code Status (Patient has no pulse and is not breathing):    CPR (Attempt to Resuscitate)     Medical Interventions (Patient has pulse or is breathing):    Full Support     High level of risk due to  severe exacerbation of chronic illness and illness with threat to life or bodily function.    I conducted multidisciplinary rounds in the plan of care was discussed with the multidisciplinary team at that time. In attendance at multidisciplinary rounds was clinical pharmacist, dietitian, nursing staff and case management.    I discussed the patient's findings and my recommendations with patient, nursing staff, and consulting provider.      Critical Care Time: Critical Care time spent in direct patient care: 45 minutes (excluding procedure time, if applicable) including high complexity decision making to assess, manipulate, and support vital organ system failure in this individual who has impairment of one or more vital organ systems such that there is a high probability of imminent or life threatening deterioration in the patient’s condition.     -- Albert Morales MD  Pulmonary/Critical Care

## 2024-06-13 NOTE — PROGRESS NOTES
"   LOS: 8 days   Patient Care Team:  Frannie Couch MD as PCP - General      Subjective   POD#8 s/p EVAR, followed by emergent left renal artery embolization with stent placement for hemorrhage (6/5/24, Eric).  Hemoglobin stable with initiation of subcutaneous heparin 6/12.  FiO2 decreased to 75% but PEEP remains at 14 with peak pressures in the 30s. Abdomen remains distended, producing no urine, no BM since 6/11. Continues to have NG residuals despite post pyloric feedings. Nursing having difficulty maintaining MAP >65.     Subjective    History taken from: chart RN    Objective     Vital Signs  Temp:  [97.9 °F (36.6 °C)-99.2 °F (37.3 °C)] 98.8 °F (37.1 °C)  Heart Rate:  [] 100  Resp:  [16-23] 16  BP: ()/(42-65) 143/58  FiO2 (%):  [65 %-100 %] 75 %    Objective:  Vital signs: (most recent): Blood pressure 143/58, pulse 100, temperature 98.8 °F (37.1 °C), temperature source Axillary, resp. rate 16, height 168.9 cm (66.5\"), weight 71.6 kg (157 lb 13.6 oz), SpO2 94%.            Physical Exam:  Present at bedside: nursing, Dr. Junior (Nephrology)  General: no acute distress, intubated and sedated, opens eyes intermittently  Respiratory: ETT tube secured, NG tube in place  Abdomen: firm, distended  Bilateral Groins: access sites c/d/I, soft, minimal surrounding ecchymosis, no evidence of hematoma  Extremities: warm and pink with trace edema, audible DP signals bilaterally     Results Review:     I reviewed the patient's new clinical results.  CBC    Results from last 7 days   Lab Units 06/13/24  0545 06/13/24  0008 06/12/24  1800 06/12/24  1208 06/12/24  0558 06/11/24  2358 06/11/24  1644   WBC 10*3/mm3 14.87* 13.45* 12.06* 11.27* 9.45 10.95* 8.32   HEMOGLOBIN g/dL 7.6* 7.3* 7.7* 8.1* 8.3* 8.5* 7.9*   PLATELETS 10*3/mm3 144 129* 125* 131* 127* 128* 97*     BMP   Results from last 7 days   Lab Units 06/13/24  0753 06/13/24  0545 06/13/24  0008 06/12/24  1800 06/12/24  1313 06/12/24  0837 06/12/24  0558 " 06/11/24  2358 06/11/24  1644 06/11/24  1159 06/11/24  0748   SODIUM mmol/L 134* 130* 134* 132*  134* 135* 133* 135* 133*  134* 135*  136   < > 137   POTASSIUM mmol/L 4.6 4.5 4.7 4.5  4.7 4.6 4.7 4.8 4.8  4.9 5.1  5.1   < > 5.5*   CHLORIDE mmol/L 102 98 103 99  102 101 98 101 98  100 100  101   < > 104   CO2 mmol/L 21.0* 20.0* 21.0* 23.0  23.0 23.0 22.0 20.0* 21.0*  21.0* 18.0*  19.0*   < > 21.0*   BUN mg/dL 22 21 22 19  19 19 20 20 22  23 28*  27*   < > 31*   CREATININE mg/dL 1.25* 1.17* 1.29* 1.18*  1.22* 1.21* 1.34* 1.37* 1.54*  1.59* 2.04*  1.90*   < > 2.45*   GLUCOSE mg/dL 99 98 114* 108*  108* 121* 145* 112* 107*  106* 62*  64*   < > 78   MAGNESIUM mg/dL 2.4  --  2.3 2.4  --  2.8*  --  2.4 2.3  --  2.3   PHOSPHORUS mg/dL 4.5  --  4.7* 4.5  --  5.0*  --  5.4* 6.4*  --  6.7*    < > = values in this interval not displayed.     CMP   Results from last 7 days   Lab Units 06/13/24  0753 06/13/24  0545 06/13/24  0008 06/12/24  1800 06/12/24  1313 06/12/24  0837 06/12/24  0558 06/11/24  2358 06/11/24  1644 06/10/24  1211 06/10/24  0551   SODIUM mmol/L 134* 130* 134* 132*  134* 135* 133* 135* 133*  134* 135*  136   < > 137  137   POTASSIUM mmol/L 4.6 4.5 4.7 4.5  4.7 4.6 4.7 4.8 4.8  4.9 5.1  5.1   < > 5.4*  5.4*   CHLORIDE mmol/L 102 98 103 99  102 101 98 101 98  100 100  101   < > 102  102   CO2 mmol/L 21.0* 20.0* 21.0* 23.0  23.0 23.0 22.0 20.0* 21.0*  21.0* 18.0*  19.0*   < > 18.0*  18.0*   BUN mg/dL 22 21 22 19  19 19 20 20 22  23 28*  27*   < > 64*  64*   CREATININE mg/dL 1.25* 1.17* 1.29* 1.18*  1.22* 1.21* 1.34* 1.37* 1.54*  1.59* 2.04*  1.90*   < > 5.85*  5.85*   GLUCOSE mg/dL 99 98 114* 108*  108* 121* 145* 112* 107*  106* 62*  64*   < > 109*  109*   ALBUMIN g/dL 3.0*  --  2.8* 2.9*  --  3.1* 3.1* 3.1* 2.9*   < > 2.8*   BILIRUBIN mg/dL  --   --   --   --   --   --  0.5  --   --   --  0.3   ALK PHOS U/L  --   --   --   --   --   --  163*  --   --   --  150*    AST (SGOT) U/L  --   --   --   --   --   --  74*  --   --   --  71*   ALT (SGPT) U/L  --   --   --   --   --   --  <5  --   --   --  <5    < > = values in this interval not displayed.     ABG    Results from last 7 days   Lab Units 06/13/24  0414 06/12/24  0320 06/11/24  0404 06/10/24  1505 06/10/24  0349 06/09/24  0356 06/08/24  0400   PH, ARTERIAL pH units 7.231* 7.236* 7.244* 7.220* 7.262* 7.284* 7.370   PCO2, ARTERIAL mm Hg 51.1* 50.8* 47.6* 47.7* 42.2 37.5 35.1   PO2 ART mm Hg 68.5* 61.1* 73.2* 78.0* 71.2* 65.6* 55.5*   BASE EXCESS ART mmol/L -5.8* -5.8* -6.5* -7.8* -7.5* -8.2* -4.6*     Radiology(recent) XR Chest 1 View    Result Date: 6/13/2024  Impression: No significant interval change. Electronically Signed: Steven Ko MD  6/13/2024 6:17 AM EDT  Workstation ID: [id number]    XR Chest 1 View    Result Date: 6/12/2024  Impression: Mildly increased bilateral pleural effusions. Electronically Signed: Steven Ko MD  6/12/2024 6:06 AM EDT  Workstation ID: [id number]        Current Facility-Administered Medications:     albumin human 5 % solution 250 mL, 250 mL, Intravenous, Q30 Min, Theodore Reyes MD, 250 mL at 06/13/24 0848    amLODIPine (NORVASC) tablet 10 mg, 10 mg, Nasogastric, Q24H, Saúl Cao MD, 10 mg at 06/11/24 0809    bisoprolol (ZEBeta) tablet 2.5 mg, 2.5 mg, Nasogastric, Daily, Saúl Cao MD, 2.5 mg at 06/11/24 0809    budesonide (PULMICORT) nebulizer solution 0.5 mg, 0.5 mg, Nebulization, BID - RT, Karan Munroe, APRN, 0.5 mg at 06/13/24 0706    Calcium Replacement - Follow Nurse / BPA Driven Protocol, , Does not apply, PRN, Michell Ugarte MD    castor oil-balsam peru (VENELEX) ointment 1 Application, 1 Application, Topical, Q12H, Theodore Reyes MD, 1 Application at 06/13/24 0802    cefepime 2000 mg IVPB in 100 mL NS (MBP), 2,000 mg, Intravenous, Q8H, Stephane Morales MD, 2,000 mg at 06/13/24 0536    chlorhexidine (PERIDEX) 0.12 % solution 15 mL, 15 mL,  Mouth/Throat, Q12H, Michell Ugarte MD, 15 mL at 06/13/24 0802    dextrose (D50W) (25 g/50 mL) IV injection 10-50 mL, 10-50 mL, Intravenous, Q15 Min PRN, Karan Munroe APRN, 50 mL at 06/11/24 1723    dextrose (D5W) 5 % infusion, 50 mL/hr, Intravenous, Continuous, Stephane Morales MD, Last Rate: 50 mL/hr at 06/13/24 0552, 50 mL/hr at 06/13/24 0552    diazePAM (VALIUM) tablet 5 mg, 5 mg, Nasogastric, Daily PRN, Saúl Cao MD    famotidine (PEPCID) injection 20 mg, 20 mg, Intravenous, Daily, Michell Ugarte MD, 20 mg at 06/13/24 0802    fentaNYL 2500 mcg/250 mL NS infusion,  mcg/hr, Intravenous, Titrated, Scott Perkins MD, Last Rate: 30 mL/hr at 06/13/24 0056, 300 mcg/hr at 06/13/24 0056    glucagon (GLUCAGEN) injection 1 mg, 1 mg, Intramuscular, Q15 Min PRN, Karan Munroe APRN    heparin (porcine) 5000 UNIT/ML injection 5,000 Units, 5,000 Units, Subcutaneous, Q8H, Theodore Reyes MD, 5,000 Units at 06/13/24 0536    heparin (porcine) injection 1,000-2,000 Units, 1,000-2,000 Units, Intravenous, PRN, Serafin Junior MD    insulin regular (humuLIN R,novoLIN R) injection 2-9 Units, 2-9 Units, Subcutaneous, Q6H, Karan Munroe APRN, 2 Units at 06/06/24 1738    ipratropium-albuterol (DUO-NEB) nebulizer solution 3 mL, 3 mL, Nebulization, Q4H PRN, Yesenia Osorio APRN, 3 mL at 06/11/24 2237    ipratropium-albuterol (DUO-NEB) nebulizer solution 3 mL, 3 mL, Nebulization, Q6H - RT, Karan Munroe APRN, 3 mL at 06/13/24 0706    levothyroxine (SYNTHROID, LEVOTHROID) tablet 100 mcg, 100 mcg, Nasogastric, Q AM, Saúl Cao MD, 100 mcg at 06/13/24 0536    Magnesium Low Dose Replacement - Follow Nurse / BPA Driven Protocol, , Does not apply, PRN, Michell Ugarte MD    metoclopramide (REGLAN) injection 5 mg, 5 mg, Intravenous, Q8H, Theodore Reyes MD, 5 mg at 06/13/24 0536    midazolam (VERSED) injection 2 mg, 2 mg, Intravenous, Q1H PRN, Karan Munroe APRN, 2 mg  at 24 0043    [] morphine injection 1 mg, 1 mg, Intravenous, Q4H PRN **AND** naloxone (NARCAN) injection 0.4 mg, 0.4 mg, Intravenous, Q5 Min PRN, Saúl Cao MD    nitroglycerin (NITROSTAT) SL tablet 0.4 mg, 0.4 mg, Sublingual, Q5 Min PRN, Theodore Reyes MD    norepinephrine (LEVOPHED) 8 mg in 250 mL NS infusion (premix), 0.02-0.3 mcg/kg/min, Intravenous, Titrated, Karan Munroe, APRJOHN, Last Rate: 6.13 mL/hr at 24 0054, 0.04 mcg/kg/min at 24 0054    ondansetron (ZOFRAN) injection 4 mg, 4 mg, Intravenous, Q6H PRN, Michell Ugarte MD    Phosphorus Replacement - Follow Nurse / BPA Driven Protocol, , Does not apply, PRN, Michell Ugarte MD    Phoxillum BK4/2.5 dialysis solution, 1,500 mL/hr, CRRT, Continuous, Serafin Junior MD, Last Rate: 1,500 mL/hr at 24 0751, 1,500 mL/hr at 24 0751    polyethylene glycol (MIRALAX) packet 17 g, 17 g, Nasogastric, Daily, Saúl Cao MD, 17 g at 24 0802    polyvinyl alcohol (LIQUIFILM) 1.4 % ophthalmic solution 2 drop, 2 drop, Both Eyes, Q1H PRN, Michell Ugarte MD    Potassium Replacement - Follow Nurse / BPA Driven Protocol, , Does not apply, PRN, Michell Ugarte MD    ProSource No Carb oral solution 30 mL, 30 mL, Nasogastric, 5x Daily, Stephane Morales MD, 30 mL at 24 1805    sertraline (ZOLOFT) tablet 200 mg, 200 mg, Nasogastric, Daily, Saúl Cao MD, 200 mg at 24 0802    sodium chloride 0.9 % flush 10 mL, 10 mL, Intravenous, Q12H, Michell Ugarte MD, 10 mL at 24 0802    sodium chloride 0.9 % flush 10 mL, 10 mL, Intravenous, PRN, Michell Ugarte MD    sodium chloride 0.9 % infusion 40 mL, 40 mL, Intravenous, PRN, Michell Ugarte MD     Assessment & Plan       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    Renal artery stenosis    Hypertension    Hypothyroidism    Acute blood loss anemia    Acute kidney injury    Acute  respiratory failure with hypoxia      Assessment & Plan  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  Large-bore bilateral femoral sheath placement for endovascular graft deployment.  Right 22 [place] and left 12 [place]  3.  Bifurcated endovascular repair of aortic aneurysm extending into the right external iliac artery and left common iliac artery  - 6/5/24 (Eric):  1.  Left renal distal inferior artery embolization for hemorrhage  2.  Graftmaster covered stent placement for distal renal artery thrombus versus dissection     - Labs reviewed: Hgb 7.6, received 7units pRBC 6/5, required 1 unit pRBC 6/9, WBC 12.06-->14.87  - CXR reviewed and stable   - SBP recommendations <160  - continue sq heparin 5000u TID, asa held   - IV fluids  - tube feedings held due to high residuals, leave NG in place, corpack for distal feeding  - Reglan 5mg TID to continue until able to tolerate tube feedings  - Pulm/CC following, appreciate assistance   - Nephrology following for worsening renal function, CRRT continued, appreciate assistance   - Recommend against increase in pressors at this time  - Recommend oral contrast CT Abd/Pelvis when ok per Intinsivist    Patient's case was reviewed in detail with Dr. Reyes who directed the above plan of care. Plan reviewed with nursing at bedside who verbalized understanding and agreement.     Ani Bergeron PA-C  06/13/24  09:08 EDT

## 2024-06-13 NOTE — PLAN OF CARE
Goal Outcome Evaluation:  Plan of Care Reviewed With: patient        Progress: no change  Outcome Evaluation: FiO2 weaned to 75%; PEEP remains 14. Peak pressures 30's most of shift. Titrating levophed per order. Fentanyl gtt continues; PRN versed given x1. CRRT filter changed x1 due to clotting. NG tube output checked; 200 mL; TF remains on hold.

## 2024-06-13 NOTE — PLAN OF CARE
Goal Outcome Evaluation:  1 UPRBC administered.  CVVHD: UF 0-150, BP labile. Per vascular team, attempt to NOT increase levo passed current rate 0.04 mcg/kg/min. No UOP.  Obtained CT a/p and chest w/ contrast.   Continues to require high settings on ventilator. PEEP 14, FiO2 75%-100%. Peak pressures 30s-50s. Providers aware. PRN versed and fentanyl bolus' administered.   Tmax: 99 axillary  - Levo currently infusing @ 0.02 mcg/kg/min  -D5 @ 50 mL/hr  -Fentanyl gtt @ 300 mcg/hr  NG to LWS. 550 mL output. Dark green, oily, seedy output. Providers aware. TF remains on hold.  Does not follow commands. Intermittently withdraws, but mostly no purposeful movements. Does open eyes to voice.  Family updated at bedside.

## 2024-06-13 NOTE — NURSING NOTE
CRRT rounds completed. Treatment plan  reviewed with DEBBIE March. Orders and documentation reviewed. Prescription for CRRT machine reviewed with nurse- any needed adjustments made at this time.  Dressing to dialysis access visualized and intact. Dressing change due 6/15/24.  Primary nurse encouraged to call [phone]  or on call dialysis nurse for assistance or any questions.   2 filter changes since last rounding. Ongoing clotting issues. Dr. Junior aware and changes made to prescription.

## 2024-06-13 NOTE — PROGRESS NOTES
" LOS: 8 days    Patient Care Team:  Frannie Couch MD as PCP - General    Reason For Visit: Acute renal failure.     Subjective     Intubated sedated on 1 pressor.   Remains critically.     CRRT ongoing >> Frequent circuit clotting; will do CVVHD    Objective     budesonide, 0.5 mg, Nebulization, BID - RT  castor oil-balsam peru, 1 Application, Topical, Q12H  chlorhexidine, 15 mL, Mouth/Throat, Q12H  famotidine, 20 mg, Intravenous, Daily  heparin (porcine), 5,000 Units, Subcutaneous, Q8H  insulin regular, 2-9 Units, Subcutaneous, Q6H  ipratropium-albuterol, 3 mL, Nebulization, Q6H - RT  levothyroxine, 100 mcg, Nasogastric, Q AM  metoclopramide, 5 mg, Intravenous, Q8H  piperacillin-tazobactam, 4.5 g, Intravenous, Q8H  polyethylene glycol, 17 g, Nasogastric, Daily  ProSource No Carb, 30 mL, Nasogastric, 5x Daily  sertraline, 200 mg, Nasogastric, Daily  sodium chloride, 10 mL, Intravenous, Q12H      dextrose, 50 mL/hr, Last Rate: 50 mL/hr (06/13/24 0552)  fentanyl 10 mcg/mL,  mcg/hr, Last Rate: 300 mcg/hr (06/13/24 0947)  norepinephrine, 0.02-0.3 mcg/kg/min, Last Rate: 0.02 mcg/kg/min (06/13/24 1341)  Phoxillum BK4/2.5, 1,500 mL/hr, Last Rate: 1,500 mL/hr (06/13/24 0751)      Vital Signs:  Blood pressure 109/50, pulse 92, temperature 98.7 °F (37.1 °C), temperature source Axillary, resp. rate 16, height 168.9 cm (66.5\"), weight 71.6 kg (157 lb 13.6 oz), SpO2 92%.    Flowsheet Rows      Flowsheet Row First Filed Value   Admission Height 168.9 cm (66.5\") Documented at 06/05/2024 0740   Admission Weight 65.4 kg (144 lb 1.1 oz) Documented at 06/05/2024 0740            06/12 0701 - 06/13 0700  In: 3236.3 [I.V.:2137.7]  Out: 4646     Physical Exam:  GENERAL: Intubated and sedated.   HEENT: Normocephalic, atraumatic.    NECK: Supple   HEART: RRR; No murmur, rubs, gallops. Trace edema  LUNGS: Mechanical breath sounds.   ABDOMEN: Soft, nontender, nondistended..  EXT:  ++  edema.  : no Rios   SKIN: Warm and dry " without rash  NEURO: Unable to assess  PSYCHIATRIC: Unable to assess    Labs:  Results from last 7 days   Lab Units 06/13/24  1203 06/13/24  0545 06/13/24  0008   WBC 10*3/mm3 10.20 14.87* 13.45*   HEMOGLOBIN g/dL 6.6* 7.6* 7.3*   HEMATOCRIT % 20.9* 23.9* 23.5*   PLATELETS 10*3/mm3 128* 144 129*     Results from last 7 days   Lab Units 06/13/24  1203 06/13/24  0753 06/13/24  0545 06/13/24  0008 06/12/24  1800 06/12/24  1313 06/12/24  0837   SODIUM mmol/L 133* 134* 130* 134* 132*  134*   < > 133*   POTASSIUM mmol/L 4.3 4.6 4.5 4.7 4.5  4.7   < > 4.7   CHLORIDE mmol/L 102 102 98 103 99  102   < > 98   CO2 mmol/L 22.0 21.0* 20.0* 21.0* 23.0  23.0   < > 22.0   BUN mg/dL 23 22 21 22 19  19   < > 20   CREATININE mg/dL 1.37* 1.25* 1.17* 1.29* 1.18*  1.22*   < > 1.34*   CALCIUM mg/dL 8.2* 8.3* 8.2* 8.1* 8.3*  8.3*   < > 8.3*   PHOSPHORUS mg/dL  --  4.5  --  4.7* 4.5  --  5.0*   MAGNESIUM mg/dL  --  2.4  --  2.3 2.4  --  2.8*   ALBUMIN g/dL  --  3.0*  --  2.8* 2.9*  --  3.1*    < > = values in this interval not displayed.     Results from last 7 days   Lab Units 06/13/24  1203   GLUCOSE mg/dL 96       Results from last 7 days   Lab Units 06/12/24  0558   ALK PHOS U/L 163*   BILIRUBIN mg/dL 0.5   ALT (SGPT) U/L <5   AST (SGOT) U/L 74*     Results from last 7 days   Lab Units 06/13/24  0414   PH, ARTERIAL pH units 7.231*   PO2 ART mm Hg 68.5*   PCO2, ARTERIAL mm Hg 51.1*   HCO3 ART mmol/L 21.4         Estimated Creatinine Clearance: 41.3 mL/min (A) (by C-G formula based on SCr of 1.37 mg/dL (H)).    Chest  Xray:   IMPRESSION:  Impression:  Worsening small right-sided pleural effusion with probable overlying atelectasis. Mild retraction of the endotracheal tube with the tip in the proximal to mid trachea     CT abdomen:   Impression:  1. Left renal artery stent with high-grade stenosis just distal to the stent, possibly from a dissection flap.  2. Large active extravasation from the mid pole left renal cortex with a  large pararenal and retroperitoneal hematoma.   3. Left lower lobe atelectasis, which was noted at time of dictation. These findings were being communicated to the OR.  4. Abdominal aortic aneurysm status post endograft repair.    Assessment     Acute renal failure  Abdominal aortic aneurysm repair 6/5  Left renal hemorrhage s/p distal inferior branch embolization  Right atrophic kidney  Acute blood loss anemia  Hypertension   Dyslipidemia  Coronary artery disease    PLAN:     Acute renal failure on CKD stage III:   - Baseline renal function 1.4-1.5.  Patient follows up with Dr. Brown.  - Right atrophic kidney.  Left renal hemorrhage s/p distal inferior branch embolization 6/5.   - Urine output minimal.  CRRT initiated on 6/8.  -Continue CRRT for now.  Patient is anuric.   - Strict ROBERTO's.   - Dose meds to GFR   - Avoid nephrotoxins     Metabolic acidosis:   - Managed with dialysis.     Acute blood loss anemia:   -S/p 8 units of PRBC   -Transfuse as needed to keep hemoglobin above 7.      Abdominal aortic aneurysm repair 6/5:   -Vascular following.      Hypertension:   -S/p Cardene drip.  Now off. >>  Hypotensive on pressors now.     High risk and critically ill patient    Serafin Junior MD  06/13/24  14:50 EDT

## 2024-06-14 PROBLEM — J96.91 RESPIRATORY FAILURE WITH HYPOXIA: Status: ACTIVE | Noted: 2024-01-01

## 2024-06-14 NOTE — PLAN OF CARE
"Goal Outcome Evaluation:  Plan of Care Reviewed With: patient        Progress: no change  Outcome Evaluation: Palliative RN and Dr. ERYN Terrazas saw pt 6/14 at 1124.  New palliative consult with GOC per Dr. Morales.  No ACP on file.  NOK are daughters Kathy Mcclendon and Irma oBrrego.  Pt. in MODS on increased vent settings along with levophed, versed and fentanyl.  CRRT continues.  Dr. Terrazas discussed code status and introduced hospice.  One daughter was fully on board while the other kept her head down and stayed on the phone.  She seems to have a difficult time coming to terms with the seriousness of her mother's illness.  \"She is really tough\" , daughter stated.  Daughters want to discuss with more family before commiting to any changes in code status or any changes.  Palliative care to continue to follow along for support, POC and ongoing GOC.      Problem: Palliative Care  Goal: Enhanced Quality of Life  Intervention: Promote Advance Care Planning  Flowsheets (Taken 6/14/2024 1403)  Life Transition/Adjustment:   palliative care initiated   palliative care discussed           1300 Palliative IDT meeting: STALIN Cotton RN, CHPN; TAI Islas, APRN;ERYN Terrazas MD.; MARCELO Martinez RN, CHPN; SHAHIDA Che, RN; MARCELO Puri MDiv, [facility]; MORIAH Thompson    After hours, weekends and holidays, contact Palliative Provider by calling [phone].                                    "

## 2024-06-14 NOTE — SIGNIFICANT NOTE
Personally spoke with patients family on 6/14 PM. Family meeting with palliative held earlier in day to discuss ongoing goals given poor clinical progression and prognosis.     Two daughters + four other family members present.     They requested making patient a DNR. Family is considering comfort measures/withdrawal but not ready to make this decision at the moment. Reiterated that there was no rush. Until that decision/transition is made-- ongoing care would be provided up till ACLS would need to be provided.     ICU team will continue to involve family in care plan.     Changed CODE status in EPIC.     -- Albert Morales MD  Pulmonary/Critical Care

## 2024-06-14 NOTE — PROGRESS NOTES
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    PSH:  She  has a past surgical history that includes Cholecystectomy; Coronary angioplasty; Renal artery stent (Bilateral, 2012); Carotid endarterectomy (Left, 2006); Total hip arthroplasty (Left, 06/2017); Cataract extraction, bilateral; Hysterectomy; Cervical spine surgery; Colonoscopy; AAA repair, open (N/A, 6/5/2024); and AAA repair, open (N/A, 6/5/2024).    Applicable Nutrition History:   ARF/VENT 6-5-24  s/p EVAR 6-5-24 6-5-24:Hemorrhagic shock, s/p Left renal distal inferior artery embolization for hemorrhage, Graftmaster covered stent placement for distal renal artery thrombus versus dissection  6-8-24 HD started  6-10-24 pt did not tolerate HD, CRRT started    SKIN: surgical sites, L gluteal, L distal toe, L back, L flank: multiple areas that may represent DIC/ micro embolism/ coagulapathy ulcers per WOC    Labs    Labs Reviewed: Yes    Results from last 7 days   Lab Units 06/14/24  1147 06/14/24  0806 06/14/24  0502 06/14/24  0003 06/13/24  1957 06/13/24  1533 06/12/24  1800 06/12/24  1702 06/12/24  0837 06/12/24  0558 06/10/24  1211 06/10/24  0551 06/08/24  1807  06/08/24  1247   GLUCOSE mg/dL 94 88 94 90   < > 88   < >  --    < > 112*   < > 109*  109*   < > 75   BUN mg/dL 22 23 23 23   < > 22   < >  --    < > 20   < > 64*  64*   < > 55*   CREATININE mg/dL 1.53* 1.62* 1.52* 1.64*   < > 1.46*   < >  --    < > 1.37*   < > 5.85*  5.85*   < > 2.94*   SODIUM mmol/L 135* 134* 133* 133*   < > 133*   < >  --    < > 135*   < > 137  137   < > 138   CHLORIDE mmol/L 101 100 100 100   < > 101   < >  --    < > 101   < > 102  102   < > 104   POTASSIUM mmol/L 4.1 4.2 4.4 4.1   < > 4.2   < >  --    < > 4.8   < > 5.4*  5.4*   < > 4.8   PHOSPHORUS mg/dL  --  4.5  --  4.3  --  4.4   < >  --    < >  --    < > 9.1*  --   --    MAGNESIUM mg/dL  --  2.4  --  2.3  --  2.4   < >  --    < >  --    < > 2.0   < >  --    ALT (SGPT) U/L  --   --  <5  --   --   --   --   --   --  <5  --  <5  --   --    LACTATE mmol/L  --   --   --   --   --  0.7  --  1.5  --   --   --   --   --  0.7    < > = values in this interval not displayed.       Results from last 7 days   Lab Units 06/14/24  0806 06/14/24  0502 06/14/24  0003 06/13/24  1533 06/10/24  2335 06/10/24  0551 06/09/24  0432 06/08/24  1247   ALBUMIN g/dL 3.5 3.6 3.1* 3.1*   < > 2.8*  --   --    PREALBUMIN mg/dL  --   --   --   --   --  11.1*  --   --    CRP mg/dL  --   --   --   --   --  40.25*  --   --    IONIZED CALCIUM mmol/L 1.20  --  1.18 1.19   < >  --   --   --    CHOLESTEROL mg/dL  --   --   --   --   --  177  --   --    TRIGLYCERIDES mg/dL  --   --   --   --   --  679* 1,236* 1,343*    < > = values in this interval not displayed.       Results from last 7 days   Lab Units 06/14/24  1133 06/14/24  0527 06/13/24  2333 06/13/24  1710 06/13/24  1153 06/13/24  0526   GLUCOSE mg/dL 94 96 84 87 106 89     Lab Results   Lab Value Date/Time    HGBA1C 5.50 05/29/2024 1511               Medications    Medications Reviewed: yes  Abx, albumin, steroids, pepcid, heparin, insulin, reglan, miralax  + fentanyl, versed levophed 0.04mcg,  "D5%@50ml    Intake/Ouptut 24 hrs (0701 - 0700)   I&O's Reviewed: yes  Intake & Output (last day)         06/13 0701  06/14 0700 06/14 0701  06/15 0700    I.V. (mL/kg) 2811.4 (36.1) 766.9 (9.9)    Blood 363.5     Other      NG/ 85    IV Piggyback 246.5 54.3    Total Intake(mL/kg) 3826.4 (49.2) 906.2 (11.6)    Urine (mL/kg/hr) 0 (0)     Emesis/NG output 800 200    Stool 0     Dialysis 4374 1874    Total Output 5174 2074    Net -1347.6 -1167.8          Stool Unmeasured Occurrence 0 x               Anthropometrics     Height: Height: 168.9 cm (66.5\")  Last Filed Weight: Weight: 77.8 kg (171 lb 8.3 oz) (06/14/24 0508)  Method: Weight Method: Bed scale  BMI: BMI (Calculated): 27.3    UBW: Per EMR wt of 150 lbs on 11/23/22, 148 lbs on 1/24/24 Standing Scale wt of 144 lbs on 5/9/24   Weight change: ? Accuracy of current bed wt. Non significant change per prior hx     Weight       Weight (kg) Weight (lbs) Weight Method Visit Report   2/15/2017 69.945 kg  154 lb 3.2 oz   --    4/5/2017 69.854 kg  154 lb      8/16/2017 68.04 kg  150 lb  Stated     8/28/2017 67.132 kg  148 lb   --    11/2/2017 66.679 kg  147 lb  Stated     12/5/2017 67.132 kg  148 lb   --    9/5/2018 67.132 kg  148 lb      9/19/2018 67.45 kg  148 lb 11.2 oz   --    9/25/2019 69.854 kg  154 lb   --    12/26/2019 69.854 kg  154 lb      11/4/2020 73.936 kg  163 lb   --    11/17/2021 67.586 kg  149 lb   --    11/23/2022 68.357 kg  150 lb 11.2 oz   --    1/4/2023 68.04 kg  150 lb      1/24/2024 67.223 kg  148 lb 3.2 oz   --    5/29/2024 65.35 kg  144 lb 1.1 oz  Standing scale     6/5/2024 65.35 kg  144 lb 1.1 oz      6/7/2024 80.9 kg  178 lb 5.6 oz  Bed scale     6/8/2024 79.6 kg  175 lb 7.8 oz  Bed scale     6/9/2024 81.4 kg  179 lb 7.3 oz      6/10/2024 81.7 kg  180 lb 1.9 oz  Bed scale           Nutrition Focused Physical Exam     Date: 6/7    Unable to perform due to Pt unable to participate at time of visit     Subjective " "  Reported/Observed/Food/Nutrition Related History:     : pt intubated, sedated, remains on CRRT    Per RN: pt's abdomen still remains distended, last bm , have had ~ 150ml from ngt today, TF on hold, family conference held to day with Palliative Care,  GOC undetermined    24: pt intubated, sedated on CRRT  + levophed 0.1mcg, fentanyl, D5%@50ml    Per RN: pt has not been pulling volumes, PEEP 14/ FiO2 85%, had to start levophed last night, had GRV's of 575ml yesterday, 500ml GRV last night, appears to have some TF in residuals even though pt has post pyloric feed, abdomen is distended, did have 2 bm's yesterday    6-10-24: pt intubated, sedated, + fentanyl    Per RN: pt has GRV 700ml this am, TF held, keofeed placed, reglan ordered, tf restarted at 15ml, pt wakes up but does not follow commands, plan for HD again today        Pt NPO > 72 hr.    Needs Assessment   Date:     Height used:Height: 168.9 cm (66.5\")  Weights used: standing scale wt: 144lb/ 65kg    Estimated Calorie needs: ~1600kcal  Method:  25-30Kcals/K-1950kcal  Method:  MSJ x 1.3: 1534kcal  Method:  PSU: 1268kcal    Estimated Protein needs: ~130g protein with CRRT  Method:1.5-2.5g protein per k-163g protein    Current Nutrition Prescription     PO: NPO Diet NPO Type: Tube Feeding  Oral Nutrition Supplement:  Intake: N/A    EN: NovaSource Renal  Goal Rate: 33ml  Water Flushes: 10ml  Modular: otherProsource 5x/day  Route: ND  Tube: Small bore    At goal over: 20Hrs/day     Rx will supply:   Goal Volume 660  mL/day     Flush Volume 200 mL/day     Energy 1620 Kcal/day 101 % Est Need   Protein 135 g/day 104 % Est Need   Fiber 0 g/day     Water in   mL     Total Water 675 mL     Meet DRI No        --------------------------------------------------------------------------  Product/Rate verified at bedside: N/A  Infusing Rate at time of visit: TF off    Average Delivery from [facility] Day:   Volume 0 mL/day   % Goal Vol. "   Flush Volume  mL/day     Energy  Kcal/day  % Est Need   Protein  g/day  % Est Need   Fiber  g/day     Water in  EN  mL     Total Water  mL     Meet DRI N/A          Assessment & Plan   Nutrition Diagnosis   Date: 6/7 Updated: 6-14  Problem Inadequate energy intake    Etiology ARF/VENT   Signs/Symptoms NPO   Status: TF held since Wednesday 2nd distended abdomen      Goal:   Nutrition to support treatment and hold EN for now      Nutrition Intervention      Follow treatment progress, Care plan reviewed    Pt with high GRV's even with postpyloric feeds, increasing abdominal distention, concern for ischemia    TF held past 2 days    Will dc current TF orders as pt is not being fed now, consider PN if continue aggressive care    PN recs if needed:  start D17% AA9% at 20ml, advance 20ml Q 8 hours to goal rate @70ml/hr, 100ml SMOF lipids (20%) daily    PN at goal volume will provide 1440ml, 1552kcal, 97% kcal needs, 130g protein, 100% protein needs, GIR= 2.6    Wean off D5% once PN at goal       Monitoring/Evaluation:   Per protocol, I&O, Supplement intake, Pertinent labs, Weight, Skin status, GI status, Symptoms, Hemodynamic stability    Lisa Finch, RADHA  Time Spent:45min

## 2024-06-14 NOTE — NURSING NOTE
WOC follow-up wound assessment for left gluteal, distal toes and back.    The area of discoloration on the left gluteal has faded some.  It is slow to marianne.  The presentation of the wounds on the back, gluteal and distal toes appear more as skin failure as well as use of vasopressors.  The areas are not directly over bony prominence.  There is a new area of redness along the right ear there is a change from previous WOC assessment.  Will continue with application of Venelex to these areas of concern.            WOC continue to follow and reassess next week.    Robbie Arellano RN, BSN, CCRN, CWOCN  Wound, Ostomy and Continence (WOC) Department  [facility]

## 2024-06-14 NOTE — NURSING NOTE
CRRT rounds completed. Treatment plan  reviewed with DEBBIE Gayle. Orders and documentation reviewed. Prescription for CRRT machine reviewed with nurse- any needed adjustments made at this time.  Dressing to dialysis access visualized and intact. Dressing change due 6/15/24.  Primary nurse encouraged to call [phone]  or on call dialysis nurse for assistance or any questions.

## 2024-06-14 NOTE — CASE MANAGEMENT/SOCIAL WORK
Continued Stay Note  [facility]     Patient Name: Dalila Velez  MRN: [id number]  Today's Date: 6/14/2024    Admit Date: 6/5/2024    Plan: TBD   Discharge Plan       Row Name 06/14/24 1026       Plan    Plan TBD    Patient/Family in Agreement with Plan other (see comments)    Plan Comments Discussed in MDR, patient remains intubated/sedated, tube feeds remain on hold due to increased residuals. CRRT. D/C plan remains TBD @ this time. Palliative care consulted for [facility]. CM will continue to follow.    Final Discharge Disposition Code 30 - still a patient                   Discharge Codes    No documentation.                 Expected Discharge Date and Time       Expected Discharge Date Expected Discharge Time    Jun 19, 2024               Jame Villarreal RN

## 2024-06-14 NOTE — SIGNIFICANT NOTE
spO2 < 86% on 100% FiO2 and PEEP 14.  Patient dyssynchronous w/ vent despite 2 mg of versed.  Synchrony achieved & spO2 up to 94% w/ additional versed (4 mg ) though transient hypotension developed.  Will try to support w/ colloid infusion to avoid increase in pressor dosing.  Plateau pressure remains ~ 31 so unable to raise PEEP. Prior ABG shows a pH of 7.23 so hesitant to decrease vT to allow for higher PEEP levels @ this time.    Adding versed gtt in hope of being able to maintain synchrony w/o iatrogenic hypotension.  Wean FiO2 as able for spO2 of 88%.

## 2024-06-14 NOTE — PROGRESS NOTES
"INTENSIVIST   PROGRESS NOTE     Hospital:  LOS: 9 days     Chief Complaint: AAA rupture, hypoxic respiratory failure    Subjective   S     Interval History: No major events overnight; however, patient remains very critically ill.  She has required increased support from mechanical ventilation. Afebrile this AM. Tachycardic. Hypotensive. Two daughters at bedside at time of evaluation.     The patient's relevant past medical, surgical and social history were reviewed and updated in Epic as appropriate.      Objective   O     Intake/Ouptut 24 hrs (7:00AM - 6:59 AM)  Intake & Output (last 3 days)         06/07 0701  06/08 0700 06/08 0701 06/09 0700 06/09 0701  06/10 0700 06/10 0701 06/11 0700    I.V. (mL/kg) 1556.7 (19.6) 542.5 (6.7) 227.2 (2.8)     Blood  304 278     Other 120 270 502 20    NG/ 210 650     IV Piggyback        Total Intake(mL/kg) 1936.7 (24.3) 1326.5 (16.3) 1657.2 (20.3) 20 (0.2)    Urine (mL/kg/hr) 480 (0.3) 310 (0.2) 210 (0.1)     Stool 0       Dialysis  800      Total Output 480 1110 210     Net +1456.7 +216.5 +1447.2 +20            Stool Unmeasured Occurrence 2 x        Medications (drips):  dextrose, Last Rate: 50 mL/hr (06/13/24 2150)  fentanyl 10 mcg/mL, Last Rate: 300 mcg/hr (06/14/24 0934)  midazolam, Last Rate: 5 mg/hr (06/14/24 0702)  norepinephrine, Last Rate: Stopped (06/14/24 1000)  Phoxillum BK4/2.5, Last Rate: 1,500 mL/hr (06/14/24 1149)    Respiratory Support: MV    Physical Examination:  Vital Signs: Blood pressure 98/44, pulse 98, temperature 97.6 °F (36.4 °C), temperature source Axillary, resp. rate 20, height 168.9 cm (66.5\"), weight 77.8 kg (171 lb 8.3 oz), SpO2 92%.    General: Critically ill-appearing. On mechanical ventilation.  ENT/Neck: ETT in place. No JVD.    Chest: Clear to auscultation bilaterally, No wheezing, rhonchi, or rales. Synchronous on mechanical ventilation. Equal chest rise.   Cardiac: Normal rate, S1S2 auscultated. Hypotensive. No murmurs, rubs or " gallops.   Abdomen: Soft but taught, no appreciable tenderness, minimally-distended [unchanged]  Extremities: 1+ lower extremity edema. No clubbing or cyanosis.   Neuro: Intubated, sedated.    Lines, Drains & Airways       Active LDAs       Name Placement date Placement time Site Days    CVC Triple Lumen 06/05/24 Non-tunneled Right Internal jugular 06/05/24  2130  Internal jugular  4    Peripheral IV 06/05/24 0723 Distal;Left;Posterior Forearm 06/05/24  0723  Forearm  5    Peripheral IV 06/05/24 1700 Anterior;Proximal;Right Forearm 06/05/24  1700  Forearm  4    NG/OG Tube Nasogastric 18 Fr Right nostril 06/05/24  1630  Right nostril  4    NG/OG Tube Nasoduodenal 10 Fr Left nostril 06/10/24  1000  Left nostril  less than 1    Urethral Catheter Non-latex;Temperature probe 16 Fr. 06/05/24  1629  -- 4    ETT  06/05/24  1404  created via procedure documentation  -- 4    Arterial Line 06/05/24 Right Radial 06/05/24  1700  Radial  4    Hemodialysis Cath Double with Pigtail 06/08/24  1230  Internal Jugular  2        Results from last 7 days   Lab Units 06/14/24  0502 06/14/24  0003 06/13/24 1957   WBC 10*3/mm3 17.29* 16.85* 15.24*   HEMOGLOBIN g/dL 7.6* 7.9* 7.7*   MCV fL 90.3 90.1 89.6   PLATELETS 10*3/mm3 138* 143 138*     Results from last 7 days   Lab Units 06/14/24  0806 06/14/24  0502 06/14/24  0003 06/13/24 1957 06/13/24  1533   SODIUM mmol/L 134* 133* 133*   < > 133*   POTASSIUM mmol/L 4.2 4.4 4.1   < > 4.2   CO2 mmol/L 22.0 21.0* 21.0*   < > 22.0   CREATININE mg/dL 1.62* 1.52* 1.64*   < > 1.46*   GLUCOSE mg/dL 88 94 90   < > 88   MAGNESIUM mg/dL 2.4  --  2.3  --  2.4   PHOSPHORUS mg/dL 4.5  --  4.3  --  4.4    < > = values in this interval not displayed.     Estimated Creatinine Clearance: 32.9 mL/min (A) (by C-G formula based on SCr of 1.62 mg/dL (H)).  Results from last 7 days   Lab Units 06/14/24  0502 06/12/24  0558 06/10/24  0551   ALK PHOS U/L 137* 163* 150*   BILIRUBIN mg/dL 1.1 0.5 0.3   ALT (SGPT) U/L <5  <5 <5   AST (SGOT) U/L 76* 74* 71*     Results from last 7 days   Lab Units 06/14/24  0404 06/13/24  0414 06/12/24  0320 06/11/24  0404 06/10/24  1505   PH, ARTERIAL pH units 7.208* 7.231* 7.236* 7.244* 7.220*   PCO2, ARTERIAL mm Hg 51.3* 51.1* 50.8* 47.6* 47.7*   PO2 ART mm Hg 69.7* 68.5* 61.1* 73.2* 78.0*   FIO2 % 100 80 65 65 100     Images:  XR Chest 1 View    Result Date: 6/14/2024  Impression: No significant interval change. Electronically Signed: Steven Ko MD  6/14/2024 6:24 AM EDT  Workstation ID: [id number]    CT Abdomen Pelvis With & Without Contrast    Result Date: 6/13/2024  1. Large left perinephric and retroperitoneal hematomas. Dominant retroperitoneal hematoma now measures approximately 13 x 11 x 9 cm which is slightly larger when compared with 6/5/2024. Left perinephric hematoma appears smaller since the prior study, now measuring approximately 2.5 cm in thickness, previously measuring up to 4.3 cm in thickness. Small air lucency within the perinephric hematoma may indicate interval percutaneous aspiration. Embolization coils are now seen along the periphery of the left kidney. No active contrast extravasation is identified.  2.Bilateral renal artery stents are noted. There has been placement of an additional left renal artery stent slightly distal to the existing stent. The stents are not overlapping, and there may be a residual high-grade stenosis between the stents (see series 900, images 87 through 96. Patency of the stents and the left renal artery branches is not well assessed on this study. There is persistent heterogeneous enhancement of the left kidney which may indicate hypoperfusion or multiple small infarcts. This finding is unchanged. If clinically indicated, conventional angiography may better assess the left renal artery for patency. 3.Bilateral lower lobe atelectasis or pneumonia with small bilateral pleural effusions. Secretions/debris noted within the bilateral lower lobe  bronchi. Patient may benefit from bronchoscopy. 4.Nonspecific 8 mm nodular groundglass focus within the left lung apex 5.Additional findings as detailed above. The above findings were discussed with  Anca Tompkins   via telephone on 6/13/2024 12:11 PM EDT. Electronically Signed: Leander Hartman MD  6/13/2024 12:15 PM EDT  Workstation ID: [id number]    CT Chest Without Contrast Diagnostic    Result Date: 6/13/2024  1. Large left perinephric and retroperitoneal hematomas. Dominant retroperitoneal hematoma now measures approximately 13 x 11 x 9 cm which is slightly larger when compared with 6/5/2024. Left perinephric hematoma appears smaller since the prior study, now measuring approximately 2.5 cm in thickness, previously measuring up to 4.3 cm in thickness. Small air lucency within the perinephric hematoma may indicate interval percutaneous aspiration. Embolization coils are now seen along the periphery of the left kidney. No active contrast extravasation is identified.  2.Bilateral renal artery stents are noted. There has been placement of an additional left renal artery stent slightly distal to the existing stent. The stents are not overlapping, and there may be a residual high-grade stenosis between the stents (see series 900, images 87 through 96. Patency of the stents and the left renal artery branches is not well assessed on this study. There is persistent heterogeneous enhancement of the left kidney which may indicate hypoperfusion or multiple small infarcts. This finding is unchanged. If clinically indicated, conventional angiography may better assess the left renal artery for patency. 3.Bilateral lower lobe atelectasis or pneumonia with small bilateral pleural effusions. Secretions/debris noted within the bilateral lower lobe bronchi. Patient may benefit from bronchoscopy. 4.Nonspecific 8 mm nodular groundglass focus within the left lung apex 5.Additional findings as detailed above. The above findings were  discussed with  Anca Tompkins   via telephone on 6/13/2024 12:11 PM EDT. Electronically Signed: Leander Hartman MD  6/13/2024 12:15 PM EDT  Workstation ID: [id number]    XR Chest 1 View    Result Date: 6/13/2024  Impression: No significant interval change. Electronically Signed: Steven Ko MD  6/13/2024 6:17 AM EDT  Workstation ID: [id number]     ECHO (6/09/2024):     Left ventricular ejection fraction appears to be 61 - 65%.    Left ventricular diastolic function was normal.    The aortic valve exhibits sclerosis.    Aortic valve maximum pressure gradient is 16 mmHg.    Estimated right ventricular systolic pressure from tricuspid regurgitation is mildly elevated (35-45 mmHg). Calculated right ventricular systolic pressure from tricuspid regurgitation is 41 mmHg.    Results: Reviewed.  - I reviewed the patient's new laboratory and imaging results.  - I independently reviewed the patient's new images.    Medications: Reviewed.    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     Patient Agustin is a 74F with past medical history coronary artery disease, peripheral vascular disease, hypertension, CKD III and previous renal artery stenosis with stenting who underwent endovascular repair today of a juxtarenal abdominal aortic aneurysm. Initial surgery was completed without complication however the patient began to become hemodynamically unstable and was found to have a hemoglobin of 6.6 down from 12.9 preoperatively. She was taken emergently back to the operating room and a leak from the  renal artery was repaired.    #Juxtarenal AAA  -Primary management per vascular surgery. Status post endovascular repair. Vascular surgery closely following  -Given lack of clinical improvement, increasing white count and shock --> STAT CT abdomen/pelvis (6/13)     #MICHAEL on CKD III  -Baseline creatinine 1.4-1.5   -Right atrophic kidney and left renal hemorrhage status post distal inferior branch embolization (6/05)   -CRRT per nephrology    #Hypotension   #Acute hypoxic respiratory failure requiring intubation/mechanical ventilation  -Mechanical ventilation adjustments and SBT assessment daily  -CXR, blood gas   -Given ongoing poor respiratory status (unable to titrate FiO2, PEEP despite CRRT), increase in WBC and worsening tachycardia over the last 48 hours--> repeating respiratory + blood cultures and started antibiotics empirically; Broadened to Zosyn on 6/13. Cultures NGTD  -Started empiric steroids (6/14)    #Acute blood loss anemia  -Transfuse RBC for hemoglobin less than 7; Last transfused on 6/09  -Holding ASA, Lovenox    #Hypothyroidism  -Continue levothyroxine    Family meeting scheduled (6/14) for goals of care. Palliative care consulted.     F-Tube feeds per dietary recs  A-Fentanyl  S-Versed  T-SQH  H-Head of bed greater than 30 degrees  U-Pepcid   G-FSBS per unit protocol, correction dose insulin  S-SBT assessment daily   B-Will monitor daily and provide regimen if indicated  I-CVC (6/05), Arterial line (6/05)   D-Zosyn    Advance Directives:   Code Status and Medical Interventions:   Ordered at: 06/06/24 0903     Level Of Support Discussed With:    Patient     Code Status (Patient has no pulse and is not breathing):    CPR (Attempt to Resuscitate)     Medical Interventions (Patient has pulse or is breathing):    Full Support     High level of risk due to severe exacerbation of chronic illness and illness with threat to life or bodily function.    I conducted multidisciplinary rounds in the plan of care  was discussed with the multidisciplinary team at that time. In attendance at multidisciplinary rounds was clinical pharmacist, dietitian, nursing staff and case management.    I discussed the patient's findings and my recommendations with patient, nursing staff, and consulting provider.      Critical Care Time: Critical Care time spent in direct patient care: 50 minutes (excluding procedure time, if applicable) including high complexity decision making to assess, manipulate, and support vital organ system failure in this individual who has impairment of one or more vital organ systems such that there is a high probability of imminent or life threatening deterioration in the patient’s condition.     -- Albert Morales MD  Pulmonary/Critical Care

## 2024-06-14 NOTE — PROGRESS NOTES
LOS: 9 days   Patient Care Team:  Frannie Couch MD as PCP - General      Subjective   POD#9 s/p EVAR, followed by emergent left renal artery embolization with stent placement for hemorrhage (6/5/24, Eric).  Hemoglobin low but has remained stable with initiation of subcutaneous heparin 6/12.  FiO2 %, PEEP remains at 14 with peak pressures in the 30s-40s. Abdomen remains distended, producing no urine. Requiring norepi for MAP>65.      History taken from: chart RN    Objective     Vital Signs  Temp:  [97.5 °F (36.4 °C)-99.4 °F (37.4 °C)] 97.5 °F (36.4 °C)  Heart Rate:  [] 89  Resp:  [16-20] 18  BP: ()/(39-66) 96/66  FiO2 (%):  [75 %-100 %] 100 %    Physical Exam:  Present at bedside: nursing and daughter at bedside  General: no acute distress, intubated and sedated  Respiratory: ETT tube secured, NG tube in place  Abdomen: firm, distended  Bilateral Groins: access sites c/d/I, soft, minimal surrounding ecchymosis, no evidence of hematoma  Extremities: warm and pink with trace edema, palpable DPA pulses bilaterally    Results Review:     I reviewed the patient's new clinical results.  CBC    Results from last 7 days   Lab Units 06/14/24  0502 06/14/24  0003 06/13/24 1957 06/13/24  1203 06/13/24  0545 06/13/24  0008 06/12/24  1800   WBC 10*3/mm3 17.29* 16.85* 15.24* 10.20 14.87* 13.45* 12.06*   HEMOGLOBIN g/dL 7.6* 7.9* 7.7* 6.6* 7.6* 7.3* 7.7*   PLATELETS 10*3/mm3 138* 143 138* 128* 144 129* 125*     BMP   Results from last 7 days   Lab Units 06/14/24  0806 06/14/24  0502 06/14/24  0003 06/13/24 1957 06/13/24  1533 06/13/24  1203 06/13/24  0753 06/13/24  0545 06/13/24  0008 06/12/24  1800 06/12/24  1313 06/12/24  0837   SODIUM mmol/L 134* 133* 133* 134* 133* 133* 134*   < > 134* 132*  134*   < > 133*   POTASSIUM mmol/L 4.2 4.4 4.1 4.0 4.2 4.3 4.6   < > 4.7 4.5  4.7   < > 4.7   CHLORIDE mmol/L 100 100 100 101 101 102 102   < > 103 99  102   < > 98   CO2 mmol/L 22.0 21.0* 21.0* 21.0* 22.0  22.0 21.0*   < > 21.0* 23.0  23.0   < > 22.0   BUN mg/dL 23 23 23 22 22 23 22   < > 22 19  19   < > 20   CREATININE mg/dL 1.62* 1.52* 1.64* 1.47* 1.46* 1.37* 1.25*   < > 1.29* 1.18*  1.22*   < > 1.34*   GLUCOSE mg/dL 88 94 90 82 88 96 99   < > 114* 108*  108*   < > 145*   MAGNESIUM mg/dL 2.4  --  2.3  --  2.4  --  2.4  --  2.3 2.4  --  2.8*   PHOSPHORUS mg/dL 4.5  --  4.3  --  4.4  --  4.5  --  4.7* 4.5  --  5.0*    < > = values in this interval not displayed.     CMP   Results from last 7 days   Lab Units 06/14/24  0806 06/14/24  0502 06/14/24  0003 06/13/24  1957 06/13/24  1533 06/13/24  1203 06/13/24  0753 06/13/24  0545 06/13/24  0008 06/12/24  1800 06/12/24  0837 06/12/24  0558 06/10/24  1211 06/10/24  0551   SODIUM mmol/L 134* 133* 133* 134* 133* 133* 134*   < > 134* 132*  134*   < > 135*   < > 137  137   POTASSIUM mmol/L 4.2 4.4 4.1 4.0 4.2 4.3 4.6   < > 4.7 4.5  4.7   < > 4.8   < > 5.4*  5.4*   CHLORIDE mmol/L 100 100 100 101 101 102 102   < > 103 99  102   < > 101   < > 102  102   CO2 mmol/L 22.0 21.0* 21.0* 21.0* 22.0 22.0 21.0*   < > 21.0* 23.0  23.0   < > 20.0*   < > 18.0*  18.0*   BUN mg/dL 23 23 23 22 22 23 22   < > 22 19  19   < > 20   < > 64*  64*   CREATININE mg/dL 1.62* 1.52* 1.64* 1.47* 1.46* 1.37* 1.25*   < > 1.29* 1.18*  1.22*   < > 1.37*   < > 5.85*  5.85*   GLUCOSE mg/dL 88 94 90 82 88 96 99   < > 114* 108*  108*   < > 112*   < > 109*  109*   ALBUMIN g/dL 3.5 3.6 3.1*  --  3.1*  --  3.0*  --  2.8* 2.9*   < > 3.1*   < > 2.8*   BILIRUBIN mg/dL  --  1.1  --   --   --   --   --   --   --   --   --  0.5  --  0.3   ALK PHOS U/L  --  137*  --   --   --   --   --   --   --   --   --  163*  --  150*   AST (SGOT) U/L  --  76*  --   --   --   --   --   --   --   --   --  74*  --  71*   ALT (SGPT) U/L  --  <5  --   --   --   --   --   --   --   --   --  <5  --  <5    < > = values in this interval not displayed.     ABG    Results from last 7 days   Lab Units 06/14/24  0404 06/13/24  0414  06/12/24  0320 06/11/24  0404 06/10/24  1505 06/10/24  0349 06/09/24  0356   PH, ARTERIAL pH units 7.208* 7.231* 7.236* 7.244* 7.220* 7.262* 7.284*   PCO2, ARTERIAL mm Hg 51.3* 51.1* 50.8* 47.6* 47.7* 42.2 37.5   PO2 ART mm Hg 69.7* 68.5* 61.1* 73.2* 78.0* 71.2* 65.6*   BASE EXCESS ART mmol/L -7.1* -5.8* -5.8* -6.5* -7.8* -7.5* -8.2*     Radiology(recent) XR Chest 1 View    Result Date: 6/14/2024  Impression: No significant interval change. Electronically Signed: Steven Ko MD  6/14/2024 6:24 AM EDT  Workstation ID: [id number]    CT Abdomen Pelvis With & Without Contrast    Result Date: 6/13/2024  1. Large left perinephric and retroperitoneal hematomas. Dominant retroperitoneal hematoma now measures approximately 13 x 11 x 9 cm which is slightly larger when compared with 6/5/2024. Left perinephric hematoma appears smaller since the prior study, now measuring approximately 2.5 cm in thickness, previously measuring up to 4.3 cm in thickness. Small air lucency within the perinephric hematoma may indicate interval percutaneous aspiration. Embolization coils are now seen along the periphery of the left kidney. No active contrast extravasation is identified.  2.Bilateral renal artery stents are noted. There has been placement of an additional left renal artery stent slightly distal to the existing stent. The stents are not overlapping, and there may be a residual high-grade stenosis between the stents (see series 900, images 87 through 96. Patency of the stents and the left renal artery branches is not well assessed on this study. There is persistent heterogeneous enhancement of the left kidney which may indicate hypoperfusion or multiple small infarcts. This finding is unchanged. If clinically indicated, conventional angiography may better assess the left renal artery for patency. 3.Bilateral lower lobe atelectasis or pneumonia with small bilateral pleural effusions. Secretions/debris noted within the bilateral lower  lobe bronchi. Patient may benefit from bronchoscopy. 4.Nonspecific 8 mm nodular groundglass focus within the left lung apex 5.Additional findings as detailed above. The above findings were discussed with  Anca Tompkins   via telephone on 6/13/2024 12:11 PM EDT. Electronically Signed: Leander Hartman MD  6/13/2024 12:15 PM EDT  Workstation ID: [id number]    CT Chest Without Contrast Diagnostic    Result Date: 6/13/2024  1. Large left perinephric and retroperitoneal hematomas. Dominant retroperitoneal hematoma now measures approximately 13 x 11 x 9 cm which is slightly larger when compared with 6/5/2024. Left perinephric hematoma appears smaller since the prior study, now measuring approximately 2.5 cm in thickness, previously measuring up to 4.3 cm in thickness. Small air lucency within the perinephric hematoma may indicate interval percutaneous aspiration. Embolization coils are now seen along the periphery of the left kidney. No active contrast extravasation is identified.  2.Bilateral renal artery stents are noted. There has been placement of an additional left renal artery stent slightly distal to the existing stent. The stents are not overlapping, and there may be a residual high-grade stenosis between the stents (see series 900, images 87 through 96. Patency of the stents and the left renal artery branches is not well assessed on this study. There is persistent heterogeneous enhancement of the left kidney which may indicate hypoperfusion or multiple small infarcts. This finding is unchanged. If clinically indicated, conventional angiography may better assess the left renal artery for patency. 3.Bilateral lower lobe atelectasis or pneumonia with small bilateral pleural effusions. Secretions/debris noted within the bilateral lower lobe bronchi. Patient may benefit from bronchoscopy. 4.Nonspecific 8 mm nodular groundglass focus within the left lung apex 5.Additional findings as detailed above. The above findings were  discussed with  Anca Tompkins   via telephone on 6/13/2024 12:11 PM EDT. Electronically Signed: Leander Hartman MD  6/13/2024 12:15 PM EDT  Workstation ID: [id number]    XR Chest 1 View    Result Date: 6/13/2024  Impression: No significant interval change. Electronically Signed: Steven Ko MD  6/13/2024 6:17 AM EDT  Workstation ID: [id number]        Current Facility-Administered Medications:     budesonide (PULMICORT) nebulizer solution 0.5 mg, 0.5 mg, Nebulization, BID - RT, Karan Munroe, APRN, 0.5 mg at 06/14/24 0723    Calcium Replacement - Follow Nurse / BPA Driven Protocol, , Does not apply, PRN, Michell Ugarte MD    castor oil-balsam peru (VENELEX) ointment 1 Application, 1 Application, Topical, Q12H, Theodore Reyes MD, 1 Application at 06/14/24 0816    chlorhexidine (PERIDEX) 0.12 % solution 15 mL, 15 mL, Mouth/Throat, Q12H, Michell Ugarte MD, 15 mL at 06/14/24 0820    dextrose (D5W) 5 % infusion, 50 mL/hr, Intravenous, Continuous, Rola Weldon DNP, APRN, Last Rate: 50 mL/hr at 06/13/24 2150, 50 mL/hr at 06/13/24 2150    diazePAM (VALIUM) tablet 5 mg, 5 mg, Nasogastric, Daily PRN, Saúl Cao MD    famotidine (PEPCID) injection 20 mg, 20 mg, Intravenous, Daily, Michell Ugarte MD, 20 mg at 06/14/24 0820    fentaNYL (SUBLIMAZE) bolus from bag 10 mcg/mL injection 50 mcg, 50 mcg, Intravenous, Q30 Min PRN, Stephane Morales MD, 50 mcg at 06/13/24 1153    fentaNYL 2500 mcg/250 mL NS infusion,  mcg/hr, Intravenous, Titrated, Scott Perkins MD, Last Rate: 30 mL/hr at 06/14/24 0150, 300 mcg/hr at 06/14/24 0150    heparin (porcine) 5000 UNIT/ML injection 5,000 Units, 5,000 Units, Subcutaneous, Q8H, Theodore Reyes MD, 5,000 Units at 06/14/24 0502    heparin (porcine) injection 1,000-2,000 Units, 1,000-2,000 Units, Intravenous, PRN, Serafin Junior MD    insulin regular (humuLIN R,novoLIN R) injection 2-9 Units, 2-9 Units, Subcutaneous, Q6H, Karan Munroe, APRN, 2 Units  at 24 1738    ipratropium-albuterol (DUO-NEB) nebulizer solution 3 mL, 3 mL, Nebulization, Q4H PRN, Yesenia Osorio APRN, 3 mL at 24 2237    ipratropium-albuterol (DUO-NEB) nebulizer solution 3 mL, 3 mL, Nebulization, Q6H - RT, Karan Munroe APRN, 3 mL at 24 0723    levothyroxine (SYNTHROID, LEVOTHROID) tablet 100 mcg, 100 mcg, Nasogastric, Q AM, Saúl Cao MD, 100 mcg at 24 0502    Magnesium Low Dose Replacement - Follow Nurse / BPA Driven Protocol, , Does not apply, PRN, Michell Ugarte MD    metoclopramide (REGLAN) injection 5 mg, 5 mg, Intravenous, Q8H, Theodore Reyes MD, 5 mg at 24 0502    midazolam (VERSED) 100 mg in 100mL NS infusion, 1-10 mg/hr, Intravenous, Titrated, Karan Munroe APRN, Last Rate: 5 mL/hr at 24 0702, 5 mg/hr at 24 0702    midazolam (VERSED) bolus from bag 1 mg/mL solution 1 mg, 1 mg, Intravenous, Q30 Min PRN, Karan Munroe APRN    [] morphine injection 1 mg, 1 mg, Intravenous, Q4H PRN **AND** naloxone (NARCAN) injection 0.4 mg, 0.4 mg, Intravenous, Q5 Min PRN, Saúl Cao MD    nitroglycerin (NITROSTAT) SL tablet 0.4 mg, 0.4 mg, Sublingual, Q5 Min PRN, Theodore Reyes MD    norepinephrine (LEVOPHED) 8 mg in 250 mL NS infusion (premix), 0.02-0.3 mcg/kg/min, Intravenous, Titrated, Karan Munroe APRN, Last Rate: 3.06 mL/hr at 24 0907, 0.02 mcg/kg/min at 24 0907    ondansetron (ZOFRAN) injection 4 mg, 4 mg, Intravenous, Q6H PRN, Michell Ugarte MD    Phosphorus Replacement - Follow Nurse / BPA Driven Protocol, , Does not apply, PRN, Michell Ugarte MD    Phoxillum BK4/2.5 dialysis solution, 1,500 mL/hr, CRRT, Continuous, Serafin Junior MD, Last Rate: 1,500 mL/hr at 24 0829, 1,500 mL/hr at 24 0829    piperacillin-tazobactam (ZOSYN) 4.5 g IVPB in 100 mL NS MBP (CD), 4.5 g, Intravenous, Q8H, Stephane Morales MD, 4.5 g at 24 0501    polyethylene glycol  (MIRALAX) packet 17 g, 17 g, Nasogastric, Daily, Saúl Cao MD, 17 g at 06/14/24 0821    polyvinyl alcohol (LIQUIFILM) 1.4 % ophthalmic solution 2 drop, 2 drop, Both Eyes, Q1H PRN, Michell Ugarte MD    Potassium Replacement - Follow Nurse / BPA Driven Protocol, , Does not apply, PRN, Michell Ugarte MD    ProSource No Carb oral solution 30 mL, 30 mL, Nasogastric, 5x Daily, Stephane Morales MD, 30 mL at 06/12/24 1805    sertraline (ZOLOFT) tablet 200 mg, 200 mg, Nasogastric, Daily, Saúl Cao MD, 200 mg at 06/14/24 0820    sodium chloride 0.9 % flush 10 mL, 10 mL, Intravenous, Q12H, Michell Ugarte MD, 10 mL at 06/14/24 0816    sodium chloride 0.9 % flush 10 mL, 10 mL, Intravenous, PRN, Michell Ugarte MD    sodium chloride 0.9 % infusion 40 mL, 40 mL, Intravenous, PRN, Michell Ugarte MD     Assessment & Plan   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  Large-bore bilateral femoral sheath placement for endovascular graft deployment.  Right 22 Arabic and left 12 Arabic  3.  Bifurcated endovascular repair of aortic aneurysm extending into the right external iliac artery and left common iliac artery  - 6/5/24 (Eric):  1.  Left renal distal inferior artery embolization for hemorrhage  2.  Graftmaster covered stent placement for distal renal artery thrombus versus dissection     - Labs reviewed: Hgb 7.6, received 7units pRBC 6/5, required 1 unit pRBC 6/9, WBC 17.29 (16.85)  - CXR reviewed and stable   - CT reviewed demonstrating stable hematomas, Dr. Reyes reviewed  - SBP recommendations <160  - continue sq heparin 5000u TID, asa held   - IV fluids  - tube feedings held due to high residuals, leave NG in place, corpack for distal feeding  - Reglan 5mg TID to continue until able to tolerate tube feedings  - Pulm/CC following, appreciate assistance   - Nephrology  following for worsening renal function, CRRT continued, appreciate assistance   - Recommend against increase in pressors at this time      Patient's case was reviewed in detail with Dr. Reyes who directed the above plan of care. Plan reviewed with nursing at bedside who verbalized understanding and agreement.     Cherri Gutierrez PA-C  06/14/24  09:08 EDT

## 2024-06-14 NOTE — PLAN OF CARE
Goal Outcome Evaluation:  Plan of Care Reviewed With: patient, family        Progress: declining  Outcome Evaluation: Pt continues on MV fio2 %, PEEP 14. Peak pressures 30's-40's. Sedated on fentanyl; versed gtt added for vent synchrony. Albumin given x1 for hypotension. CVVHD UF 0-150; CRRT filter change x1 d/t increased filter pressure and tmp/pd. Requiring norepi for MAP >65.         Problem: Adult Inpatient Plan of Care  Goal: Plan of Care Review  Outcome: Ongoing, Not Progressing

## 2024-06-14 NOTE — PLAN OF CARE
Goal Outcome Evaluation:  Plan of Care Reviewed With: patient        Progress: no change  Outcome Evaluation: Pt remains intubated and sedated on CRRT. Palliative care consulted for GOC. Multiple GOC discusses with Dr. Morales and palliative. Family has made the decision to make the patient DNR. FiO2 has been weaned from 100% to 90%. IV steroids started. Started on continuous neb. Continues on D5. BG is 90's to 120s. Dr. Reyes spoke with family this evening. Tmax 98. Family updated throughout shift.

## 2024-06-14 NOTE — PROGRESS NOTES
" LOS: 9 days    Patient Care Team:  Frannie Couch MD as PCP - General    Reason For Visit: Acute renal failure.     Subjective     Intubated sedated on 1 pressor. Remains critically ill. On CRRT UF ~ 150cc/hr. Requiring higher vent support on PEEP 14. ABG showed worsening hypoxia     Objective     budesonide, 0.5 mg, Nebulization, BID - RT  castor oil-balsam peru, 1 Application, Topical, Q12H  chlorhexidine, 15 mL, Mouth/Throat, Q12H  famotidine, 20 mg, Intravenous, Daily  heparin (porcine), 5,000 Units, Subcutaneous, Q8H  insulin regular, 2-9 Units, Subcutaneous, Q6H  ipratropium-albuterol, 3 mL, Nebulization, Q6H - RT  levothyroxine, 100 mcg, Nasogastric, Q AM  methylPREDNISolone sodium succinate, 60 mg, Intravenous, Q8H  metoclopramide, 5 mg, Intravenous, Q8H  piperacillin-tazobactam, 4.5 g, Intravenous, Q8H  polyethylene glycol, 17 g, Nasogastric, Daily  sertraline, 200 mg, Nasogastric, Daily  sodium chloride, 10 mL, Intravenous, Q12H      dextrose, 50 mL/hr, Last Rate: 50 mL/hr (06/13/24 2150)  fentanyl 10 mcg/mL,  mcg/hr, Last Rate: 300 mcg/hr (06/14/24 0934)  midazolam, 1-10 mg/hr, Last Rate: 5 mg/hr (06/14/24 0702)  norepinephrine, 0.02-0.3 mcg/kg/min, Last Rate: Stopped (06/14/24 1000)  Phoxillum BK4/2.5, 1,500 mL/hr, Last Rate: 1,500 mL/hr (06/14/24 1149)      Vital Signs:  Blood pressure 98/44, pulse 98, temperature 97.6 °F (36.4 °C), temperature source Axillary, resp. rate 20, height 168.9 cm (66.5\"), weight 77.8 kg (171 lb 8.3 oz), SpO2 92%.    Flowsheet Rows      Flowsheet Row First Filed Value   Admission Height 168.9 cm (66.5\") Documented at 06/05/2024 0740   Admission Weight 65.4 kg (144 lb 1.1 oz) Documented at 06/05/2024 0740            06/13 0701 - 06/14 0700  In: 3826.4 [I.V.:2811.4]  Out: 5174     Physical Exam:  GENERAL: Intubated and sedated.   HEENT: Normocephalic, atraumatic.    NECK: Supple   HEART: RRR; No murmur, rubs, gallops. Trace edema  LUNGS: Mechanical breath sounds. "   ABDOMEN: Soft, nontender, nondistended..  EXT:  ++  edema.  : no Rios   SKIN: Warm and dry without rash  NEURO: Unable to assess  PSYCHIATRIC: Unable to assess    Labs:  Results from last 7 days   Lab Units 06/14/24  0502 06/14/24  0003 06/13/24 1957   WBC 10*3/mm3 17.29* 16.85* 15.24*   HEMOGLOBIN g/dL 7.6* 7.9* 7.7*   HEMATOCRIT % 24.3* 25.5* 24.1*   PLATELETS 10*3/mm3 138* 143 138*     Results from last 7 days   Lab Units 06/14/24  0806 06/14/24  0502 06/14/24  0003 06/13/24 1957 06/13/24  1533 06/13/24  1203 06/13/24  0753   SODIUM mmol/L 134* 133* 133* 134* 133*   < > 134*   POTASSIUM mmol/L 4.2 4.4 4.1 4.0 4.2   < > 4.6   CHLORIDE mmol/L 100 100 100 101 101   < > 102   CO2 mmol/L 22.0 21.0* 21.0* 21.0* 22.0   < > 21.0*   BUN mg/dL 23 23 23 22 22   < > 22   CREATININE mg/dL 1.62* 1.52* 1.64* 1.47* 1.46*   < > 1.25*   CALCIUM mg/dL 8.3* 8.2* 8.3* 8.0* 8.2*   < > 8.3*   PHOSPHORUS mg/dL 4.5  --  4.3  --  4.4  --  4.5   MAGNESIUM mg/dL 2.4  --  2.3  --  2.4  --  2.4   ALBUMIN g/dL 3.5 3.6 3.1*  --  3.1*  --  3.0*    < > = values in this interval not displayed.     Results from last 7 days   Lab Units 06/14/24  0806   GLUCOSE mg/dL 88       Results from last 7 days   Lab Units 06/14/24  0502   ALK PHOS U/L 137*   BILIRUBIN mg/dL 1.1   ALT (SGPT) U/L <5   AST (SGOT) U/L 76*     Results from last 7 days   Lab Units 06/14/24  0404   PH, ARTERIAL pH units 7.208*   PO2 ART mm Hg 69.7*   PCO2, ARTERIAL mm Hg 51.3*   HCO3 ART mmol/L 20.4         Estimated Creatinine Clearance: 32.9 mL/min (A) (by C-G formula based on SCr of 1.62 mg/dL (H)).    Chest  Xray:   IMPRESSION:  Impression:  Worsening small right-sided pleural effusion with probable overlying atelectasis. Mild retraction of the endotracheal tube with the tip in the proximal to mid trachea     CT abdomen:   Impression:  1. Left renal artery stent with high-grade stenosis just distal to the stent, possibly from a dissection flap.  2. Large active  extravasation from the mid pole left renal cortex with a large pararenal and retroperitoneal hematoma.   3. Left lower lobe atelectasis, which was noted at time of dictation. These findings were being communicated to the OR.  4. Abdominal aortic aneurysm status post endograft repair.    Assessment     Acute renal failure  Abdominal aortic aneurysm repair 6/5  Left renal hemorrhage s/p distal inferior branch embolization  Right atrophic kidney  Acute blood loss anemia  Hypertension   Dyslipidemia  Coronary artery disease    PLAN:     Acute renal failure on CKD stage III:   - Baseline renal function 1.4-1.5.  Patient follows up with Dr. Brown.  - Right atrophic kidney.  Left renal hemorrhage s/p distal inferior branch embolization 6/5.   - Urine output minimal.  CRRT initiated on 6/8.  -Continue CRRT for now.  Patient is anuric.   - Strict ROBERTO's.   - Dose meds to GFR   - Avoid nephrotoxins     Metabolic acidosis:   - Managed with dialysis.     Acute blood loss anemia:   -S/p 8 units of PRBC   -Transfuse as needed to keep hemoglobin above 7.      Abdominal aortic aneurysm repair 6/5:   -Vascular following.      Recs  CRRT working well on CVVHD setting.   Continue with CRRT. Increase UF ~ 200 cc/hr.   Pending C discussion among family and palliative  Prognosis is guarded     High risk and critically ill patient    Patrick Ratliff MD  06/14/24  11:50 EDT

## 2024-06-14 NOTE — CONSULTS
Palliative Care Initial Consult     Attending Physician: Theodore Reyes MD  Referring Provider: Dr. Reyes    assistance with clarification of goals of care  Code Status:   Code Status and Medical Interventions:   Ordered at: 06/06/24 0903     Level Of Support Discussed With:    Patient     Code Status (Patient has no pulse and is not breathing):    CPR (Attempt to Resuscitate)     Medical Interventions (Patient has pulse or is breathing):    Full Support      Advanced Directives: Advance Directive Status: Patient does not have advance directive   Healthcare surrogate: Daughter's      HPI:  74-year-old female with coronary artery disease, carotid disease, hypertension, hyperlipidemia, depression, renal artery stenosis, and depression was hospitalized for repair of an aortic aneurysm and has had multiple complications, now intubated, sedated, and with multiorgan failure.  She is on pressors, CRRT, and most recently required increased ventilatory support with increased settings and worsening hypoxemia.  We were asked to discuss goals of care with her family.  She has 2 daughters at bedside, 1 of which lives with her.  We reviewed her medical condition, overall poor prognosis and one of the daughters is ready to change her CODE STATUS and consider comfort measures but the other daughter who lives with the patient is not.  She is tearful, and having a hard time accepting her mother's prognosis.  They asked if there was a time limit that we could continue present treatment.  I explained to them that she is requiring increased support, and is being kept alive on present life-sustaining measures.  One of the daughter states that her mother would not want this and had requested no life-sustaining measures but the other daughter was not ready to commit to any changes.        ROS: Negative except as above in HPI.     Past Medical History:   Diagnosis Date    Anxiety     Carotid artery disease     Coronary artery disease      Depression     Dyslipidemia     Eczema     GERD (gastroesophageal reflux disease)     Hypertension     Hypothyroidism     Hypothyroidism with nodule.    Peptic ulcer disease     Renal artery stenosis      Past Surgical History:   Procedure Laterality Date    ABDOMINAL AORTIC ANEURYSM REPAIR N/A 6/5/2024    Procedure: ENDOVASCULAR REPAIR OF AORTIC ANEURYSM with fenestration of the left renal artery, Large-bore bilateral femoral sheath placement for endovascular graft deployment.  Right 22 [place] and left 12 [place], Bifurcated endovascular repair of aortic aneurysm extending into the right external iliac artery and left common iliac artery;  Surgeon: Theodore Reyes MD;  Location: [facility] HYBRID OR;  Servi    ABDOMINAL AORTIC ANEURYSM REPAIR N/A 6/5/2024    Procedure: renal distal inferior artery embolization for hemorrhage left, Graftmaster covered stent placement for distal renal artery thrombus versus dissection;  Surgeon: Theodore Reyes MD;  Location: [facility] HYBRID OR;  Service: Vascular;  Laterality: N/A;  4086 mGy 46.36 fl 100ml visi 320    CAROTID ENDARTERECTOMY Left 2006    Left carotid endarterectomy by Dr. Ramirez, 2006.     CATARACT EXTRACTION, BILATERAL      CERVICAL SPINE SURGERY      CHOLECYSTECTOMY      COLONOSCOPY      CORONARY ANGIOPLASTY      History of catheter-based interventions; incomplete database.     HYSTERECTOMY      RENAL ARTERY STENT Bilateral 2012    Status post bilateral renal artery stenting by Dr. Landis in 2012.  Right renal artery stented with a 4 mm stent.  Left renal was treated with a 5.0 x 15 mm bare-metal stent.      TOTAL HIP ARTHROPLASTY Left 06/2017     Social History     Socioeconomic History    Marital status:    Tobacco Use    Smoking status: Every Day     Current packs/day: 1.00     Average packs/day: 1 pack/day for 48.5 years (48.5 ttl pk-yrs)     Types: Cigarettes     Start date: 1976    Smokeless tobacco: Never   Vaping Use    Vaping status: Never Used   Substance and  Sexual Activity    Alcohol use: No    Drug use: No    Sexual activity: Defer     Family History   Problem Relation Age of Onset    Heart failure Mother     Cancer Mother     Heart disease Mother     Coronary artery disease Father     Heart disease Sister     Heart disease Brother     Coronary artery disease Brother     Heart attack Brother     No Known Problems Maternal Grandmother     No Known Problems Maternal Grandfather     No Known Problems Paternal Grandmother     No Known Problems Paternal Grandfather     Heart failure Brother     Coronary artery disease Brother     Heart disease Brother     Coronary artery disease Sister     Coronary artery disease Sister     Coronary artery disease Sister     Coronary artery disease Sister        Allergies   Allergen Reactions    Codeine Nausea And Vomiting       Current Facility-Administered Medications   Medication Dose Route Frequency Provider Last Rate Last Admin    budesonide (PULMICORT) nebulizer solution 0.5 mg  0.5 mg Nebulization BID - RT Karan Munroe APRN   0.5 mg at 06/14/24 0723    Calcium Replacement - Follow Nurse / BPA Driven Protocol   Does not apply PRN Michell Ugarte MD        castor oil-balsam peru (VENELEX) ointment 1 Application  1 Application Topical Q12H Theodore Reyes MD   1 Application at 06/14/24 0816    chlorhexidine (PERIDEX) 0.12 % solution 15 mL  15 mL Mouth/Throat Q12H Michell Ugarte MD   15 mL at 06/14/24 0820    dextrose (D5W) 5 % infusion  50 mL/hr Intravenous Continuous Rola Weldon DNP, APRN 50 mL/hr at 06/13/24 2150 50 mL/hr at 06/13/24 2150    diazePAM (VALIUM) tablet 5 mg  5 mg Nasogastric Daily PRN Saúl Cao MD        famotidine (PEPCID) injection 20 mg  20 mg Intravenous Daily Michell Ugarte MD   20 mg at 06/14/24 0820    fentaNYL (SUBLIMAZE) bolus from bag 10 mcg/mL injection 50 mcg  50 mcg Intravenous Q30 Min PRN Stephane Morales MD   50 mcg at 06/13/24 1153    fentaNYL 2500 mcg/250 mL NS  infusion   mcg/hr Intravenous Titrated Scott Perkins MD 30 mL/hr at 06/14/24 0934 300 mcg/hr at 06/14/24 0934    heparin (porcine) 5000 UNIT/ML injection 5,000 Units  5,000 Units Subcutaneous Q8H Theodore Reyes MD   5,000 Units at 06/14/24 0502    heparin (porcine) injection 1,000-2,000 Units  1,000-2,000 Units Intravenous PRN Serafin Junior MD        insulin regular (humuLIN R,novoLIN R) injection 2-9 Units  2-9 Units Subcutaneous Q6H Karan Munroe APRN   2 Units at 06/06/24 1738    ipratropium-albuterol (DUO-NEB) nebulizer solution 3 mL  3 mL Nebulization Q4H PRN Yesenia Osorio APRN   3 mL at 06/11/24 2237    ipratropium-albuterol (DUO-NEB) nebulizer solution 3 mL  3 mL Nebulization Q6H - RT Karan Munroe APRN   3 mL at 06/14/24 1231    levothyroxine (SYNTHROID, LEVOTHROID) tablet 100 mcg  100 mcg Nasogastric Q AM Saúl Cao MD   100 mcg at 06/14/24 0502    Magnesium Low Dose Replacement - Follow Nurse / BPA Driven Protocol   Does not apply PRN Michell Ugarte MD        methylPREDNISolone sodium succinate (SOLU-Medrol) injection 60 mg  60 mg Intravenous Q8H Stephane Morales MD   60 mg at 06/14/24 1009    metoclopramide (REGLAN) injection 5 mg  5 mg Intravenous Q8H Theodore Reyes MD   5 mg at 06/14/24 0502    midazolam (VERSED) 100 mg in 100mL NS infusion  1-10 mg/hr Intravenous Titrated Karan Munroe APRN 5 mL/hr at 06/14/24 0702 5 mg/hr at 06/14/24 0702    midazolam (VERSED) bolus from bag 1 mg/mL solution 1 mg  1 mg Intravenous Q30 Min PRN Karan Munroe APRN        naloxone (NARCAN) injection 0.4 mg  0.4 mg Intravenous Q5 Min PRN Saúl Cao MD        nitroglycerin (NITROSTAT) SL tablet 0.4 mg  0.4 mg Sublingual Q5 Min PRN Theodore Reyes MD        norepinephrine (LEVOPHED) 8 mg in 250 mL NS infusion (premix)  0.02-0.3 mcg/kg/min Intravenous Titrated Karan Munroe APRN 6.13 mL/hr at 06/14/24 1300 0.04 mcg/kg/min at 06/14/24 1300    ondansetron  (ZOFRAN) injection 4 mg  4 mg Intravenous Q6H PRN Michell Ugarte MD        Phosphorus Replacement - Follow Nurse / BPA Driven Protocol   Does not apply PRN Michell Ugarte MD        Phoxillum BK4/2.5 dialysis solution  1,500 mL/hr CRRT Continuous Serafin Junior MD 1,500 mL/hr at 24 1149 1,500 mL/hr at 24 1149    piperacillin-tazobactam (ZOSYN) 4.5 g IVPB in 100 mL NS MBP (CD)  4.5 g Intravenous Q8H Stephane Morales MD   4.5 g at 24 0501    polyethylene glycol (MIRALAX) packet 17 g  17 g Nasogastric Daily Saúl Cao MD   17 g at 24 0821    polyvinyl alcohol (LIQUIFILM) 1.4 % ophthalmic solution 2 drop  2 drop Both Eyes Q1H PRN Michell Ugarte MD        Potassium Replacement - Follow Nurse / BPA Driven Protocol   Does not apply PRN Michell Ugarte MD        sertraline (ZOLOFT) tablet 200 mg  200 mg Nasogastric Daily Saúl Cao MD   200 mg at 24 0820    sodium chloride 0.9 % flush 10 mL  10 mL Intravenous Q12H Michell Ugarte MD   10 mL at 24 0816    sodium chloride 0.9 % flush 10 mL  10 mL Intravenous PRN Michell Ugarte MD        sodium chloride 0.9 % infusion 40 mL  40 mL Intravenous PRN Michell Ugarte MD         dextrose, 50 mL/hr, Last Rate: 50 mL/hr (24 2150)  fentanyl 10 mcg/mL,  mcg/hr, Last Rate: 300 mcg/hr (24 0934)  midazolam, 1-10 mg/hr, Last Rate: 5 mg/hr (24 0702)  norepinephrine, 0.02-0.3 mcg/kg/min, Last Rate: 0.04 mcg/kg/min (24 1300)  Phoxillum BK4/2.5, 1,500 mL/hr, Last Rate: 1,500 mL/hr (24 1149)        Calcium Replacement - Follow Nurse / BPA Driven Protocol    diazePAM    fentaNYL    heparin (porcine)    ipratropium-albuterol    Magnesium Low Dose Replacement - Follow Nurse / BPA Driven Protocol    midazolam    [] Morphine **AND** naloxone    nitroglycerin    ondansetron    Phosphorus Replacement - Follow Nurse / BPA Driven Protocol    polyvinyl  "alcohol    Potassium Replacement - Follow Nurse / BPA Driven Protocol    sodium chloride    sodium chloride    Current medication reviewed for route, type, dose and frequency and are current per MAR.    Palliative Performance Scale Score: 10%   BP (!) 97/38 (BP Location: Left arm, Patient Position: Lying)   Pulse 103   Temp 98 °F (36.7 °C) (Axillary)   Resp 19   Ht 168.9 cm (66.5\")   Wt 77.8 kg (171 lb 8.3 oz)   SpO2 93%   BMI 27.27 kg/m²     Intake/Output Summary (Last 24 hours) at 6/14/2024 1358  Last data filed at 6/14/2024 1300  Gross per 24 hour   Intake 2886.12 ml   Output 5487 ml   Net -2600.88 ml       PE:  General Appearance:  Vented, sedated   HEENT:    NC/AT   Neck:   supple   Lungs:   Coarse rhonchi bilaterally    Heart:    RRR   Abdomen:     Soft, nondistended   Extremities: 2+ edema   Pulses:   Pulses weak   Skin:   Warm, dry   Neurologic: Sedated           Labs:   Results from last 7 days   Lab Units 06/14/24  1147   WBC 10*3/mm3 16.36*   HEMOGLOBIN g/dL 7.7*   HEMATOCRIT % 25.3*   PLATELETS 10*3/mm3 142     Results from last 7 days   Lab Units 06/14/24  1147   SODIUM mmol/L 135*   POTASSIUM mmol/L 4.1   CHLORIDE mmol/L 101   CO2 mmol/L 21.0*   BUN mg/dL 22   CREATININE mg/dL 1.53*   GLUCOSE mg/dL 94   CALCIUM mg/dL 8.5*     Results from last 7 days   Lab Units 06/14/24  1147 06/14/24  0806 06/14/24  0502   SODIUM mmol/L 135*   < > 133*   POTASSIUM mmol/L 4.1   < > 4.4   CHLORIDE mmol/L 101   < > 100   CO2 mmol/L 21.0*   < > 21.0*   BUN mg/dL 22   < > 23   CREATININE mg/dL 1.53*   < > 1.52*   CALCIUM mg/dL 8.5*   < > 8.2*   BILIRUBIN mg/dL  --   --  1.1   ALK PHOS U/L  --   --  137*   ALT (SGPT) U/L  --   --  <5   AST (SGOT) U/L  --   --  76*   GLUCOSE mg/dL 94   < > 94    < > = values in this interval not displayed.     Imaging Results (Last 72 Hours)       Procedure Component Value Units Date/Time    XR Chest 1 View [[id number]] Collected: 06/14/24 0624     Updated: 06/14/24 0628    Narrative: "      XR CHEST 1 VW    Date of Exam: 6/14/2024 3:48 AM EDT    Indication: f/u respiratory illness.    Comparison: 1 day prior.    Findings:  Support hardware projects unchanged. Small bilateral layering pleural effusions are present with adjacent atelectasis. There is no distinct pneumothorax. Unchanged heart and mediastinal contours.      Impression:      Impression:  No significant interval change.      Electronically Signed: Steven Ko MD    6/14/2024 6:24 AM EDT    Workstation ID: [id number]    CT Abdomen Pelvis With & Without Contrast [[id number]] Collected: 06/13/24 1137     Updated: 06/13/24 1218    Narrative:      CT ABDOMEN PELVIS W WO CONTRAST, CT CHEST WO CONTRAST DIAGNOSTIC    Date of Exam: 6/13/2024 11:01 AM EDT    Indication: shock.    Comparison: 6/5/2024    Technique:   Axial CT images were obtained of the chest without contrast administration.  Reconstructed coronal and sagittal images were also obtained. Automated exposure control and iterative construction methods were used.    Axial CT images were obtained of the abdomen and pelvis before and after the uneventful intravenous administration of 100 mL Isovue 300. Sagittal and coronal reconstructions were performed.  Automated exposure control and iterative reconstruction methods   were used.    FINDINGS:      Thoracic inlet: Unremarkable.    Great vessels: Atherosclerotic calcifications are seen within the thoracic aorta and proximal arch vessels. Bilateral internal jugular venous catheters terminate within the SVC.    Mediastinum/Rae: No pathologically enlarged mediastinal lymph nodes are seen. Enteric tubes are seen within the esophagus. The esophagus is otherwise unremarkable.    Lung parenchyma: Small bilateral pleural effusions with bilateral lower lobe atelectasis or pneumonia. Nonspecific 8 mm nodular groundglass focus within the left lung apex. No pneumothorax is seen.    Trachea and airways: Endotracheal tube terminates within the upper  trachea. There appears to be secretions/mucous plugging within the bilateral lower lobe bronchi.    Heart and pericardium: Coronary artery calcifications. Otherwise, the heart and pericardium appear unremarkable    Liver: The liver is unremarkable in morphology. No focal liver lesion is seen. There is mild biliary dilation which may be related to prior cholecystectomy.    Gallbladder: Surgically absent.    Pancreas: Grossly unremarkable.    Spleen: Unremarkable.    Adrenal glands: Left adrenal gland is not well visualized due to adjacent hematoma. Right adrenal gland is grossly unremarkable.    Genitourinary tract: Large left perinephric and retroperitoneal hematomas. Dominant retroperitoneal hematoma now measures approximately 13 x 11 x 9 cm which is slightly larger when compared with 6/5/2024. Left perinephric hematoma appears smaller since   the prior study, now measuring approximately 2.5 cm in thickness, previously measuring up to 4.3 cm in thickness. Small air lucency within the perinephric hematoma may indicate interval percutaneous aspiration. Embolization coils are now seen along the   periphery of the left kidney. No active contrast extravasation is identified. Focal left renal hypodensity may represent a small infarct. Right kidney is atrophic with small cysts and subcentimeter hypodensities too small to characterize. No   hydronephrosis is seen. Left ureter is not well visualized. Visualized right ureter is unremarkable. Urinary bladder is not well visualized and may be empty.    Gastrointestinal tract: The visualized hollow viscera demonstrate no focal lesion. There is no evidence of bowel obstruction. There are 2 enteric tubes which terminate within the the distal stomach.    Appendix: The appendix is not identified.    Other findings: No free air is identified. Patient has undergone aortobiiliac stent graft repair of an abdominal aortic aneurysm. Lobulated soft tissue density about the infrarenal  aorta may represent small thrombosed/excluded saccular aneurysm sacs   (series 5, images 58 through 61). This appears similar since the prior study.     Bilateral renal artery stents are noted. There has been placement of an additional left renal artery stent slightly distal to the existing stent. The stents are not overlapping, and there may be a residual high-grade stenosis between the stents (see   series 900, images 87 through 96. Patency of the stents and the left renal artery branches is not well assessed on this study. There is persistent heterogeneous enhancement of the left kidney which may indicate hypoperfusion or multiple small infarcts.   This finding is unchanged. If clinically indicated, conventional angiography may better assess the left renal artery for patency.     Vascular calcifications are seen. 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 Poorly visualized right renal artery may be severely stenotic or occluded.     Bones and soft tissues: No acute osseous lesion is identified. Bones are demineralized. There are focal degenerative changes at L5-S1. Left hip arthroplasty is partially visualized. The superficial soft tissues demonstrate no acute abnormality.      Impression:      1. Large left perinephric and retroperitoneal hematomas. Dominant retroperitoneal hematoma now measures approximately 13 x 11 x 9 cm which is slightly larger when compared with 6/5/2024. Left perinephric hematoma appears smaller since the prior study,   now measuring approximately 2.5 cm in thickness, previously measuring up to 4.3 cm in thickness. Small air lucency within the perinephric hematoma may indicate interval percutaneous aspiration. Embolization coils are now seen along the periphery of the   left kidney. No active contrast extravasation is identified.    2.Bilateral renal artery stents are noted. There has been placement of an additional left renal artery stent slightly  distal to the existing stent. The stents are not overlapping, and there may be a residual high-grade stenosis between the stents (see   series 900, images 87 through 96. Patency of the stents and the left renal artery branches is not well assessed on this study. There is persistent heterogeneous enhancement of the left kidney which may indicate hypoperfusion or multiple small infarcts.   This finding is unchanged. If clinically indicated, conventional angiography may better assess the left renal artery for patency.  3.Bilateral lower lobe atelectasis or pneumonia with small bilateral pleural effusions. Secretions/debris noted within the bilateral lower lobe bronchi. Patient may benefit from bronchoscopy.  4.Nonspecific 8 mm nodular groundglass focus within the left lung apex  5.Additional findings as detailed above.    The above findings were discussed with  Anca Tompkins   via telephone on 6/13/2024 12:11 PM EDT.      Electronically Signed: Leander Hartman MD    6/13/2024 12:15 PM EDT    Workstation ID: [id number]    CT Chest Without Contrast Diagnostic [[id number]] Collected: 06/13/24 1137     Updated: 06/13/24 1218    Narrative:      CT ABDOMEN PELVIS W WO CONTRAST, CT CHEST WO CONTRAST DIAGNOSTIC    Date of Exam: 6/13/2024 11:01 AM EDT    Indication: shock.    Comparison: 6/5/2024    Technique:   Axial CT images were obtained of the chest without contrast administration.  Reconstructed coronal and sagittal images were also obtained. Automated exposure control and iterative construction methods were used.    Axial CT images were obtained of the abdomen and pelvis before and after the uneventful intravenous administration of 100 mL Isovue 300. Sagittal and coronal reconstructions were performed.  Automated exposure control and iterative reconstruction methods   were used.    FINDINGS:      Thoracic inlet: Unremarkable.    Great vessels: Atherosclerotic calcifications are seen within the thoracic aorta and proximal  arch vessels. Bilateral internal jugular venous catheters terminate within the SVC.    Mediastinum/Rae: No pathologically enlarged mediastinal lymph nodes are seen. Enteric tubes are seen within the esophagus. The esophagus is otherwise unremarkable.    Lung parenchyma: Small bilateral pleural effusions with bilateral lower lobe atelectasis or pneumonia. Nonspecific 8 mm nodular groundglass focus within the left lung apex. No pneumothorax is seen.    Trachea and airways: Endotracheal tube terminates within the upper trachea. There appears to be secretions/mucous plugging within the bilateral lower lobe bronchi.    Heart and pericardium: Coronary artery calcifications. Otherwise, the heart and pericardium appear unremarkable    Liver: The liver is unremarkable in morphology. No focal liver lesion is seen. There is mild biliary dilation which may be related to prior cholecystectomy.    Gallbladder: Surgically absent.    Pancreas: Grossly unremarkable.    Spleen: Unremarkable.    Adrenal glands: Left adrenal gland is not well visualized due to adjacent hematoma. Right adrenal gland is grossly unremarkable.    Genitourinary tract: Large left perinephric and retroperitoneal hematomas. Dominant retroperitoneal hematoma now measures approximately 13 x 11 x 9 cm which is slightly larger when compared with 6/5/2024. Left perinephric hematoma appears smaller since   the prior study, now measuring approximately 2.5 cm in thickness, previously measuring up to 4.3 cm in thickness. Small air lucency within the perinephric hematoma may indicate interval percutaneous aspiration. Embolization coils are now seen along the   periphery of the left kidney. No active contrast extravasation is identified. Focal left renal hypodensity may represent a small infarct. Right kidney is atrophic with small cysts and subcentimeter hypodensities too small to characterize. No   hydronephrosis is seen. Left ureter is not well visualized. Visualized  right ureter is unremarkable. Urinary bladder is not well visualized and may be empty.    Gastrointestinal tract: The visualized hollow viscera demonstrate no focal lesion. There is no evidence of bowel obstruction. There are 2 enteric tubes which terminate within the the distal stomach.    Appendix: The appendix is not identified.    Other findings: No free air is identified. Patient has undergone aortobiiliac stent graft repair of an abdominal aortic aneurysm. Lobulated soft tissue density about the infrarenal aorta may represent small thrombosed/excluded saccular aneurysm sacs   (series 5, images 58 through 61). This appears similar since the prior study.     Bilateral renal artery stents are noted. There has been placement of an additional left renal artery stent slightly distal to the existing stent. The stents are not overlapping, and there may be a residual high-grade stenosis between the stents (see   series 900, images 87 through 96. Patency of the stents and the left renal artery branches is not well assessed on this study. There is persistent heterogeneous enhancement of the left kidney which may indicate hypoperfusion or multiple small infarcts.   This finding is unchanged. If clinically indicated, conventional angiography may better assess the left renal artery for patency.     Vascular calcifications are seen. 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 Poorly visualized right renal artery may be severely stenotic or occluded.     Bones and soft tissues: No acute osseous lesion is identified. Bones are demineralized. There are focal degenerative changes at L5-S1. Left hip arthroplasty is partially visualized. The superficial soft tissues demonstrate no acute abnormality.      Impression:      1. Large left perinephric and retroperitoneal hematomas. Dominant retroperitoneal hematoma now measures approximately 13 x 11 x 9 cm which is slightly larger when compared with  6/5/2024. Left perinephric hematoma appears smaller since the prior study,   now measuring approximately 2.5 cm in thickness, previously measuring up to 4.3 cm in thickness. Small air lucency within the perinephric hematoma may indicate interval percutaneous aspiration. Embolization coils are now seen along the periphery of the   left kidney. No active contrast extravasation is identified.    2.Bilateral renal artery stents are noted. There has been placement of an additional left renal artery stent slightly distal to the existing stent. The stents are not overlapping, and there may be a residual high-grade stenosis between the stents (see   series 900, images 87 through 96. Patency of the stents and the left renal artery branches is not well assessed on this study. There is persistent heterogeneous enhancement of the left kidney which may indicate hypoperfusion or multiple small infarcts.   This finding is unchanged. If clinically indicated, conventional angiography may better assess the left renal artery for patency.  3.Bilateral lower lobe atelectasis or pneumonia with small bilateral pleural effusions. Secretions/debris noted within the bilateral lower lobe bronchi. Patient may benefit from bronchoscopy.  4.Nonspecific 8 mm nodular groundglass focus within the left lung apex  5.Additional findings as detailed above.    The above findings were discussed with  Anca Tompkins   via telephone on 6/13/2024 12:11 PM EDT.      Electronically Signed: Leander Hartman MD    6/13/2024 12:15 PM EDT    Workstation ID: [id number]    XR Chest 1 View [[id number]] Collected: 06/13/24 0617     Updated: 06/13/24 0621    Narrative:      XR CHEST 1 VW    Date of Exam: 6/13/2024 3:02 AM EDT    Indication: f/u respiratory illness.    Comparison: 1 day prior.    Findings:  Support hardware projects unchanged. Small left pleural effusion and bibasilar atelectasis appears similar. There is no distinct pneumothorax. Unchanged heart and  mediastinal contours.      Impression:      Impression:  No significant interval change.      Electronically Signed: Steven Ko MD    6/13/2024 6:17 AM EDT    Workstation ID: [id number]    XR Chest 1 View [[id number]] Collected: 06/12/24 0605     Updated: 06/12/24 0610    Narrative:      XR CHEST 1 VW    Date of Exam: 6/12/2024 2:11 AM EDT    Indication: f/u respiratory illness.    Comparison: 1 day prior.    Findings:  Support hardware projects unchanged. There are small bilateral pleural effusions with adjacent lower lobe volume loss. There is no distinct pneumothorax or new focal airspace consolidation otherwise. Unchanged heart and mediastinal contours.      Impression:      Impression:  Mildly increased bilateral pleural effusions.      Electronically Signed: Steven Ko MD    6/12/2024 6:06 AM EDT    Workstation ID: [id number]            Diagnostics: Reviewed    A: 74-year-old female with multiorgan failure, vent dependent, and overall poor prognosis.  Have reviewed overall prognosis, palliative measures, and hospice services for the 2 daughters but they are not ready to change her CODE STATUS or commit to any changes.  One of the daughters states that her mother would not want all of these measures while the other daughter is still struggling.         P: We will continue to monitor and support the family and assist with any medical decision making.    We appreciate the consult and the opportunity to participate in Dalila Velez's care. We will continue to follow along. Please do not hesitate to contact us regarding further symptom management or goals of care needs, including after hours or on weekends via our on call provider at [phone].     Time: 40 minutes spent reviewing medical and medication records, assessing and examining patient, discussing with family, answering questions, providing some guidance about a plan and documentation of care, and coordinating care with other healthcare members,  with > 50% time spent face to face.     Braden Terrazas MD    6/14/2024

## 2024-06-15 NOTE — NURSING NOTE
CRRT rounds completed. Treatment plan  reviewed with DEBBIE March. Orders and documentation reviewed. Prescription for CRRT machine reviewed with nurse- any needed adjustments made at this time.  Dressing to dialysis access visualized and intact. Dressing change due ___________.  Primary nurse encouraged to call [phone]  or on call dialysis nurse for assistance or any questions.  Pt is transitioning to Hospice.

## 2024-06-15 NOTE — PLAN OF CARE
Goal Outcome Evaluation:        Transitioned to comfort measures only. CRRT stopped. Palliative extubation @ 1640.

## 2024-06-15 NOTE — PROGRESS NOTES
[facility]  Vascular Surgery Progress Note    Patient Name: Dalila Velez  : 1950  MRN: [id number]  Date of admission: 2024  Surgical Procedures Since Admission:  Procedure(s):  ENDOVASCULAR REPAIR OF AORTIC ANEURYSM with fenestration of the left renal artery, Large-bore bilateral femoral sheath placement for endovascular graft deployment.  Right 22 Bengali and left 12 Bengali, Bifurcated endovascular repair of aortic aneurysm extending into the right external iliac artery and left common iliac artery  Surgeon:  Theodore Reyes MD  Status:  10 Days Post-Op  -------------------    Procedure(s):  renal distal inferior artery embolization for hemorrhage left, Graftmaster covered stent placement for distal renal artery thrombus versus dissection  Surgeon:  Theodore Reyes MD  Status:  10 Days Post-Op  -------------------    Subjective   Subjective     Subjective: No acute events overnight, remains intubated and sedated on CRRT      Objective   Objective     Vitals:   Temp:  [96.8 °F (36 °C)-98.3 °F (36.8 °C)] 98.3 °F (36.8 °C)  Heart Rate:  [] 87  Resp:  [16-20] 20  BP: ()/(38-59) 111/57  FiO2 (%):  [75 %-95 %] 85 %  Output by Drain (mL) 24 0701 - 24 1900 24 1901 - 06/15/24 0700 06/15/24 0701 - 06/15/24 1131 Range Total   NG/OG Tube Nasogastric 18 Fr Right nostril 250 100  350       Physical Exam    Intubated and sedated.  Jaundice noted.  On dialysis via CRRT.  Abdomen is distended  Feet are warm and well-perfused.    Result Review    Result Review:  I have personally reviewed the results from the time of this admission to 6/15/2024 11:31 EDT and agree with these findings:  [x]  Laboratory  []  Microbiology  [x]  Radiology  []  EKG/Telemetry   []  Cardiology/Vascular   []  Pathology  []  Old records  []  Other:      Assessment & Plan   Assessment / Plan     Brief Patient Summary:  Dalila Velez is a 74 y.o. female who underwent complex endovascular  aortic aneurysm repair.  Patient had left renal artery hemorrhage.  Patient in multisystem organ failure.    Plan:   Family transitioning to comfort measures with plan of cessation of CRRT and extubation.    Franklyn Gil MD

## 2024-06-15 NOTE — PROGRESS NOTES
"Palliative Care Daily Progress Note     C/C: Patient unresponsive, currently on MV.     S: Medical record reviewed. Follow up visit for GOC in the context of complex medical decision making. Events noted. Called to bedside at 12:45pm to meet with daughters Irma and Kathy as well as further family. They elect comfort focused plan of care, they would like to wait for extubation until after further family arrive at 1430. They are in agreement with stopping CRRT at this time.     ROS: ROS limited by condition/MV.     O: Code Status:   Code Status and Medical Interventions:   Ordered at: 06/14/24 1652     Medical Intervention Limits:    No cardioversion     Code Status (Patient has no pulse and is not breathing):    No CPR (Do Not Attempt to Resuscitate)     Medical Interventions (Patient has pulse or is breathing):    Limited Support      Advanced Directives: Advance Directive Status: Patient does not have advance directive   Goals of Care: Ongoing.   Palliative Performance Scale Score: 10%     /53 (BP Location: Left arm, Patient Position: Lying)   Pulse 90   Temp 98.3 °F (36.8 °C) (Axillary)   Resp 20   Ht 168.9 cm (66.5\")   Wt 76.2 kg (167 lb 15.9 oz)   SpO2 95%   BMI 26.71 kg/m²     Intake/Output Summary (Last 24 hours) at 6/15/2024 1249  Last data filed at 6/15/2024 1200  Gross per 24 hour   Intake 2556.95 ml   Output 6591 ml   Net -4034.05 ml       PE:  General Appearance:    Patient laying in bed, unresponsive to verbal stimuli, A/C ill appearing,    HEENT:    NC/AT, MMM, face relaxed, ETT in place   Neck:   supple, trachea midline, no JVD   Lungs:     rhonchi bilaterally, diminished in bases; on MV FiO2 85%, PEEP 14, RR 20 on exam    Heart:    RRR, normal S1 and S2, no M/R/G, HR 90   Abdomen:     Normal bowel sounds, soft, nontender, nondistended   G/U:   Deferred   MSK/Extremities:   +2 edema   Pulses:   Pulses palpable and equal bilaterally   Skin:   Warm, dry   Neurologic:   On MV   Psych:  On " MV     Meds: Reviewed and changes noted    Labs:   Results from last 7 days   Lab Units 06/15/24  1145   WBC 10*3/mm3 26.69*   HEMOGLOBIN g/dL 7.9*   HEMATOCRIT % 25.6*   PLATELETS 10*3/mm3 210     Results from last 7 days   Lab Units 06/15/24  1145   SODIUM mmol/L 133*   POTASSIUM mmol/L 4.7   CHLORIDE mmol/L 98   CO2 mmol/L 20.0*   BUN mg/dL 26*   CREATININE mg/dL 1.51*   GLUCOSE mg/dL 116*   CALCIUM mg/dL 9.0     Results from last 7 days   Lab Units 06/15/24  1145 06/14/24  0806 06/14/24  0502   SODIUM mmol/L 133*   < > 133*   POTASSIUM mmol/L 4.7   < > 4.4   CHLORIDE mmol/L 98   < > 100   CO2 mmol/L 20.0*   < > 21.0*   BUN mg/dL 26*   < > 23   CREATININE mg/dL 1.51*   < > 1.52*   CALCIUM mg/dL 9.0   < > 8.2*   BILIRUBIN mg/dL  --   --  1.1   ALK PHOS U/L  --   --  137*   ALT (SGPT) U/L  --   --  <5   AST (SGOT) U/L  --   --  76*   GLUCOSE mg/dL 116*   < > 94    < > = values in this interval not displayed.     Imaging Results (Last 72 Hours)       Procedure Component Value Units Date/Time    XR Chest 1 View [[id number]] Collected: 06/15/24 0520     Updated: 06/15/24 0525    Narrative:      XR CHEST 1 VW    Date of Exam: 6/15/2024 1:30 AM EDT    Indication: f/u respiratory illness.    Comparison: 1 day prior.    Findings:  Support hardware projects unchanged. Small right pleural effusion appears similar. No distinct pneumothorax or new focal consolidation. Unchanged heart and mediastinal contours.      Impression:      Impression:  No significant interval change.      Electronically Signed: Steven Ko MD    6/15/2024 5:22 AM EDT    Workstation ID: [id number]    XR Chest 1 View [[id number]] Collected: 06/14/24 0624     Updated: 06/14/24 0628    Narrative:      XR CHEST 1 VW    Date of Exam: 6/14/2024 3:48 AM EDT    Indication: f/u respiratory illness.    Comparison: 1 day prior.    Findings:  Support hardware projects unchanged. Small bilateral layering pleural effusions are present with adjacent atelectasis. There  is no distinct pneumothorax. Unchanged heart and mediastinal contours.      Impression:      Impression:  No significant interval change.      Electronically Signed: Steven Ko MD    6/14/2024 6:24 AM EDT    Workstation ID: [id number]    CT Abdomen Pelvis With & Without Contrast [[id number]] Collected: 06/13/24 1137     Updated: 06/13/24 1218    Narrative:      CT ABDOMEN PELVIS W WO CONTRAST, CT CHEST WO CONTRAST DIAGNOSTIC    Date of Exam: 6/13/2024 11:01 AM EDT    Indication: shock.    Comparison: 6/5/2024    Technique:   Axial CT images were obtained of the chest without contrast administration.  Reconstructed coronal and sagittal images were also obtained. Automated exposure control and iterative construction methods were used.    Axial CT images were obtained of the abdomen and pelvis before and after the uneventful intravenous administration of 100 mL Isovue 300. Sagittal and coronal reconstructions were performed.  Automated exposure control and iterative reconstruction methods   were used.    FINDINGS:      Thoracic inlet: Unremarkable.    Great vessels: Atherosclerotic calcifications are seen within the thoracic aorta and proximal arch vessels. Bilateral internal jugular venous catheters terminate within the SVC.    Mediastinum/Rae: No pathologically enlarged mediastinal lymph nodes are seen. Enteric tubes are seen within the esophagus. The esophagus is otherwise unremarkable.    Lung parenchyma: Small bilateral pleural effusions with bilateral lower lobe atelectasis or pneumonia. Nonspecific 8 mm nodular groundglass focus within the left lung apex. No pneumothorax is seen.    Trachea and airways: Endotracheal tube terminates within the upper trachea. There appears to be secretions/mucous plugging within the bilateral lower lobe bronchi.    Heart and pericardium: Coronary artery calcifications. Otherwise, the heart and pericardium appear unremarkable    Liver: The liver is unremarkable in morphology. No  focal liver lesion is seen. There is mild biliary dilation which may be related to prior cholecystectomy.    Gallbladder: Surgically absent.    Pancreas: Grossly unremarkable.    Spleen: Unremarkable.    Adrenal glands: Left adrenal gland is not well visualized due to adjacent hematoma. Right adrenal gland is grossly unremarkable.    Genitourinary tract: Large left perinephric and retroperitoneal hematomas. Dominant retroperitoneal hematoma now measures approximately 13 x 11 x 9 cm which is slightly larger when compared with 6/5/2024. Left perinephric hematoma appears smaller since   the prior study, now measuring approximately 2.5 cm in thickness, previously measuring up to 4.3 cm in thickness. Small air lucency within the perinephric hematoma may indicate interval percutaneous aspiration. Embolization coils are now seen along the   periphery of the left kidney. No active contrast extravasation is identified. Focal left renal hypodensity may represent a small infarct. Right kidney is atrophic with small cysts and subcentimeter hypodensities too small to characterize. No   hydronephrosis is seen. Left ureter is not well visualized. Visualized right ureter is unremarkable. Urinary bladder is not well visualized and may be empty.    Gastrointestinal tract: The visualized hollow viscera demonstrate no focal lesion. There is no evidence of bowel obstruction. There are 2 enteric tubes which terminate within the the distal stomach.    Appendix: The appendix is not identified.    Other findings: No free air is identified. Patient has undergone aortobiiliac stent graft repair of an abdominal aortic aneurysm. Lobulated soft tissue density about the infrarenal aorta may represent small thrombosed/excluded saccular aneurysm sacs   (series 5, images 58 through 61). This appears similar since the prior study.     Bilateral renal artery stents are noted. There has been placement of an additional left renal artery stent slightly  distal to the existing stent. The stents are not overlapping, and there may be a residual high-grade stenosis between the stents (see   series 900, images 87 through 96. Patency of the stents and the left renal artery branches is not well assessed on this study. There is persistent heterogeneous enhancement of the left kidney which may indicate hypoperfusion or multiple small infarcts.   This finding is unchanged. If clinically indicated, conventional angiography may better assess the left renal artery for patency.     Vascular calcifications are seen. 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 Poorly visualized right renal artery may be severely stenotic or occluded.     Bones and soft tissues: No acute osseous lesion is identified. Bones are demineralized. There are focal degenerative changes at L5-S1. Left hip arthroplasty is partially visualized. The superficial soft tissues demonstrate no acute abnormality.      Impression:      1. Large left perinephric and retroperitoneal hematomas. Dominant retroperitoneal hematoma now measures approximately 13 x 11 x 9 cm which is slightly larger when compared with 6/5/2024. Left perinephric hematoma appears smaller since the prior study,   now measuring approximately 2.5 cm in thickness, previously measuring up to 4.3 cm in thickness. Small air lucency within the perinephric hematoma may indicate interval percutaneous aspiration. Embolization coils are now seen along the periphery of the   left kidney. No active contrast extravasation is identified.    2.Bilateral renal artery stents are noted. There has been placement of an additional left renal artery stent slightly distal to the existing stent. The stents are not overlapping, and there may be a residual high-grade stenosis between the stents (see   series 900, images 87 through 96. Patency of the stents and the left renal artery branches is not well assessed on this study. There is  persistent heterogeneous enhancement of the left kidney which may indicate hypoperfusion or multiple small infarcts.   This finding is unchanged. If clinically indicated, conventional angiography may better assess the left renal artery for patency.  3.Bilateral lower lobe atelectasis or pneumonia with small bilateral pleural effusions. Secretions/debris noted within the bilateral lower lobe bronchi. Patient may benefit from bronchoscopy.  4.Nonspecific 8 mm nodular groundglass focus within the left lung apex  5.Additional findings as detailed above.    The above findings were discussed with  Anca Tompkins   via telephone on 6/13/2024 12:11 PM EDT.      Electronically Signed: Leander Hartman MD    6/13/2024 12:15 PM EDT    Workstation ID: [id number]    CT Chest Without Contrast Diagnostic [[id number]] Collected: 06/13/24 1137     Updated: 06/13/24 1218    Narrative:      CT ABDOMEN PELVIS W WO CONTRAST, CT CHEST WO CONTRAST DIAGNOSTIC    Date of Exam: 6/13/2024 11:01 AM EDT    Indication: shock.    Comparison: 6/5/2024    Technique:   Axial CT images were obtained of the chest without contrast administration.  Reconstructed coronal and sagittal images were also obtained. Automated exposure control and iterative construction methods were used.    Axial CT images were obtained of the abdomen and pelvis before and after the uneventful intravenous administration of 100 mL Isovue 300. Sagittal and coronal reconstructions were performed.  Automated exposure control and iterative reconstruction methods   were used.    FINDINGS:      Thoracic inlet: Unremarkable.    Great vessels: Atherosclerotic calcifications are seen within the thoracic aorta and proximal arch vessels. Bilateral internal jugular venous catheters terminate within the SVC.    Mediastinum/Rae: No pathologically enlarged mediastinal lymph nodes are seen. Enteric tubes are seen within the esophagus. The esophagus is otherwise unremarkable.    Lung parenchyma:  Small bilateral pleural effusions with bilateral lower lobe atelectasis or pneumonia. Nonspecific 8 mm nodular groundglass focus within the left lung apex. No pneumothorax is seen.    Trachea and airways: Endotracheal tube terminates within the upper trachea. There appears to be secretions/mucous plugging within the bilateral lower lobe bronchi.    Heart and pericardium: Coronary artery calcifications. Otherwise, the heart and pericardium appear unremarkable    Liver: The liver is unremarkable in morphology. No focal liver lesion is seen. There is mild biliary dilation which may be related to prior cholecystectomy.    Gallbladder: Surgically absent.    Pancreas: Grossly unremarkable.    Spleen: Unremarkable.    Adrenal glands: Left adrenal gland is not well visualized due to adjacent hematoma. Right adrenal gland is grossly unremarkable.    Genitourinary tract: Large left perinephric and retroperitoneal hematomas. Dominant retroperitoneal hematoma now measures approximately 13 x 11 x 9 cm which is slightly larger when compared with 6/5/2024. Left perinephric hematoma appears smaller since   the prior study, now measuring approximately 2.5 cm in thickness, previously measuring up to 4.3 cm in thickness. Small air lucency within the perinephric hematoma may indicate interval percutaneous aspiration. Embolization coils are now seen along the   periphery of the left kidney. No active contrast extravasation is identified. Focal left renal hypodensity may represent a small infarct. Right kidney is atrophic with small cysts and subcentimeter hypodensities too small to characterize. No   hydronephrosis is seen. Left ureter is not well visualized. Visualized right ureter is unremarkable. Urinary bladder is not well visualized and may be empty.    Gastrointestinal tract: The visualized hollow viscera demonstrate no focal lesion. There is no evidence of bowel obstruction. There are 2 enteric tubes which terminate within the  the distal stomach.    Appendix: The appendix is not identified.    Other findings: No free air is identified. Patient has undergone aortobiiliac stent graft repair of an abdominal aortic aneurysm. Lobulated soft tissue density about the infrarenal aorta may represent small thrombosed/excluded saccular aneurysm sacs   (series 5, images 58 through 61). This appears similar since the prior study.     Bilateral renal artery stents are noted. There has been placement of an additional left renal artery stent slightly distal to the existing stent. The stents are not overlapping, and there may be a residual high-grade stenosis between the stents (see   series 900, images 87 through 96. Patency of the stents and the left renal artery branches is not well assessed on this study. There is persistent heterogeneous enhancement of the left kidney which may indicate hypoperfusion or multiple small infarcts.   This finding is unchanged. If clinically indicated, conventional angiography may better assess the left renal artery for patency.     Vascular calcifications are seen. 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 Poorly visualized right renal artery may be severely stenotic or occluded.     Bones and soft tissues: No acute osseous lesion is identified. Bones are demineralized. There are focal degenerative changes at L5-S1. Left hip arthroplasty is partially visualized. The superficial soft tissues demonstrate no acute abnormality.      Impression:      1. Large left perinephric and retroperitoneal hematomas. Dominant retroperitoneal hematoma now measures approximately 13 x 11 x 9 cm which is slightly larger when compared with 6/5/2024. Left perinephric hematoma appears smaller since the prior study,   now measuring approximately 2.5 cm in thickness, previously measuring up to 4.3 cm in thickness. Small air lucency within the perinephric hematoma may indicate interval percutaneous  aspiration. Embolization coils are now seen along the periphery of the   left kidney. No active contrast extravasation is identified.    2.Bilateral renal artery stents are noted. There has been placement of an additional left renal artery stent slightly distal to the existing stent. The stents are not overlapping, and there may be a residual high-grade stenosis between the stents (see   series 900, images 87 through 96. Patency of the stents and the left renal artery branches is not well assessed on this study. There is persistent heterogeneous enhancement of the left kidney which may indicate hypoperfusion or multiple small infarcts.   This finding is unchanged. If clinically indicated, conventional angiography may better assess the left renal artery for patency.  3.Bilateral lower lobe atelectasis or pneumonia with small bilateral pleural effusions. Secretions/debris noted within the bilateral lower lobe bronchi. Patient may benefit from bronchoscopy.  4.Nonspecific 8 mm nodular groundglass focus within the left lung apex  5.Additional findings as detailed above.    The above findings were discussed with  Anca Tompkins   via telephone on 6/13/2024 12:11 PM EDT.      Electronically Signed: Leander Hartman MD    6/13/2024 12:15 PM EDT    Workstation ID: [id number]    XR Chest 1 View [[id number]] Collected: 06/13/24 0617     Updated: 06/13/24 0621    Narrative:      XR CHEST 1 VW    Date of Exam: 6/13/2024 3:02 AM EDT    Indication: f/u respiratory illness.    Comparison: 1 day prior.    Findings:  Support hardware projects unchanged. Small left pleural effusion and bibasilar atelectasis appears similar. There is no distinct pneumothorax. Unchanged heart and mediastinal contours.      Impression:      Impression:  No significant interval change.      Electronically Signed: Steven Ko MD    6/13/2024 6:17 AM EDT    Workstation ID: [id number]                  Diagnostics: Reviewed    A:   Juxtarenal abdominal aortic  aneurysm (AAA) without rupture    Renal artery stenosis    Hypertension    Hypothyroidism    Acute blood loss anemia    Acute kidney injury    Acute respiratory failure with hypoxia     74 y.o. female with multiorgan failure, requiring MV after repair of aortic aneurysm.     S/Sx:  Dyspnea -currently on MV  -daughters Dewey elect comfort measures  -plan for palliative extubation and stop pressors with arrival of further family  -plan to decrease Fentanyl infusion to 100mcg/hr with 50mcg q 30 minutes prn pain/dyspnea prior to palliative extubation  -plan to stop Versed infusion prior to palliative extubation  -plan for one time dose Ativan and Robinul prior to extubation    2. Pain -presumed  -Fentanyl infusion and prn bolus    3. Anxiety/agitation -discontinue Versed infusion prior to palliative extubation  -pre-medicate for anxiety with Ativan prior to palliative extubation and then prn dosing after    4. GOC -DNR/DNI/Comfort measures -per election of daughters Dewey  -met with daughters at bedside, they are electing comfort measures with discontinuation of CRRT, labs, IV fluids, IV antibiotics, vasopressors, MV with goal for symptom management throughout end of life  -daughters in agreement with stopping CRRT at this time  -plan for palliative extubation and stop pressors with arrival of further family    P: Met with daughters at bedside. They are electing comfort measures with discontinuation of CRRT, labs, IV fluids, IV antibiotics, vasopressors, MV with goal for symptom management throughout end of life. Daughters in agreement with stopping CRRT at this time. Plan for palliative extubation and stop pressors with arrival of further family at 1430. Will continue to adjust orders and provide symptom management with comfort focused plan of care throughout end of life.   Palliative Care Team will continue to follow patient. Please do not hesitate to contact us regarding further sx mgmt or GOC  needs.  Ethel Islas, APRN  6/15/2024  Time spent:35 minutes

## 2024-06-15 NOTE — PROGRESS NOTES
I visited this patient per provider referral. I offered spiritual support including the Mu-ism of the patient per family request. I will continue to follow.

## 2024-06-15 NOTE — PLAN OF CARE
Goal Outcome Evaluation:  Plan of Care Reviewed With: patient, daughter        Progress: no change  Outcome Evaluation: Pt continues with MV 85% and CRRT. Tolerating UF of 200. Requiring norepi for bp support. Family updated at bedside at the beginning of the shift.

## 2024-06-15 NOTE — PROGRESS NOTES
Family requesting palliative extubation at this time as all of family have arrived. Reviewed orders and medications adjusted to facilitate palliative extubation and symptom management through out end of life.

## 2024-06-15 NOTE — PROGRESS NOTES
" LOS: 10 days    Patient Care Team:  Frannie Couch MD as PCP - General    Reason For Visit: Acute renal failure.     Subjective     Intubated sedated on 1 pressor. Remains critically ill. On CRRT UF ~ 150cc/hr. Requiring higher vent support on PEEP 14. Labs significant for high normal K. Prescription adjusted.     Objective     budesonide, 0.5 mg, Nebulization, BID - RT  castor oil-balsam peru, 1 Application, Topical, Q12H  chlorhexidine, 15 mL, Mouth/Throat, Q12H  famotidine, 20 mg, Intravenous, Daily  heparin (porcine), 5,000 Units, Subcutaneous, Q8H  insulin regular, 2-9 Units, Subcutaneous, Q6H  ipratropium-albuterol, 3 mL, Nebulization, Q6H - RT  levothyroxine, 100 mcg, Nasogastric, Q AM  metoclopramide, 5 mg, Intravenous, Q8H  piperacillin-tazobactam, 4.5 g, Intravenous, Q8H  polyethylene glycol, 17 g, Nasogastric, Daily  sertraline, 200 mg, Nasogastric, Daily  sodium chloride, 10 mL, Intravenous, Q12H      dextrose, 50 mL/hr, Last Rate: 50 mL/hr (06/14/24 1654)  fentanyl 10 mcg/mL,  mcg/hr, Last Rate: 300 mcg/hr (06/15/24 1027)  midazolam, 1-10 mg/hr, Last Rate: 5 mg/hr (06/14/24 2004)  norepinephrine, 0.02-0.3 mcg/kg/min, Last Rate: 0.04 mcg/kg/min (06/15/24 1012)  Phoxillum BK4/2.5, 3,000 mL/hr, Last Rate: 3,000 mL/hr (06/15/24 1033)      Vital Signs:  Blood pressure 111/57, pulse 87, temperature 98.3 °F (36.8 °C), temperature source Axillary, resp. rate 20, height 168.9 cm (66.5\"), weight 76.2 kg (167 lb 15.9 oz), SpO2 96%.    Flowsheet Rows      Flowsheet Row First Filed Value   Admission Height 168.9 cm (66.5\") Documented at 06/05/2024 0740   Admission Weight 65.4 kg (144 lb 1.1 oz) Documented at 06/05/2024 0740            06/14 0701 - 06/15 0700  In: 2616.9 [I.V.:2231.9]  Out: 6488     Physical Exam:  GENERAL: Intubated and sedated.   HEENT: Normocephalic, atraumatic.    NECK: Supple   HEART: RRR; No murmur, rubs, gallops. Trace edema  LUNGS: Mechanical breath sounds.   ABDOMEN: Soft, " nontender, nondistended..  EXT:  ++  edema.  : no Rios   SKIN: Warm and dry without rash  NEURO: Unable to assess  PSYCHIATRIC: Unable to assess    Labs:  Results from last 7 days   Lab Units 06/15/24  0446 06/14/24  2346 06/14/24  1649   WBC 10*3/mm3 20.96* 24.01* 21.77*   HEMOGLOBIN g/dL 7.9* 7.8* 7.5*   HEMATOCRIT % 25.0* 25.4* 24.2*   PLATELETS 10*3/mm3 172 157 149     Results from last 7 days   Lab Units 06/15/24  0745 06/15/24  0446 06/14/24  2346 06/14/24  1649 06/14/24  1147 06/14/24  0806   SODIUM mmol/L 130* 133* 134* 131*  134*   < > 134*   POTASSIUM mmol/L 4.6 5.0 4.9 4.6  4.7   < > 4.2   CHLORIDE mmol/L 94* 100 99 96*  100   < > 100   CO2 mmol/L 18.0* 19.0* 20.0* 19.0*  19.0*   < > 22.0   BUN mg/dL 27* 26* 26* 22  23   < > 23   CREATININE mg/dL 1.63* 1.65* 1.58* 1.63*  1.58*   < > 1.62*   CALCIUM mg/dL 8.7 8.5* 8.8 8.3*  8.3*   < > 8.3*   PHOSPHORUS mg/dL 4.8*  --  5.4* 5.4*  --  4.5   MAGNESIUM mg/dL 2.5*  --  2.5* 2.4  --  2.4   ALBUMIN g/dL 3.5  --  3.5 3.3*  --  3.5    < > = values in this interval not displayed.     Results from last 7 days   Lab Units 06/15/24  0745   GLUCOSE mg/dL 119*       Results from last 7 days   Lab Units 06/14/24  0502   ALK PHOS U/L 137*   BILIRUBIN mg/dL 1.1   ALT (SGPT) U/L <5   AST (SGOT) U/L 76*     Results from last 7 days   Lab Units 06/15/24  0333   PH, ARTERIAL pH units 7.212*   PO2 ART mm Hg 74.8*   PCO2, ARTERIAL mm Hg 51.0*   HCO3 ART mmol/L 20.5         Estimated Creatinine Clearance: 32.4 mL/min (A) (by C-G formula based on SCr of 1.63 mg/dL (H)).    Chest  Xray:   IMPRESSION:  Impression:  Worsening small right-sided pleural effusion with probable overlying atelectasis. Mild retraction of the endotracheal tube with the tip in the proximal to mid trachea     CT abdomen:   Impression:  1. Left renal artery stent with high-grade stenosis just distal to the stent, possibly from a dissection flap.  2. Large active extravasation from the mid pole left  renal cortex with a large pararenal and retroperitoneal hematoma.   3. Left lower lobe atelectasis, which was noted at time of dictation. These findings were being communicated to the OR.  4. Abdominal aortic aneurysm status post endograft repair.    Assessment     Acute renal failure  Abdominal aortic aneurysm repair 6/5  Left renal hemorrhage s/p distal inferior branch embolization  Right atrophic kidney  Acute blood loss anemia  Hypertension   Dyslipidemia  Coronary artery disease    PLAN:     Acute renal failure on CKD stage III:   - Baseline renal function 1.4-1.5.  Patient follows up with Dr. Brown.  - Right atrophic kidney.  Left renal hemorrhage s/p distal inferior branch embolization 6/5.   - Urine output minimal.  CRRT initiated on 6/8.  -Continue CRRT for now. Family likely transitioning to comfort measures later today.     Metabolic acidosis:   - Managed with dialysis.     Acute blood loss anemia:   -S/p 8 units of PRBC   -Transfuse as needed to keep hemoglobin above 7.      Abdominal aortic aneurysm repair 6/5:   -Vascular following.      Recs  Continue CRRT until patient transitioned to comfort measures. Increase DFR @ 3000 ml/hr    High risk and critically ill patient    Patrick Ratliff MD  06/15/24  11:21 EDT

## 2024-06-15 NOTE — SIGNIFICANT NOTE
Exam confirms with auscultation zero audible heart tones and zero audible respirations. Ms.Wanda MICHAEL Velez was pronounced dead at 1838.  MD notified by Patient's RN.    Arjun Shaikh RN  Clinical House Supervisor  6/15/2024 18:59 EDT

## 2024-06-17 LAB
BACTERIA SPEC AEROBE CULT: NORMAL
BACTERIA SPEC AEROBE CULT: NORMAL

## 2024-06-19 NOTE — DISCHARGE SUMMARY
Date of Discharge:  6/19/2024    Discharge Diagnosis:   Multiorgan system failure  Left renal artery hemorrhage  Symptomatic juxtarenal abdominal aortic aneurysm  End-stage renal disease    Presenting Problem/History of Present Illness  Active Hospital Problems    Diagnosis  POA    **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 [I71.42]  Yes    Acute respiratory failure with hypoxia [J96.01]  Unknown    Acute kidney injury [N17.9]  Unknown    Acute blood loss anemia [D62]  Unknown    Renal artery stenosis [I70.1]  Yes    Hypothyroidism [E03.9]  Yes    Hypertension [I10]  Yes      Resolved Hospital Problems   No resolved problems to display.          Hospital Course  Patient is a 74 y.o. female presented with with extensive tobacco use history with COPD who presented with abdominal pain and findings of a juxtarenal abdominal aortic aneurysm.  She has had previous bilateral renal artery stenting and was in advanced chronic renal disease state preoperatively.  She had undergone complex endovascular repair requiring placement of juxtarenal renal stenting and left renal artery fenestration.  At the time of surgery she had no flow within the right renal artery.  Postoperatively she had findings of hypotension and a CT scan demonstrating complication of left renal artery hemorrhage.  She was taken back to the operating room where the renal hemorrhage was successfully controlled with coil embolization of the inferior renal artery branch and covered stent placement of the proximal renal artery for dissection versus thrombosis.      Her postoperative course was complicated with acute on chronic renal failure requiring CRRT.  She also developed worsening acute respiratory failure.  Conservative measures were continued and due to patient's lack of progression and improvement, patient's family had agreed to proceed with palliative care.  Decision was made to withdraw care and on 6/15/2024 at 1838 she was  pronounced dead.      Procedures Performed    Procedure(s):  ENDOVASCULAR REPAIR OF AORTIC ANEURYSM with fenestration of the left renal artery, Large-bore bilateral femoral sheath placement for endovascular graft deployment.  Right 22 French and left 12 French, Bifurcated endovascular repair of aortic aneurysm extending into the right external iliac artery and left common iliac artery  -------------------    Procedure(s):  renal distal inferior artery embolization for hemorrhage left, Graftmaster covered stent placement for distal renal artery thrombus versus dissection  -------------------       Consults:   Consults       Date and Time Order Name Status Description    2024  8:05 AM Inpatient Palliative Care MD Consult Completed     2024  1:29 AM Inpatient Nephrology Consult Completed                 Condition on Discharge: Patient  on 6/15/2024 at 1838    Vital Signs       Physical Exam:   No exam performed today,    Discharge Disposition      Discharge Medications     Discharge Medications        ASK your doctor about these medications        Instructions Start Date   aspirin 325 MG EC tablet   325 mg, Oral, Daily      bisoprolol 5 MG tablet  Commonly known as: ZEBeta   2.5 mg, Oral, Daily      diazePAM 5 MG tablet  Commonly known as: VALIUM   5 mg, Oral, Daily PRN      levothyroxine 100 MCG tablet  Commonly known as: SYNTHROID, LEVOTHROID   100 mcg, Oral, Daily      MiraLax 17 GM/SCOOP powder  Generic drug: polyethylene glycol   17 g, Oral, Daily      NIFEdipine CC 90 MG 24 hr tablet  Commonly known as: ADALAT CC   90 mg, Oral, Daily      nitroglycerin 0.4 MG SL tablet  Commonly known as: NITROSTAT   1 under the tongue as needed for angina, may repeat q5mins for up three doses      omeprazole 40 MG capsule  Commonly known as: priLOSEC   40 mg, Oral, Daily      rosuvastatin 40 MG tablet  Commonly known as: CRESTOR   40 mg, Oral, Nightly      sertraline 100 MG tablet  Commonly known as:  ZOLOFT   200 mg, Oral, Daily      traZODone 100 MG tablet  Commonly known as: DESYREL   200 mg, Oral, Nightly      Vitamin D (Ergocalciferol) 81644 units capsule   1 capsule, Oral, Weekly               Discharge Diet:     Activity at Discharge:     Follow-up Appointments  No future appointments.      Test Results Pending at Discharge       Theodore Reyes MD  06/19/24  17:05 EDT    Time:  None

## (undated) DEVICE — PINNACLE INTRODUCER SHEATH: Brand: PINNACLE

## (undated) DEVICE — TOTAL TRAY, 16FR 10ML SIL FOLEY, URN: Brand: MEDLINE

## (undated) DEVICE — FOGARTY - HYDRAGRIP SURGICAL - CLAMP INSERTS: Brand: FOGARTY SOFTJAW

## (undated) DEVICE — CATH DIAG EXPO M/ PK 6FR FL4/FR4 PIG 3PK

## (undated) DEVICE — RADIFOCUS GUIDE WIRE M SMALL VESSEL GOLD: Brand: GUIDE WIRE M

## (undated) DEVICE — DRSNG WND BORDR/ADHS NONADHR/GZ LF 4X14IN STRL

## (undated) DEVICE — PROGREAT MICROCATHETER COAXIAL SYSTEM: Brand: PROGREAT

## (undated) DEVICE — DORADO® PTA BALLOON DILATATION CATHETER 6 MM X 40 MM, 80 CM CATHETER: Brand: DORADO®

## (undated) DEVICE — INCISIONLINE PLEDGET SFT 22X1 2.75MM

## (undated) DEVICE — ANTIBACTERIAL UNDYED BRAIDED (POLYGLACTIN 910), SYNTHETIC ABSORBABLE SUTURE: Brand: COATED VICRYL

## (undated) DEVICE — UNDERGLV SURG BIOGEL INDICAT PI SZ8 BLU

## (undated) DEVICE — SUT PROLN 3/0 8842H

## (undated) DEVICE — SUT PDS 1 TP1 48IN Z880G BX/12

## (undated) DEVICE — INTENDED USE FOR SURGICAL MARKING ON INTACT SKIN, ALSO PROVIDES A PERMANENT METHOD OF IDENTIFYING OBJECTS IN THE OPERATING ROOM: Brand: WRITESITE® REGULAR TIP SKIN MARKER

## (undated) DEVICE — Device

## (undated) DEVICE — BALN OCCL MOLDING .035 10TO37MM 4X90CM

## (undated) DEVICE — PK VASC 10

## (undated) DEVICE — INTRO SHEATH DRYSEAL FLEX 22F 7.3TO7.3MM 33CM

## (undated) DEVICE — RADIFOCUS GLIDEWIRE ADVANTAGE TRACK GUIDE: Brand: GLIDEWIRE ADVANTAGE TRACK

## (undated) DEVICE — INTRO SHEATH DRYSEAL FLEX 12F4TO4.7MM 33CM

## (undated) DEVICE — BALN EVERCROSS OTW .035 5F 5X20135

## (undated) DEVICE — 3M™ IOBAN™ 2 ANTIMICROBIAL INCISE DRAPE 6651EZ: Brand: IOBAN™ 2

## (undated) DEVICE — RADIFOCUS GLIDEWIRE ADVANTAGE TRACK GUIDE WIRE: Brand: GLIDEWIRE ADVANTAGE TRACK

## (undated) DEVICE — Device: Brand: TURBO-ELITE LASER ATHERECTOMY CATHETER

## (undated) DEVICE — HI-TORQUE PILOT 50 GUIDE WIRE .014 J TIP 3.0 CM X 190 CM: Brand: HI-TORQUE PILOT

## (undated) DEVICE — ANGIO-SEAL VIP VASCULAR CLOSURE DEVICE: Brand: ANGIO-SEAL

## (undated) DEVICE — RADIFOCUS GLIDECATH: Brand: GLIDECATH

## (undated) DEVICE — HYPODERMIC SAFETY NEEDLE: Brand: MONOJECT

## (undated) DEVICE — DECANTER BAG 9": Brand: MEDLINE INDUSTRIES, INC.

## (undated) DEVICE — GLV SURG BIOGEL LTX PF 7 1/2

## (undated) DEVICE — INFLATION DEVICE: Brand: ENCORE™ 26

## (undated) DEVICE — INFLATION DEVICE: Brand: ENCORE 26

## (undated) DEVICE — MEDI-VAC YANKAUER SUCTION HANDLE W/BULBOUS TIP: Brand: CARDINAL HEALTH

## (undated) DEVICE — SUT PROLN 3/0 V7 D/A 36IN 8976H

## (undated) DEVICE — SNAP KOVER: Brand: UNBRANDED

## (undated) DEVICE — SUCTION CANISTER 2500CC: Brand: DEROYAL

## (undated) DEVICE — SYRINGE,CONTROL,LL,FINGER,GRIP: Brand: MEDLINE INDUSTRIES, INC.

## (undated) DEVICE — CATH SZ ACCUVU SEG/20CM OMNI .038IN 5F 70CM

## (undated) DEVICE — SUT SILK 3/0 TIES 18IN A184H

## (undated) DEVICE — PINNACLE R/O II INTRODUCER SHEATH WITH RADIOPAQUE MARKER: Brand: PINNACLE

## (undated) DEVICE — GUIDE CATHETER: Brand: MACH1™

## (undated) DEVICE — SUT PROLN 4/0 V7 36IN 8975H

## (undated) DEVICE — RADIFOCUS GLIDEWIRE ADVANTAGE GUIDEWIRE: Brand: GLIDEWIRE ADVANTAGE

## (undated) DEVICE — DETACH COIL INSNT

## (undated) DEVICE — ELECTRD BLD EZ CLN MOD 4IN

## (undated) DEVICE — SHEATH STEER INTRO TOURGUIDE 6.5F 55CM 17MM

## (undated) DEVICE — SPNG GZ WOVN 4X4IN 12PLY 10/BX STRL

## (undated) DEVICE — CVR HNDL LIGHT RIGID

## (undated) DEVICE — ULTRAVERSE® 018 PTA BALLOON DILATATION CATHETER 2 MM X 40 MM, 150 CM CATHETER: Brand: ULTRAVERSE® 018

## (undated) DEVICE — SAFESECURE,SECUREMENT,FOLEY CATH,STERILE: Brand: MEDLINE

## (undated) DEVICE — ULTRAVERSE® 018 PTA BALLOON DILATATION CATHETER 4 MM X 40 MM, 150 CM CATHETER: Brand: ULTRAVERSE® 018

## (undated) DEVICE — CVR PROB ULTRASND/TRANSD W/GEL 18X120CM STRL

## (undated) DEVICE — PERCLOSE™ PROSTYLE™ SUTURE-MEDIATED CLOSURE AND REPAIR SYSTEM: Brand: PERCLOSE™ PROSTYLE™

## (undated) DEVICE — IMPLANTABLE DEVICE: Type: IMPLANTABLE DEVICE | Site: ARTERIAL | Status: NON-FUNCTIONAL

## (undated) DEVICE — ULTRAVERSE® 014 PTA BALLOON DILATATION CATHETER 4 MM X 40 MM, 150 CM CATHETER: Brand: ULTRAVERSE® 014

## (undated) DEVICE — SUT SILK 2/0 SH CR8 18IN CR8 C012D

## (undated) DEVICE — CLTH CLENS READYCLEANSE PERI CARE PK/5

## (undated) DEVICE — KT INTRO MINISTICK MAX W/GW NITNL/TUNG ECHO STFF 4F 21G 7CM